# Patient Record
Sex: MALE | Race: WHITE | Employment: OTHER | URBAN - METROPOLITAN AREA
[De-identification: names, ages, dates, MRNs, and addresses within clinical notes are randomized per-mention and may not be internally consistent; named-entity substitution may affect disease eponyms.]

---

## 2017-04-21 ENCOUNTER — HOSPITAL ENCOUNTER (OUTPATIENT)
Dept: CT IMAGING | Age: 57
Discharge: HOME | End: 2017-04-21
Attending: INTERNAL MEDICINE
Payer: MEDICARE

## 2017-04-21 DIAGNOSIS — K74.69 OTHER CIRRHOSIS OF LIVER (HCC): ICD-10-CM

## 2017-04-21 PROCEDURE — 74011000258 HC RX REV CODE- 258: Performed by: INTERNAL MEDICINE

## 2017-04-21 PROCEDURE — 74160 CT ABDOMEN W/CONTRAST: CPT

## 2017-04-21 PROCEDURE — 74011636320 HC RX REV CODE- 636/320: Performed by: INTERNAL MEDICINE

## 2017-04-21 RX ORDER — SODIUM CHLORIDE 0.9 % (FLUSH) 0.9 %
10 SYRINGE (ML) INJECTION
Status: COMPLETED | OUTPATIENT
Start: 2017-04-21 | End: 2017-04-21

## 2017-04-21 RX ADMIN — IOPAMIDOL 100 ML: 755 INJECTION, SOLUTION INTRAVENOUS at 15:47

## 2017-04-21 RX ADMIN — SODIUM CHLORIDE 100 ML: 900 INJECTION, SOLUTION INTRAVENOUS at 15:47

## 2017-04-21 RX ADMIN — DIATRIZOATE MEGLUMINE AND DIATRIZOATE SODIUM 15 ML: 660; 100 LIQUID ORAL; RECTAL at 15:47

## 2017-04-21 RX ADMIN — Medication 10 ML: at 15:47

## 2017-05-08 ENCOUNTER — HOSPITAL ENCOUNTER (OUTPATIENT)
Dept: CT IMAGING | Age: 57
Discharge: HOME | End: 2017-05-08
Attending: INTERNAL MEDICINE
Payer: MEDICARE

## 2017-05-08 DIAGNOSIS — R91.1 SOLITARY PULMONARY NODULE: ICD-10-CM

## 2017-05-08 PROCEDURE — 71250 CT THORAX DX C-: CPT

## 2017-06-02 ENCOUNTER — HOSPITAL ENCOUNTER (OUTPATIENT)
Dept: LAB | Age: 57
Discharge: HOME | End: 2017-06-02
Payer: MEDICARE

## 2017-06-02 LAB
ALBUMIN SERPL BCP-MCNC: 2.6 G/DL (ref 3.5–5)
ALBUMIN/GLOB SERPL: 0.5 {RATIO} (ref 1.2–3.5)
ALP SERPL-CCNC: 152 U/L (ref 50–136)
ALT SERPL-CCNC: 21 U/L (ref 12–65)
ANION GAP BLD CALC-SCNC: 8 MMOL/L (ref 7–16)
AST SERPL W P-5'-P-CCNC: 19 U/L (ref 15–37)
BASOPHILS # BLD AUTO: 0.1 K/UL (ref 0–0.2)
BASOPHILS # BLD: 1 % (ref 0–2)
BILIRUB SERPL-MCNC: 0.4 MG/DL (ref 0.2–1.1)
BUN SERPL-MCNC: 7 MG/DL (ref 6–23)
CALCIUM SERPL-MCNC: 9.5 MG/DL (ref 8.3–10.4)
CHLORIDE SERPL-SCNC: 100 MMOL/L (ref 98–107)
CO2 SERPL-SCNC: 31 MMOL/L (ref 21–32)
CREAT SERPL-MCNC: 1.01 MG/DL (ref 0.8–1.5)
DIFFERENTIAL METHOD BLD: ABNORMAL
EOSINOPHIL # BLD: 0.1 K/UL (ref 0–0.8)
EOSINOPHIL NFR BLD: 1 % (ref 0.5–7.8)
ERYTHROCYTE [DISTWIDTH] IN BLOOD BY AUTOMATED COUNT: 15.4 % (ref 11.9–14.6)
GLOBULIN SER CALC-MCNC: 5 G/DL (ref 2.3–3.5)
GLUCOSE SERPL-MCNC: 83 MG/DL (ref 65–100)
HCT VFR BLD AUTO: 34.1 % (ref 41.1–50.3)
HGB BLD-MCNC: 11 G/DL (ref 13.6–17.2)
IMM GRANULOCYTES # BLD: 0 K/UL (ref 0–0.5)
IMM GRANULOCYTES NFR BLD AUTO: 0.3 % (ref 0–5)
LYMPHOCYTES # BLD AUTO: 26 % (ref 13–44)
LYMPHOCYTES # BLD: 3.1 K/UL (ref 0.5–4.6)
MCH RBC QN AUTO: 26.2 PG (ref 26.1–32.9)
MCHC RBC AUTO-ENTMCNC: 32.3 G/DL (ref 31.4–35)
MCV RBC AUTO: 81.2 FL (ref 79.6–97.8)
MONOCYTES # BLD: 0.8 K/UL (ref 0.1–1.3)
MONOCYTES NFR BLD AUTO: 7 % (ref 4–12)
NEUTS SEG # BLD: 7.7 K/UL (ref 1.7–8.2)
NEUTS SEG NFR BLD AUTO: 65 % (ref 43–78)
PLATELET # BLD AUTO: 482 K/UL (ref 150–450)
PMV BLD AUTO: 8.5 FL (ref 10.8–14.1)
POTASSIUM SERPL-SCNC: 4.5 MMOL/L (ref 3.5–5.1)
PROT SERPL-MCNC: 7.6 G/DL (ref 6.3–8.2)
RBC # BLD AUTO: 4.2 M/UL (ref 4.23–5.67)
SODIUM SERPL-SCNC: 139 MMOL/L (ref 136–145)
WBC # BLD AUTO: 11.7 K/UL (ref 4.3–11.1)

## 2017-06-02 PROCEDURE — 85025 COMPLETE CBC W/AUTO DIFF WBC: CPT | Performed by: NURSE PRACTITIONER

## 2017-06-02 PROCEDURE — 36415 COLL VENOUS BLD VENIPUNCTURE: CPT | Performed by: NURSE PRACTITIONER

## 2017-06-02 PROCEDURE — 80053 COMPREHEN METABOLIC PANEL: CPT | Performed by: NURSE PRACTITIONER

## 2017-06-09 ENCOUNTER — HOSPITAL ENCOUNTER (OUTPATIENT)
Dept: LAB | Age: 57
Discharge: HOME | End: 2017-06-09
Payer: MEDICARE

## 2017-06-09 DIAGNOSIS — D72.829 LEUKOCYTOSIS, UNSPECIFIED TYPE: ICD-10-CM

## 2017-06-09 DIAGNOSIS — K74.60 HEPATIC CIRRHOSIS, UNSPECIFIED HEPATIC CIRRHOSIS TYPE (HCC): ICD-10-CM

## 2017-06-09 LAB
ALBUMIN SERPL BCP-MCNC: 2.3 G/DL (ref 3.5–5)
ALBUMIN/GLOB SERPL: 0.5 {RATIO} (ref 1.2–3.5)
ALP SERPL-CCNC: 246 U/L (ref 50–136)
ALT SERPL-CCNC: 29 U/L (ref 12–65)
AST SERPL W P-5'-P-CCNC: 32 U/L (ref 15–37)
BILIRUB DIRECT SERPL-MCNC: 0.2 MG/DL
BILIRUB SERPL-MCNC: 0.5 MG/DL (ref 0.2–1.1)
GLOBULIN SER CALC-MCNC: 5 G/DL (ref 2.3–3.5)
PROT SERPL-MCNC: 7.3 G/DL (ref 6.3–8.2)

## 2017-06-09 PROCEDURE — 80076 HEPATIC FUNCTION PANEL: CPT | Performed by: INTERNAL MEDICINE

## 2017-06-09 PROCEDURE — 36415 COLL VENOUS BLD VENIPUNCTURE: CPT | Performed by: INTERNAL MEDICINE

## 2017-06-09 PROCEDURE — 87040 BLOOD CULTURE FOR BACTERIA: CPT | Performed by: INTERNAL MEDICINE

## 2017-06-14 LAB
BACTERIA SPEC CULT: NORMAL
BACTERIA SPEC CULT: NORMAL
SERVICE CMNT-IMP: NORMAL
SERVICE CMNT-IMP: NORMAL

## 2017-06-15 ENCOUNTER — HOSPITAL ENCOUNTER (INPATIENT)
Age: 57
LOS: 14 days | Discharge: HOME HEALTH | DRG: 987 | End: 2017-06-29
Attending: INTERNAL MEDICINE | Admitting: INTERNAL MEDICINE
Payer: MEDICARE

## 2017-06-15 ENCOUNTER — APPOINTMENT (OUTPATIENT)
Dept: CT IMAGING | Age: 57
DRG: 987 | End: 2017-06-15
Attending: PHYSICIAN ASSISTANT
Payer: MEDICARE

## 2017-06-15 ENCOUNTER — APPOINTMENT (OUTPATIENT)
Dept: ULTRASOUND IMAGING | Age: 57
DRG: 987 | End: 2017-06-15
Attending: PHYSICIAN ASSISTANT
Payer: MEDICARE

## 2017-06-15 DIAGNOSIS — R09.02 HYPOXIA: ICD-10-CM

## 2017-06-15 DIAGNOSIS — K22.2 SCHATZKI'S RING: ICD-10-CM

## 2017-06-15 DIAGNOSIS — Z86.19 HISTORY OF HEPATITIS C: Chronic | ICD-10-CM

## 2017-06-15 DIAGNOSIS — F41.9 ANXIETY AND DEPRESSION: Chronic | ICD-10-CM

## 2017-06-15 DIAGNOSIS — F32.A ANXIETY AND DEPRESSION: Chronic | ICD-10-CM

## 2017-06-15 DIAGNOSIS — J98.4 PULMONARY CAVITARY LESION: ICD-10-CM

## 2017-06-15 DIAGNOSIS — M31.0 GOODPASTURE SYNDROME (HCC): ICD-10-CM

## 2017-06-15 DIAGNOSIS — R79.82 ELEVATED C-REACTIVE PROTEIN (CRP): ICD-10-CM

## 2017-06-15 DIAGNOSIS — R93.89 ABNORMAL CXR: ICD-10-CM

## 2017-06-15 DIAGNOSIS — E43 SEVERE PROTEIN-CALORIE MALNUTRITION (HCC): Chronic | ICD-10-CM

## 2017-06-15 DIAGNOSIS — R00.1 BRADYCARDIA: ICD-10-CM

## 2017-06-15 LAB
ALBUMIN SERPL BCP-MCNC: 2.3 G/DL (ref 3.5–5)
ALBUMIN/GLOB SERPL: 0.5 {RATIO} (ref 1.2–3.5)
ALP SERPL-CCNC: 172 U/L (ref 50–136)
ALT SERPL-CCNC: 22 U/L (ref 12–65)
ANION GAP BLD CALC-SCNC: 9 MMOL/L (ref 7–16)
APPEARANCE UR: NORMAL
AST SERPL W P-5'-P-CCNC: 22 U/L (ref 15–37)
ATRIAL RATE: 89 BPM
BASOPHILS # BLD AUTO: 0.1 K/UL (ref 0–0.2)
BASOPHILS # BLD: 1 % (ref 0–2)
BILIRUB SERPL-MCNC: 0.2 MG/DL (ref 0.2–1.1)
BILIRUB UR QL: NEGATIVE
BUN SERPL-MCNC: 6 MG/DL (ref 6–23)
CALCIUM SERPL-MCNC: 9.3 MG/DL (ref 8.3–10.4)
CALCULATED P AXIS, ECG09: 55 DEGREES
CALCULATED R AXIS, ECG10: 42 DEGREES
CALCULATED T AXIS, ECG11: 61 DEGREES
CHLORIDE SERPL-SCNC: 104 MMOL/L (ref 98–107)
CO2 SERPL-SCNC: 30 MMOL/L (ref 21–32)
COLOR UR: YELLOW
CREAT SERPL-MCNC: 1 MG/DL (ref 0.8–1.5)
DIAGNOSIS, 93000: NORMAL
DIFFERENTIAL METHOD BLD: ABNORMAL
EOSINOPHIL # BLD: 0.2 K/UL (ref 0–0.8)
EOSINOPHIL NFR BLD: 3 % (ref 0.5–7.8)
ERYTHROCYTE [DISTWIDTH] IN BLOOD BY AUTOMATED COUNT: 16.4 % (ref 11.9–14.6)
GLOBULIN SER CALC-MCNC: 4.6 G/DL (ref 2.3–3.5)
GLUCOSE SERPL-MCNC: 93 MG/DL (ref 65–100)
GLUCOSE UR STRIP.AUTO-MCNC: NEGATIVE MG/DL
HCT VFR BLD AUTO: 33.8 % (ref 41.1–50.3)
HGB BLD-MCNC: 10.9 G/DL (ref 13.6–17.2)
HGB UR QL STRIP: NEGATIVE
IMM GRANULOCYTES # BLD: 0.1 K/UL (ref 0–0.5)
IMM GRANULOCYTES NFR BLD AUTO: 0.8 % (ref 0–5)
KETONES UR QL STRIP.AUTO: NEGATIVE MG/DL
LEUKOCYTE ESTERASE UR QL STRIP.AUTO: NEGATIVE
LYMPHOCYTES # BLD AUTO: 28 % (ref 13–44)
LYMPHOCYTES # BLD: 2.2 K/UL (ref 0.5–4.6)
MCH RBC QN AUTO: 25.8 PG (ref 26.1–32.9)
MCHC RBC AUTO-ENTMCNC: 32.2 G/DL (ref 31.4–35)
MCV RBC AUTO: 79.9 FL (ref 79.6–97.8)
MONOCYTES # BLD: 0.3 K/UL (ref 0.1–1.3)
MONOCYTES NFR BLD AUTO: 4 % (ref 4–12)
NEUTS SEG # BLD: 5.1 K/UL (ref 1.7–8.2)
NEUTS SEG NFR BLD AUTO: 63 % (ref 43–78)
NITRITE UR QL STRIP.AUTO: NEGATIVE
P-R INTERVAL, ECG05: 140 MS
PH UR STRIP: 7.5 [PH] (ref 5–9)
PLATELET # BLD AUTO: 463 K/UL (ref 150–450)
PMV BLD AUTO: 8.6 FL (ref 10.8–14.1)
POTASSIUM SERPL-SCNC: 3.9 MMOL/L (ref 3.5–5.1)
PROCALCITONIN SERPL-MCNC: <0.1 NG/ML
PROT SERPL-MCNC: 6.9 G/DL (ref 6.3–8.2)
PROT UR STRIP-MCNC: NEGATIVE MG/DL
Q-T INTERVAL, ECG07: 364 MS
QRS DURATION, ECG06: 98 MS
QTC CALCULATION (BEZET), ECG08: 442 MS
RBC # BLD AUTO: 4.23 M/UL (ref 4.23–5.67)
SODIUM SERPL-SCNC: 143 MMOL/L (ref 136–145)
SP GR UR REFRACTOMETRY: 1.01 (ref 1–1.02)
UROBILINOGEN UR QL STRIP.AUTO: 1 EU/DL (ref 0.2–1)
VENTRICULAR RATE, ECG03: 89 BPM
WBC # BLD AUTO: 8 K/UL (ref 4.3–11.1)

## 2017-06-15 PROCEDURE — 85025 COMPLETE CBC W/AUTO DIFF WBC: CPT | Performed by: PHYSICIAN ASSISTANT

## 2017-06-15 PROCEDURE — 74011250637 HC RX REV CODE- 250/637: Performed by: PHYSICIAN ASSISTANT

## 2017-06-15 PROCEDURE — 83516 IMMUNOASSAY NONANTIBODY: CPT | Performed by: INTERNAL MEDICINE

## 2017-06-15 PROCEDURE — 99223 1ST HOSP IP/OBS HIGH 75: CPT | Performed by: INTERNAL MEDICINE

## 2017-06-15 PROCEDURE — 36415 COLL VENOUS BLD VENIPUNCTURE: CPT | Performed by: PHYSICIAN ASSISTANT

## 2017-06-15 PROCEDURE — 84145 PROCALCITONIN (PCT): CPT | Performed by: PHYSICIAN ASSISTANT

## 2017-06-15 PROCEDURE — 81003 URINALYSIS AUTO W/O SCOPE: CPT | Performed by: PHYSICIAN ASSISTANT

## 2017-06-15 PROCEDURE — 74011000258 HC RX REV CODE- 258: Performed by: PHYSICIAN ASSISTANT

## 2017-06-15 PROCEDURE — 74011250636 HC RX REV CODE- 250/636: Performed by: INTERNAL MEDICINE

## 2017-06-15 PROCEDURE — 93005 ELECTROCARDIOGRAM TRACING: CPT | Performed by: PHYSICIAN ASSISTANT

## 2017-06-15 PROCEDURE — 71260 CT THORAX DX C+: CPT

## 2017-06-15 PROCEDURE — 74011636320 HC RX REV CODE- 636/320: Performed by: INTERNAL MEDICINE

## 2017-06-15 PROCEDURE — 93971 EXTREMITY STUDY: CPT

## 2017-06-15 PROCEDURE — 87040 BLOOD CULTURE FOR BACTERIA: CPT | Performed by: INTERNAL MEDICINE

## 2017-06-15 PROCEDURE — 74011250636 HC RX REV CODE- 250/636: Performed by: PHYSICIAN ASSISTANT

## 2017-06-15 PROCEDURE — 80053 COMPREHEN METABOLIC PANEL: CPT | Performed by: PHYSICIAN ASSISTANT

## 2017-06-15 PROCEDURE — 65270000029 HC RM PRIVATE

## 2017-06-15 PROCEDURE — 83520 IMMUNOASSAY QUANT NOS NONAB: CPT | Performed by: PHYSICIAN ASSISTANT

## 2017-06-15 PROCEDURE — 74011000258 HC RX REV CODE- 258: Performed by: INTERNAL MEDICINE

## 2017-06-15 RX ORDER — SODIUM CHLORIDE 0.9 % (FLUSH) 0.9 %
5-10 SYRINGE (ML) INJECTION EVERY 8 HOURS
Status: DISCONTINUED | OUTPATIENT
Start: 2017-06-15 | End: 2017-06-29 | Stop reason: HOSPADM

## 2017-06-15 RX ORDER — MIRTAZAPINE 15 MG/1
30 TABLET, FILM COATED ORAL
Status: DISCONTINUED | OUTPATIENT
Start: 2017-06-15 | End: 2017-06-29 | Stop reason: HOSPADM

## 2017-06-15 RX ORDER — ALBUTEROL SULFATE 0.83 MG/ML
2.5 SOLUTION RESPIRATORY (INHALATION)
Status: DISCONTINUED | OUTPATIENT
Start: 2017-06-15 | End: 2017-06-29 | Stop reason: HOSPADM

## 2017-06-15 RX ORDER — DESVENLAFAXINE 100 MG/1
100 TABLET, EXTENDED RELEASE ORAL 2 TIMES DAILY
Status: DISCONTINUED | OUTPATIENT
Start: 2017-06-15 | End: 2017-06-29 | Stop reason: HOSPADM

## 2017-06-15 RX ORDER — PANTOPRAZOLE SODIUM 40 MG/1
40 TABLET, DELAYED RELEASE ORAL
Status: DISCONTINUED | OUTPATIENT
Start: 2017-06-16 | End: 2017-06-29 | Stop reason: HOSPADM

## 2017-06-15 RX ORDER — GABAPENTIN 400 MG/1
1200 CAPSULE ORAL 3 TIMES DAILY
Status: DISCONTINUED | OUTPATIENT
Start: 2017-06-15 | End: 2017-06-29 | Stop reason: HOSPADM

## 2017-06-15 RX ORDER — AMOXICILLIN 250 MG
1 CAPSULE ORAL
Status: DISCONTINUED | OUTPATIENT
Start: 2017-06-15 | End: 2017-06-29 | Stop reason: HOSPADM

## 2017-06-15 RX ORDER — MORPHINE SULFATE 2 MG/ML
1 INJECTION, SOLUTION INTRAMUSCULAR; INTRAVENOUS
Status: DISCONTINUED | OUTPATIENT
Start: 2017-06-15 | End: 2017-06-19

## 2017-06-15 RX ORDER — LAMOTRIGINE 100 MG/1
100 TABLET ORAL DAILY
Status: DISCONTINUED | OUTPATIENT
Start: 2017-06-16 | End: 2017-06-29 | Stop reason: HOSPADM

## 2017-06-15 RX ORDER — SODIUM CHLORIDE 0.9 % (FLUSH) 0.9 %
10 SYRINGE (ML) INJECTION
Status: COMPLETED | OUTPATIENT
Start: 2017-06-15 | End: 2017-06-15

## 2017-06-15 RX ORDER — NALOXONE HYDROCHLORIDE 0.4 MG/ML
0.4 INJECTION, SOLUTION INTRAMUSCULAR; INTRAVENOUS; SUBCUTANEOUS AS NEEDED
Status: DISCONTINUED | OUTPATIENT
Start: 2017-06-15 | End: 2017-06-29 | Stop reason: HOSPADM

## 2017-06-15 RX ORDER — DIAZEPAM 5 MG/1
10 TABLET ORAL
Status: DISCONTINUED | OUTPATIENT
Start: 2017-06-15 | End: 2017-06-29 | Stop reason: HOSPADM

## 2017-06-15 RX ORDER — SODIUM CHLORIDE 9 MG/ML
100 INJECTION, SOLUTION INTRAVENOUS CONTINUOUS
Status: DISCONTINUED | OUTPATIENT
Start: 2017-06-15 | End: 2017-06-22

## 2017-06-15 RX ORDER — DOXAZOSIN 4 MG/1
4 TABLET ORAL DAILY
Status: DISCONTINUED | OUTPATIENT
Start: 2017-06-16 | End: 2017-06-29 | Stop reason: HOSPADM

## 2017-06-15 RX ORDER — SODIUM CHLORIDE 0.9 % (FLUSH) 0.9 %
5-10 SYRINGE (ML) INJECTION AS NEEDED
Status: DISCONTINUED | OUTPATIENT
Start: 2017-06-15 | End: 2017-06-29 | Stop reason: HOSPADM

## 2017-06-15 RX ORDER — ACETAMINOPHEN 325 MG/1
650 TABLET ORAL
Status: DISCONTINUED | OUTPATIENT
Start: 2017-06-15 | End: 2017-06-29 | Stop reason: HOSPADM

## 2017-06-15 RX ORDER — BACLOFEN 10 MG/1
20 TABLET ORAL 3 TIMES DAILY
Status: DISCONTINUED | OUTPATIENT
Start: 2017-06-15 | End: 2017-06-29 | Stop reason: HOSPADM

## 2017-06-15 RX ORDER — ONDANSETRON 2 MG/ML
4 INJECTION INTRAMUSCULAR; INTRAVENOUS
Status: DISCONTINUED | OUTPATIENT
Start: 2017-06-15 | End: 2017-06-29 | Stop reason: HOSPADM

## 2017-06-15 RX ORDER — OXYCODONE HYDROCHLORIDE 15 MG/1
30 TABLET ORAL
Status: DISCONTINUED | OUTPATIENT
Start: 2017-06-15 | End: 2017-06-29 | Stop reason: HOSPADM

## 2017-06-15 RX ORDER — LORAZEPAM 2 MG/ML
1 INJECTION INTRAMUSCULAR
Status: DISCONTINUED | OUTPATIENT
Start: 2017-06-15 | End: 2017-06-19

## 2017-06-15 RX ORDER — DEXTROAMPHETAMINE SACCHARATE, AMPHETAMINE ASPARTATE, DEXTROAMPHETAMINE SULFATE AND AMPHETAMINE SULFATE 3.75; 3.75; 3.75; 3.75 MG/1; MG/1; MG/1; MG/1
30 TABLET ORAL 3 TIMES DAILY
Status: DISCONTINUED | OUTPATIENT
Start: 2017-06-15 | End: 2017-06-29 | Stop reason: HOSPADM

## 2017-06-15 RX ORDER — ENOXAPARIN SODIUM 100 MG/ML
40 INJECTION SUBCUTANEOUS EVERY 24 HOURS
Status: DISCONTINUED | OUTPATIENT
Start: 2017-06-15 | End: 2017-06-19

## 2017-06-15 RX ADMIN — DIAZEPAM 10 MG: 5 TABLET ORAL at 15:03

## 2017-06-15 RX ADMIN — BACLOFEN 20 MG: 10 TABLET ORAL at 17:08

## 2017-06-15 RX ADMIN — Medication 5 ML: at 17:10

## 2017-06-15 RX ADMIN — CEFTRIAXONE 1 G: 1 INJECTION, POWDER, FOR SOLUTION INTRAMUSCULAR; INTRAVENOUS at 17:07

## 2017-06-15 RX ADMIN — DEXTROAMPHETAMINE SACCHARATE, AMPHETAMINE ASPARTATE, DEXTROAMPHETAMINE SULFATE AND AMPHETAMINE SULFATE 30 MG: 3.75; 3.75; 3.75; 3.75 TABLET ORAL at 21:08

## 2017-06-15 RX ADMIN — Medication 10 ML: at 16:22

## 2017-06-15 RX ADMIN — MIRTAZAPINE 30 MG: 15 TABLET, FILM COATED ORAL at 21:08

## 2017-06-15 RX ADMIN — OXYCODONE HYDROCHLORIDE 30 MG: 15 TABLET ORAL at 21:08

## 2017-06-15 RX ADMIN — IOPAMIDOL 100 ML: 755 INJECTION, SOLUTION INTRAVENOUS at 16:22

## 2017-06-15 RX ADMIN — Medication 10 ML: at 21:13

## 2017-06-15 RX ADMIN — DESVENLAFAXINE 100 MG: 100 TABLET, EXTENDED RELEASE ORAL at 21:10

## 2017-06-15 RX ADMIN — OXYCODONE HYDROCHLORIDE 30 MG: 15 TABLET ORAL at 14:59

## 2017-06-15 RX ADMIN — SODIUM CHLORIDE 100 ML/HR: 900 INJECTION, SOLUTION INTRAVENOUS at 17:06

## 2017-06-15 RX ADMIN — BACLOFEN 20 MG: 10 TABLET ORAL at 21:08

## 2017-06-15 RX ADMIN — ENOXAPARIN SODIUM 40 MG: 40 INJECTION SUBCUTANEOUS at 17:09

## 2017-06-15 RX ADMIN — SODIUM CHLORIDE 1000 ML: 900 INJECTION, SOLUTION INTRAVENOUS at 19:30

## 2017-06-15 RX ADMIN — DEXTROAMPHETAMINE SACCHARATE, AMPHETAMINE ASPARTATE, DEXTROAMPHETAMINE SULFATE AND AMPHETAMINE SULFATE 30 MG: 3.75; 3.75; 3.75; 3.75 TABLET ORAL at 17:08

## 2017-06-15 RX ADMIN — SODIUM CHLORIDE 100 ML: 900 INJECTION, SOLUTION INTRAVENOUS at 16:22

## 2017-06-15 RX ADMIN — AZITHROMYCIN MONOHYDRATE 500 MG: 500 INJECTION, POWDER, LYOPHILIZED, FOR SOLUTION INTRAVENOUS at 17:06

## 2017-06-15 RX ADMIN — GABAPENTIN 1200 MG: 400 CAPSULE ORAL at 17:09

## 2017-06-15 NOTE — PROGRESS NOTES
Pt blood pressure low called Dr. Hannah Garner order for 1 liter bolus received. will continue to monitor pt.

## 2017-06-15 NOTE — PROGRESS NOTES
Patient arrived to room 803 via w/c via family. Patient is alert and orientated with no distress noted. Respirations even and unlabored with heart rate regular. Patient able to ambulate x1 assist r/t weakness and LLE pain (possible DVT). Duel skin assessment completed with Pat RN. Patient has several scars across whole body. Bilateral feet with thick flaky skin peeling. Left pinky toe red with the next toe with healing scab on top. Patient states peeling skin are blisters that have popped. Sister at bedside for support. Patient orientated to room and surroundings. Bed in low locked position with call light within reach. Patient instructed to call when assistance is needed. Will continue to monitor.   Waiting on orders from receiving MD.

## 2017-06-15 NOTE — PROGRESS NOTES
Patient stable with no complaints at this time. Patient relaxing in bed watching TV. Patient with sister at bedside. Sister to bring home med back this PM.  Call light within reach.   Report to be given to oncoming RN 7p-7a

## 2017-06-15 NOTE — IP AVS SNAPSHOT
[address] [facility] 
[phone] Patient: Marylou Cormier MRN: [id number] :1960 You are allergic to the following No active allergies Recent Documentation Height Weight BMI Smoking Status 1.702 m 71.8 kg 24.78 kg/m2 Former Smoker Unresulted Labs Order Current Status AFB CULTURE + SMEAR W/RFLX ID FROM CULTURE Preliminary result FUNGUS IDENTIFICATION REFLEX Preliminary result FUNGUS IDENTIFICATION REFLEX Preliminary result Emergency Contacts Name Discharge Info Relation Home Work Mobile Bety Mar DISCHARGE CAREGIVER [3] Sister [23] [phone] Mello Keen DISCHARGE CAREGIVER [3] Son [22] [phone] About your hospitalization You were admitted on:  Radha 15, 2017 You last received care in the:  [facility] You were discharged on:  2017 Unit phone number:  [phone] Why you were hospitalized Your primary diagnosis was:  Pulmonary Cavitary Lesion Your diagnoses also included:  Anxiety And Depression, Hld (Hyperlipidemia), History Of Hepatitis C, Chronic Shoulder Pain, Bph (Benign Prostatic Hyperplasia), Abnormal Cxr, Elevated C-Reactive Protein (Crp), Goodpasture Syndrome (Hcc), Bradycardia, Severe Protein-Calorie Malnutrition (Hcc), Schatzki's Ring, Edema, Hypoxia Providers Seen During Your Hospitalizations Provider Role Specialty Primary office phone Bere Taylor MD Attending Provider Pulmonary Disease [phone] Boni Scott MD Attending Provider Oncology [phone] Your Primary Care Physician (PCP) Primary Care Physician Office Phone Office Fax Afia Garcia [phone] Follow-up Information Follow up With Details Comments Contact Info  [facility] Cardiology  arrange 14 day tele monitor and follow up after monitor [address]  
 [address] [address] [facility] 
[phone] Ke Hameed MD   [address] [place] [address] 
[phone] Josselin Dowd MD On 6/30/2017 at 12:30 [address] [place] [address] 
[phone] Blake Galarza NP On 7/14/2017 9:15 a.m. [facility] office per [facility]. .. [address] [place] [address] 
[phone] Kacy Dunaway MD On 7/18/2017 pt needs to be at office at 3:10 p.m. per [facility] [address] [place] [facility] [place] [address] 
[phone] Your Appointments Friday June 30, 2017 12:30 PM EDT  
LAB with [address]  
18[address] [facility] OUTREACH INSURANCE [address]  
[phone] Friday June 30, 2017  1:00 PM EDT New Patient with Josselin Dowd MD  
[facility]) [address] [place] [address]  
[phone] Friday June 30, 2017  2:00 PM EDT Apheresis with FLR5 INF2 [facility] ([address] [place] 5th Floor Infusion [address] Mitchell Skiff [phone]  [place] [address]  
[phone] For [facility] SURGICAL [facility] Floor [phone] ext. 3201 [facility] Sunday July 02, 2017  2:00 PM EDT Apheresis with FLR5 INF3 [facility] ([address] [facility] [place] 5th Floor Infusion [address] Mitchell Skiff [phone]  [place] [address]  
[phone] For [facility] Floor [phone] ext. 3201 [facility] Tuesday July 18, 2017  3:40 PM EDT  
(Arrive by 3:10 PM) HOSPITAL with Kacy Dunaway MD  
[facility] SPECIALTY [facility]monary and Critical Care ([facility]) [address] [facility] [address] [facility]  
[phone] Current Discharge Medication List  
  
START taking these medications Dose & Instructions Dispensing Information Comments Morning Noon Evening Bedtime cyclophosphamide 50 mg capsule Commonly known as:  CYTOXAN Your last dose was:  Yesterday 06/28/17 at 21  Your next dose is: Take tonight at 10 pm 
  
   
 Dose:  150 mg Take 3 Caps by mouth every twenty-four (24) hours for 14 days. Quantity:  42 Cap Refills:  0  
     
   
   
   
  
  
 polyethylene glycol 17 gram packet Commonly known as:  Margarita Guevara Your next dose is:  Tomorrow Morning Dose:  17 g Take 1 Packet by mouth daily. Quantity:  30 Packet Refills:  1  
     
  
   
   
   
  
 senna-docusate 8.6-50 mg per tablet Commonly known as:  Lysle Gaudy Your next dose is: Take on as needed schedule Dose:  1 Tab Take 1 Tab by mouth two (2) times daily as needed for Constipation. Quantity:  60 Tab Refills:  1 CONTINUE these medications which have CHANGED Dose & Instructions Dispensing Information Comments Morning Noon Evening Bedtime  
 oxyCODONE IR 30 mg immediate release tablet Commonly known as:  OXY-IR Start taking on:  7/8/2017 What changed:  Another medication with the same name was removed. Continue taking this medication, and follow the directions you see here. Dose:  30 mg Take 1 Tab by mouth every four (4) hours as needed for Pain. Quantity:  180 Tab Refills:  0 CONTINUE these medications which have NOT CHANGED Dose & Instructions Dispensing Information Comments Morning Noon Evening Bedtime ADDERALL 30 mg tablet Generic drug:  dextroamphetamine-amphetamine Your next dose is:  Resume home schedule Dose:  30 mg Take 30 mg by mouth three (3) times daily. Refills:  0  
     
   
   
   
  
 baclofen 20 mg tablet Commonly known as:  LIORESAL Your next dose is:  Resume home schedule Dose:  20 mg Take 1 Tab by mouth three (3) times daily. Quantity:  270 Tab Refills:  3  
     
   
   
   
  
 doxazosin 4 mg tablet Commonly known as:  CARDURA Your next dose is:  Resume home schedule Dose:  4 mg Take 1 Tab by mouth daily. Indications: SYMPTOMATIC BENIGN PROSTATIC HYPERPLASIA Quantity:  30 Tab Refills:  6  
     
   
   
   
  
 gabapentin 600 mg tablet Commonly known as:  NEURONTIN Your next dose is:  Resume home schedule Dose:  1200 mg Take 2 Tabs by mouth three (3) times daily. Quantity:  180 Tab Refills:  11 To replace previous order for 400 mg capsules  
    
   
   
   
  
 lamoTRIgine 100 mg tablet Commonly known as: LaMICtal  
Your next dose is:  Resume home schedule TK 1 T PO Q DAY Refills:  2  
     
   
   
   
  
 multivitamin, stress formula tablet Commonly known as:  STRESS TAB Your next dose is:  Tomorrow Morning Dose:  1 Tab Take 1 Tab by mouth daily. Refills:  0  
     
   
   
   
  
 omeprazole 40 mg capsule Commonly known as:  PRILOSEC Your next dose is:  Tomorrow Morning Dose:  40 mg Take 1 Cap by mouth daily. Quantity:  30 Cap Refills:  11 PRISTIQ PO Your next dose is:  Tomorrow Morning Dose:  100 mg Take 100 mg by mouth daily. Refills:  0 REMERON 30 mg tablet Generic drug:  mirtazapine Your next dose is:  Resume home schedule 2 tabs po qhs Refills:  0 VALIUM 10 mg tablet Generic drug:  diazePAM  
Your next dose is: Take on as needed schedule Dose:  10 mg Take 10 mg by mouth every four (4) hours as needed. Refills:  0 STOP taking these medications   
 levoFLOXacin 750 mg tablet Commonly known as:  Gerardo Newman Where to Get Your Medications These medications were sent to [address] [address] Phone:  [phone] cyclophosphamide 50 mg capsule  
 polyethylene glycol 17 gram packet  
 senna-docusate 8.6-50 mg per tablet Discharge Instructions Chronic Pain: Care Instructions Your Care Instructions Chronic pain is pain that lasts a long time (months or even years) and may or may not have a clear cause. It is different from acute pain, which usually does have a clear causelike an injury or illnessand gets better over time. Chronic pain: 
· Lasts over time but may vary from day to day. · Does not go away despite efforts to end it. · May disrupt your sleep and lead to fatigue. · May cause depression or anxiety. · May make your muscles tense, causing more pain. · Can disrupt your work, hobbies, home life, and relationships with friends and family. Chronic pain is a very real condition. It is not just in your head. Treatment can help and usually includes several methods used together, such as medicines, physical therapy, exercise, and other treatments. Learning how to relax and changing negative thought patterns can also help you cope. Chronic pain is complex. Taking an active role in your treatment will help you better manage your pain. Tell your doctor if you have trouble dealing with your pain. You may have to try several things before you find what works best for you. Follow-up care is a key part of your treatment and safety. Be sure to make and go to all appointments, and call your doctor if you are having problems. Its also a good idea to know your test results and keep a list of the medicines you take. How can you care for yourself at home? · Pace yourself. Break up large jobs into smaller tasks. Save harder tasks for days when you have less pain, or go back and forth between hard tasks and easier ones. Take rest breaks. · Relax, and reduce stress. Relaxation techniques such as deep breathing or meditation can help. · Keep moving. Gentle, daily exercise can help reduce pain over the long run. Try low- or no-impact exercises such as walking, swimming, and stationary biking. Do stretches to stay flexible. · Try heat, cold packs, and massage. · Get enough sleep. Chronic pain can make you tired and drain your energy. Talk with your doctor if you have trouble sleeping because of pain. · Think positive. Your thoughts can affect your pain level. Do things that you enjoy to distract yourself when you have pain instead of focusing on the pain. See a movie, read a book, listen to music, or spend time with a friend. · If you think you are depressed, talk to your doctor about treatment. · Keep a daily pain diary. Record how your moods, thoughts, sleep patterns, activities, and medicine affect your pain. You may find that your pain is worse during or after certain activities or when you are feeling a certain emotion. Having a record of your pain can help you and your doctor find the best ways to treat your pain. · Take pain medicines exactly as directed. ¨ If the doctor gave you a prescription medicine for pain, take it as prescribed. ¨ If you are not taking a prescription pain medicine, ask your doctor if you can take an over-the-counter medicine. Reducing constipation caused by pain medicine · Include fruits, vegetables, beans, and whole grains in your diet each day. These foods are high in fiber. · Drink plenty of fluids, enough so that your urine is light yellow or clear like water. If you have kidney, heart, or liver disease and have to limit fluids, talk with your doctor before you increase the amount of fluids you drink. · If your doctor recommends it, get more exercise. Walking is a good choice. Bit by bit, increase the amount you walk every day. Try for at least 30 minutes on most days of the week. · Schedule time each day for a bowel movement. A daily routine may help. Take your time and do not strain when having a bowel movement. When should you call for help? Call your doctor now or seek immediate medical care if: 
· Your pain gets worse or is out of control. · You feel down or blue, or you do not enjoy things like you once did. You may be depressed, which is common in people with chronic pain. Depression can be treated. · You have vomiting or cramps for more than 2 hours. Watch closely for changes in your health, and be sure to contact your doctor if: 
· You cannot sleep because of pain. · You are very worried or anxious about your pain. · You have trouble taking your pain medicine. · You have any concerns about your pain medicine. · You have trouble with bowel movements, such as: 
¨ No bowel movement in 3 days. ¨ Blood in the anal area, in your stool, or on the toilet paper. ¨ Diarrhea for more than 24 hours. Where can you learn more? Go to http://dionne-keyana.info/. Enter N004 in the search box to learn more about \"Chronic Pain: Care Instructions. \" Current as of: October 14, 2016 Content Version: 11.3 © 1770-2161 [employer]. Care instructions adapted under license by [employer] (which disclaims liability or warranty for this information). If you have questions about a medical condition or this instruction, always ask your healthcare professional. [address] any warranty or liability for your use of this information. Leg and Ankle Edema: Care Instructions Your Care Instructions Swelling in the legs, ankles, and feet is called edema. It is common after you sit or stand for a while. Long plane flights or car rides often cause swelling in the legs and feet. You may also have swelling if you have to stand for long periods of time at your job. Problems with the veins in the legs (varicose veins) and changes in hormones can also cause swelling. Sometimes the swelling in the ankles and feet is caused by a more serious problem, such as heart failure, infection, blood clots, or liver or kidney disease. Follow-up care is a key part of your treatment and safety. Be sure to make and go to all appointments, and call your doctor if you are having problems. Its also a good idea to know your test results and keep a list of the medicines you take. How can you care for yourself at home? · If your doctor gave you medicine, take it as prescribed. Call your doctor if you think you are having a problem with your medicine. · Whenever you are resting, raise your legs up. Try to keep the swollen area higher than the level of your heart. · Take breaks from standing or sitting in one position. ¨ Walk around to increase the blood flow in your lower legs. ¨ Move your feet and ankles often while you stand, or tighten and relax your leg muscles. · Wear support stockings. Put them on in the morning, before swelling gets worse. · Eat a balanced diet. Lose weight if you need to. · Limit the amount of salt (sodium) in your diet. Salt holds fluid in the body and may increase swelling. When should you call for help? Call 911 anytime you think you may need emergency care. For example, call if: 
· You have symptoms of a blood clot in your lung (called a pulmonary embolism). These may include: 
¨ Sudden chest pain. ¨ Trouble breathing. ¨ Coughing up blood. Call your doctor now or seek immediate medical care if: 
· You have signs of a blood clot, such as: 
¨ Pain in your calf, back of the knee, thigh, or groin. ¨ Redness and swelling in your leg or groin. · You have symptoms of infection, such as: 
¨ Increased pain, swelling, warmth, or redness. ¨ Red streaks or pus. ¨ A fever. Watch closely for changes in your health, and be sure to contact your doctor if: 
· Your swelling is getting worse. · You have new or worsening pain in your legs. · You do not get better as expected. Where can you learn more? Go to http://dionne-keyana.info/. Enter F566 in the search box to learn more about \"Leg and Ankle Edema: Care Instructions. \" Current as of: March 20, 2017 Content Version: 11.3 © 4595-1043 [employer]. Care instructions adapted under license by [employer] (which disclaims liability or warranty for this information). If you have questions about a medical condition or this instruction, always ask your healthcare professional. [address] any warranty or liability for your use of this information. DISCHARGE SUMMARY from Nurse The following personal items are in your possession at time of discharge: 
 
Dental Appliances: Uppers, Lowers Visual Aid: Glasses Home Medications: None Jewelry: None Clothing: Pants, Shirt, Footwear Other Valuables: None PATIENT INSTRUCTIONS: 
 
After general anesthesia or intravenous sedation, for 24 hours or while taking prescription Narcotics: · Limit your activities · Do not drive and operate hazardous machinery · Do not make important personal or business decisions · Do  not drink alcoholic beverages · If you have not urinated within 8 hours after discharge, please contact your surgeon on call. Report the following to your surgeon: 
· Excessive pain, swelling, redness or odor of or around the surgical area · Temperature over 100.5 · Nausea and vomiting lasting longer than 4 hours or if unable to take medications · Any signs of decreased circulation or nerve impairment to extremity: change in color, persistent  numbness, tingling, coldness or increase pain · Any questions What to do at Home: 
Recommended activity: Activity as tolerated, *  Please give a list of your current medications to your Primary Care Provider.  
 
*  Please update this list whenever your medications are discontinued, doses are 
 changed, or new medications (including over-the-counter products) are added. *  Please carry medication information at all times in case of emergency situations. These are general instructions for a healthy lifestyle: No smoking/ No tobacco products/ Avoid exposure to second hand smoke Surgeon General's Warning:  Quitting smoking now greatly reduces serious risk to your health. Obesity, smoking, and sedentary lifestyle greatly increases your risk for illness A healthy diet, regular physical exercise & weight monitoring are important for maintaining a healthy lifestyle You may be retaining fluid if you have a history of heart failure or if you experience any of the following symptoms:  Weight gain of 3 pounds or more overnight or 5 pounds in a week, increased swelling in our hands or feet or shortness of breath while lying flat in bed. Please call your doctor as soon as you notice any of these symptoms; do not wait until your next office visit. Recognize signs and symptoms of STROKE: 
 
F-face looks uneven A-arms unable to move or move unevenly S-speech slurred or non-existent T-time-call 911 as soon as signs and symptoms begin-DO NOT go Back to bed or wait to see if you get better-TIME IS BRAIN. Warning Signs of HEART ATTACK Call 911 if you have these symptoms: 
? Chest discomfort. Most heart attacks involve discomfort in the center of the chest that lasts more than a few minutes, or that goes away and comes back. It can feel like uncomfortable pressure, squeezing, fullness, or pain. ? Discomfort in other areas of the upper body. Symptoms can include pain or discomfort in one or both arms, the back, neck, jaw, or stomach. ? Shortness of breath with or without chest discomfort. ? Other signs may include breaking out in a cold sweat, nausea, or lightheadedness. Don't wait more than five minutes to call [address]!  Fast action can save your life. Calling 911 is almost always the fastest way to get lifesaving treatment. Emergency Medical Services staff can begin treatment when they arrive  up to an hour sooner than if someone gets to the hospital by car. The discharge information has been reviewed with the patient. The patient verbalized understanding. Discharge medications reviewed with the patient and appropriate educational materials and side effects teaching were provided. Discharge Orders None [facility] Announcement We are excited to announce that we are making your provider's discharge notes available to you in OpenHomes. You will see these notes when they are completed and signed by the physician that discharged you from your recent hospital stay. If you have any questions or concerns about any information you see in OpenHomes, please call the Health Information Department where you were seen or reach out to your Primary Care Provider for more information about your plan of care. Introducing [facility]! Wendi Low introduces OpenHomes patient portal. Now you can access parts of your medical record, email your doctor's office, and request medication refills online. 1. In your internet browser, go to [email or web address] 2. Click on the First Time User? Click Here link in the Sign In box. You will see the New Member Sign Up page. 3. Enter your OpenHomes Access Code exactly as it appears below. You will not need to use this code after youve completed the sign-up process. If you do not sign up before the expiration date, you must request a new code. · OpenHomes Access Code: [email or web address] Expires: 8/31/2017 10:10 AM 
 
4. Enter the last four digits of your Social Security Number (xxxx) and Date of Birth (mm/dd/yyyy) as indicated and click Submit. You will be taken to the next sign-up page. 5. Create a REALTIME.CO ID. This will be your REALTIME.CO login ID and cannot be changed, so think of one that is secure and easy to remember. 6. Create a REALTIME.CO password. You can change your password at any time. 7. Enter your Password Reset Question and Answer. This can be used at a later time if you forget your password. 8. Enter your e-mail address. You will receive e-mail notification when new information is available in [address] 9. Click Sign Up. You can now view and download portions of your medical record. 10. Click the Download Summary menu link to download a portable copy of your medical information. If you have questions, please visit the Frequently Asked Questions section of the [email or web address].CO website. Remember, REALTIME.CO is NOT to be used for urgent needs. For medical emergencies, dial 911. Now available from your iPhone and Android! General Information Please provide this summary of care documentation to your next provider. Patient Signature:  ____________________________________________________________ Date:  ____________________________________________________________  
  
Arna Star Provider Signature:  ____________________________________________________________ Date:  ____________________________________________________________

## 2017-06-15 NOTE — PROGRESS NOTES
Bedside report received from [place], [facility].

## 2017-06-15 NOTE — H&P
HISTORY AND PHYSICAL      Soraida Kyle Adcox    6/15/2017    Date of Admission:  6/15/2017    The patient's chart is reviewed and the patient is discussed with the staff. Subjective:     Patient is a 61 y.o.  male presents with cavitary pulmonary lesion. Pt has a history of depression, anxiety, ADD,Hepatitis C, BPH, and chronic pain. Pt has been feeling poorly for months and has lost 40lbs over the last 3 months secondary to anorexia. He has also had chills, shortness of breath, and edema. He had abd CT 4/21/17 which revealed RLL 4.4 cm x 3.1cm density. He had chest CT 5/8/17 which revealed multiple large infiltrative nodules in R lung concerning for cavitation. He was seen 6/8 by Dr. Zaire Mtz and labs collected were reviewed. Pt was started on LVQ on 6/9. He was seen by Dr. Zaire Mtz this morning and was not any better. He was orthostatic and we have admitted pt for work up of cavitary lesion. Pt reports that he has felt poorly for months. He has not had coughing. He admits to shortness of breath over the last few days with standing or exertion. He admits to lower ext pain/swelling. He has had swelling in his feet with blistering on his toes. He has a history of smoking <1 ppd x 10 years off and on. He quit smoking 2014. He is not on O2 or inhalers at home. His O2 sats did drop to 86% on RA in Dr. Zaire Mtz' office. He denies rashes. Home DME company none.     Review of Systems  Constitutional: positive for chills and fatigue  Eyes: negative  Ears, nose, mouth, throat, and face: negative  Respiratory: positive for dyspnea on exertion  Cardiovascular: positive for fatigue, lower extremity edema, dyspnea on exertion  Gastrointestinal: negative  Genitourinary:positive for decreased stream  Hematologic/lymphatic: negative  Musculoskeletal:positive for arthralgias, stiff joints and muscle weakness  Neurological: negative  Behavioral/Psych: negative  Endocrine: negative  Allergic/Immunologic: negative    Patient Active Problem List   Diagnosis Code    Anxiety and depression F41.9, F32.9    History of hepatitis C Z86.19    Chronic shoulder pain M25.519, G89.29    HLD (hyperlipidemia) E78.5    BPH (benign prostatic hyperplasia) N40.0    Pulmonary cavitary lesion J98.4    Abnormal CXR R93.8    Elevated C-reactive protein (CRP) R79.82       Prior to Admission Medications   Prescriptions Last Dose Informant Patient Reported? Taking? DESVENLAFAXINE SUCCINATE (PRISTIQ PO) 6/15/2017 at Unknown time  Yes Yes   Sig: Take 100 mg by mouth daily. VALIUM 10 mg Tab   Yes Yes   Sig: Take 10 mg by mouth every four (4) hours as needed. amphetamine-dextroamphetamine (ADDERALL) 30 mg tablet 6/15/2017 at Unknown time  Yes Yes   Sig: Take 30 mg by mouth three (3) times daily. baclofen (LIORESAL) 20 mg tablet 6/15/2017 at Unknown time  No Yes   Sig: Take 1 Tab by mouth three (3) times daily. doxazosin (CARDURA) 4 mg tablet   No No   Sig: Take 1 Tab by mouth daily. Indications: SYMPTOMATIC BENIGN PROSTATIC HYPERPLASIA   gabapentin (NEURONTIN) 600 mg tablet 6/15/2017 at Unknown time  No Yes   Sig: Take 2 Tabs by mouth three (3) times daily. lamoTRIgine (LAMICTAL) 100 mg tablet 6/15/2017 at Unknown time  Yes Yes   Sig: TK 1 T PO Q DAY   levoFLOXacin (LEVAQUIN) 750 mg tablet   No No   Sig: Take 1 Tab by mouth daily. mirtazapine (REMERON) 30 mg tablet   Yes No   Si tabs po qhs   multivitamin, stress formula (STRESS TAB) tablet   Yes No   Sig: Take 1 Tab by mouth daily. omeprazole (PRILOSEC) 40 mg capsule   No No   Sig: Take 1 Cap by mouth daily. oxyCODONE IR (OXY-IR) 30 mg immediate release tablet   No Yes   Sig: Take 1 Tab by mouth every four (4) hours as needed for Pain.       Facility-Administered Medications: None       Past Medical History:   Diagnosis Date    Anxiety and depression 2012    BPH (benign prostatic hyperplasia) 2012    Chronic shoulder pain 2012    Colon polyps     Hepatitis C     diagnosed 10/08    History of hepatitis C 8/7/2012    History of hepatitis C 8/7/2012    HLD (hyperlipidemia) 8/7/2012    Other ill-defined conditions     had abscess on lung; liver problems    Psychiatric disorder     depression; anxiety     Past Surgical History:   Procedure Laterality Date    HX ORTHOPAEDIC      shoulders x4    HX OTHER SURGICAL      abscess removed from bilateral lungs     Social History     Social History    Marital status:      Spouse name: N/A    Number of children: N/A    Years of education: N/A     Occupational History    Not on file.      Social History Main Topics    Smoking status: Former Smoker     Packs/day: 0.50     Years: 6.00     Quit date: 12/1/2014    Smokeless tobacco: Never Used      Comment: Quit 09/09 > 10pk Hx    Alcohol use No    Drug use: No    Sexual activity: Not Currently     Other Topics Concern    Not on file     Social History Narrative     Family History   Problem Relation Age of Onset    Lung Disease Father     Cancer Father      lung     No Known Allergies    Current Facility-Administered Medications   Medication Dose Route Frequency    dextroamphetamine-amphetamine (ADDERALL) tablet 30 mg  30 mg Oral TID    baclofen (LIORESAL) tablet 20 mg  20 mg Oral TID    DESVENLAFAXINE SUCCINATE (PRISTIQ PO) - patient supplied  100 mg Oral BID    [START ON 6/16/2017] doxazosin (CARDURA) tablet 4 mg  4 mg Oral DAILY    gabapentin (NEURONTIN) capsule 1,200 mg  1,200 mg Oral TID    [START ON 6/16/2017] lamoTRIgine (LaMICtal) tablet 100 mg  100 mg Oral DAILY    mirtazapine (REMERON) tablet 30 mg  30 mg Oral QHS    [START ON 6/16/2017] multivitamin, stress formula (STRESS TAB) tablet 1 Tab  1 Tab Oral DAILY    [START ON 6/16/2017] pantoprazole (PROTONIX) tablet 40 mg  40 mg Oral ACB    oxyCODONE IR (OXY-IR) immediate release tablet 30 mg  30 mg Oral Q4H PRN    diazePAM (VALIUM) tablet 10 mg  10 mg Oral Q4H PRN    sodium chloride (NS) flush 5-10 mL  5-10 mL IntraVENous Q8H    sodium chloride (NS) flush 5-10 mL  5-10 mL IntraVENous PRN    albuterol (PROVENTIL VENTOLIN) nebulizer solution 2.5 mg  2.5 mg Nebulization Q6H PRN    cefTRIAXone (ROCEPHIN) 1 g in 0.9% sodium chloride (MBP/ADV) 50 mL  1 g IntraVENous Q24H    azithromycin (ZITHROMAX) 500 mg in 0.9% sodium chloride (MBP/ADV) 250 mL  500 mg IntraVENous Q24H    acetaminophen (TYLENOL) tablet 650 mg  650 mg Oral Q4H PRN    morphine injection 1 mg  1 mg IntraVENous Q4H PRN    naloxone (NARCAN) injection 0.4 mg  0.4 mg IntraVENous PRN    ondansetron (ZOFRAN) injection 4 mg  4 mg IntraVENous Q4H PRN    senna-docusate (PERICOLACE) 8.6-50 mg per tablet 1 Tab  1 Tab Oral BID PRN    LORazepam (ATIVAN) injection 1 mg  1 mg IntraVENous Q6H PRN    enoxaparin (LOVENOX) injection 40 mg  40 mg SubCUTAneous Q24H    saline peripheral flush soln 10 mL  10 mL InterCATHeter RAD ONCE    sodium chloride 0.9 % bolus infusion 100 mL  100 mL IntraVENous RAD ONCE    iopamidol (ISOVUE-370) 76 % injection 80 mL  80 mL IntraVENous RAD ONCE           Objective:     Vitals:    06/15/17 1212 06/15/17 1255   BP: 108/72    Pulse: 77    Resp: 17    Temp: 98.1 °F (36.7 °C)    SpO2: 97%    Weight:  154 lb (69.9 kg)   Height:  5' 7\" (1.702 m)       PHYSICAL EXAM     Constitutional:  the patient is thin and in no acute distress, on RA  EENMT:  Sclera clear, pupils equal, oral mucosa moist  Respiratory: crackles  Cardiovascular:  RRR without M,G,R  Gastrointestinal: soft and non-tender; with positive bowel sounds. Musculoskeletal: warm without cyanosis. There is no lower leg edema, + holmans sign  Skin:  no jaundice or rashes, no wounds   Neurologic: no gross neuro deficits     Psychiatric:  alert and oriented x 3    Chest CT:             No results for input(s): WBC, HGB, HCT, PLT, INR, HGBEXT, HCTEXT, PLTEXT, HGBEXT, HCTEXT, PLTEXT in the last 72 hours.     No lab exists for component: INREXT, INREXT  No results for input(s): NA, K, CL, GLU, CO2, BUN, CREA, MG, PHOS, CA, TROIQ, ALB, TBIL, TBILI, GPT, ALT, SGOT, BNPP in the last 72 hours. No lab exists for component: TROIP  No results for input(s): PH, PCO2, PO2, HCO3 in the last 72 hours. No results for input(s): LCAD, LAC in the last 72 hours. Assessment:  (Medical Decision Making)     Hospital Problems  Date Reviewed: 6/15/2017          Codes Class Noted POA    * (Principal)Pulmonary cavitary lesion-pt to be started on abx, plan for EBUS/navigational bronchoscopy ICD-10-CM: J98.4  ICD-9-CM: 518.89  6/15/2017 Unknown        Abnormal CXR-check chest CT ICD-10-CM: R93.8  ICD-9-CM: 793.2  6/15/2017 Unknown        Elevated C-reactive protein (CRP)-hold off on steroids until pt has bronch ICD-10-CM: R79.82  ICD-9-CM: 790.95  6/15/2017 Unknown        Anxiety and depression (Chronic)-continue home medications ICD-10-CM: F41.9, F32.9  ICD-9-CM: 300.00, 311  8/7/2012 Yes        History of hepatitis C ICD-10-CM: Z86.19  ICD-9-CM: V12.09  8/7/2012 Yes    Overview Signed 8/7/2012 10:55 AM by Rochelle Mondragon III     S/p treatment             Chronic shoulder pain (Chronic)-continue home medications ICD-10-CM: M25.519, G89.29  ICD-9-CM: 719.41, 338.29  8/7/2012 Yes        HLD (hyperlipidemia) (Chronic)-chronic  ICD-10-CM: E78.5  ICD-9-CM: 272.4  8/7/2012 Yes        BPH (benign prostatic hyperplasia)-chronic  ICD-10-CM: N40.0  ICD-9-CM: 600.00  8/7/2012 Yes              Plan:  (Medical Decision Making)     --Will admit for further medical management  --Supplemental O2   --Respiratory nebulizer treatments  --antibiotic therapy    More than 50% of the time documented was spent in face-to-face contact with the patient and in the care of the patient on the floor/unit where the patient is located. Andrews Benito, PA       Lungs:  CTA B, no w/r/r  Heart:  RRR with no Murmur/Rubs/Gallops    Additional Comments:    Patient with several months of symptoms.  CT chest with multiple right sided cavitary lesions. These do not have the appearance of a primary lung cancer with mets, rather would suspect they are infectious or inflammatory in nature. Will cover with abx, check pct and blood cultures. Check UA, ANCA for possible wegener's. Check anti-GBM ab. Will need bronch, preferably neena with EBUS. Will check on available scheduling for this. Alternatively, if ANCA is positive renal biopsy could be considered. I have spoken with and examined the patient. I agree with the above assessment and plan as documented.     Ginger Meyer MD

## 2017-06-16 PROBLEM — N40.0 BPH (BENIGN PROSTATIC HYPERPLASIA): Chronic | Status: ACTIVE | Noted: 2017-06-16

## 2017-06-16 LAB
C-ANCA TITR SER IF: NORMAL TITER
GBM IGG SER-ACNC: 75 UNITS (ref 0–20)
MYELOPEROXIDASE AB SER IA-ACNC: <9 U/ML (ref 0–9)
P-ANCA ATYPICAL TITR SER IF: NORMAL TITER
P-ANCA TITR SER IF: NORMAL TITER
PROTEINASE3 AB SER IA-ACNC: <3.5 U/ML (ref 0–3.5)

## 2017-06-16 PROCEDURE — 74011250636 HC RX REV CODE- 250/636: Performed by: INTERNAL MEDICINE

## 2017-06-16 PROCEDURE — 74011250637 HC RX REV CODE- 250/637: Performed by: PHYSICIAN ASSISTANT

## 2017-06-16 PROCEDURE — 99232 SBSQ HOSP IP/OBS MODERATE 35: CPT | Performed by: INTERNAL MEDICINE

## 2017-06-16 PROCEDURE — 74011000258 HC RX REV CODE- 258: Performed by: PHYSICIAN ASSISTANT

## 2017-06-16 PROCEDURE — 65270000029 HC RM PRIVATE

## 2017-06-16 PROCEDURE — 74011250636 HC RX REV CODE- 250/636: Performed by: PHYSICIAN ASSISTANT

## 2017-06-16 RX ADMIN — SODIUM CHLORIDE 100 ML/HR: 900 INJECTION, SOLUTION INTRAVENOUS at 05:32

## 2017-06-16 RX ADMIN — DEXTROAMPHETAMINE SACCHARATE, AMPHETAMINE ASPARTATE, DEXTROAMPHETAMINE SULFATE AND AMPHETAMINE SULFATE 30 MG: 3.75; 3.75; 3.75; 3.75 TABLET ORAL at 17:07

## 2017-06-16 RX ADMIN — BACLOFEN 20 MG: 10 TABLET ORAL at 21:28

## 2017-06-16 RX ADMIN — GABAPENTIN 1200 MG: 400 CAPSULE ORAL at 17:08

## 2017-06-16 RX ADMIN — CEFTRIAXONE 1 G: 1 INJECTION, POWDER, FOR SOLUTION INTRAMUSCULAR; INTRAVENOUS at 17:08

## 2017-06-16 RX ADMIN — DEXTROAMPHETAMINE SACCHARATE, AMPHETAMINE ASPARTATE, DEXTROAMPHETAMINE SULFATE AND AMPHETAMINE SULFATE 30 MG: 3.75; 3.75; 3.75; 3.75 TABLET ORAL at 08:59

## 2017-06-16 RX ADMIN — B-COMPLEX W/ C & FOLIC ACID TAB 1 TABLET: TAB at 08:59

## 2017-06-16 RX ADMIN — BACLOFEN 20 MG: 10 TABLET ORAL at 08:59

## 2017-06-16 RX ADMIN — AZITHROMYCIN MONOHYDRATE 500 MG: 500 INJECTION, POWDER, LYOPHILIZED, FOR SOLUTION INTRAVENOUS at 23:54

## 2017-06-16 RX ADMIN — GABAPENTIN 1200 MG: 400 CAPSULE ORAL at 08:59

## 2017-06-16 RX ADMIN — MIRTAZAPINE 30 MG: 15 TABLET, FILM COATED ORAL at 21:28

## 2017-06-16 RX ADMIN — OXYCODONE HYDROCHLORIDE 30 MG: 15 TABLET ORAL at 21:28

## 2017-06-16 RX ADMIN — SODIUM CHLORIDE 100 ML/HR: 900 INJECTION, SOLUTION INTRAVENOUS at 17:06

## 2017-06-16 RX ADMIN — DEXTROAMPHETAMINE SACCHARATE, AMPHETAMINE ASPARTATE, DEXTROAMPHETAMINE SULFATE AND AMPHETAMINE SULFATE 30 MG: 3.75; 3.75; 3.75; 3.75 TABLET ORAL at 21:28

## 2017-06-16 RX ADMIN — OXYCODONE HYDROCHLORIDE 30 MG: 15 TABLET ORAL at 13:03

## 2017-06-16 RX ADMIN — Medication 10 ML: at 17:10

## 2017-06-16 RX ADMIN — DIAZEPAM 10 MG: 5 TABLET ORAL at 12:15

## 2017-06-16 RX ADMIN — LAMOTRIGINE 100 MG: 100 TABLET ORAL at 08:59

## 2017-06-16 RX ADMIN — Medication 10 ML: at 05:31

## 2017-06-16 RX ADMIN — GABAPENTIN 1200 MG: 400 CAPSULE ORAL at 21:28

## 2017-06-16 RX ADMIN — ENOXAPARIN SODIUM 40 MG: 40 INJECTION SUBCUTANEOUS at 17:07

## 2017-06-16 RX ADMIN — OXYCODONE HYDROCHLORIDE 30 MG: 15 TABLET ORAL at 17:07

## 2017-06-16 RX ADMIN — DOXAZOSIN 4 MG: 4 TABLET ORAL at 08:59

## 2017-06-16 RX ADMIN — Medication 10 ML: at 21:32

## 2017-06-16 RX ADMIN — PANTOPRAZOLE SODIUM 40 MG: 40 TABLET, DELAYED RELEASE ORAL at 05:30

## 2017-06-16 RX ADMIN — BACLOFEN 20 MG: 10 TABLET ORAL at 17:07

## 2017-06-16 RX ADMIN — OXYCODONE HYDROCHLORIDE 30 MG: 15 TABLET ORAL at 08:59

## 2017-06-16 NOTE — PROGRESS NOTES
VSs reported by Agatha Mcqueen CNA, HR mechanical uosfmar=251, nursing to bedside radial =72, pt calm and coherent, also report bp=85/52, pt asymptomatic, pt informed  That nursing will not be able to frequent dose with narcotics until BP comes up higher, nursing will re ck kae in 1 hour

## 2017-06-16 NOTE — ROUTINE PROCESS
CT pushed to Super D protocol for pending navigational bronchoscopy. Ok to to move navigational bronchoscopy to 6/20/17 per Dr Shannon Luna.

## 2017-06-16 NOTE — PROGRESS NOTES
Problem: Nutrition Deficit  Goal: *Optimize nutritional status  Nutrition  Reason for assessment: Referral received from nursing admission Malnutrition Screening Tool for recently lost >33# without trying and eating poorly due to decreased appetite. Assessment:   Diet order(s): Regular  Food/Nutrition Patient History:  Patient with a h/o hep C and BPH. Patient currently admitted with a plumonary cavitary lesion and reports a recent weight loss of ~38 lb weight loss over the past few months. States that he has not changed anything with his appetite and that he eats everything on his plate. Patient does report some chewing difficulties and states that he is unable to chew the pork chop today at lunch. Patient specifically states that he mainly has issues with tougher meats, declines gi soft diet. Weights per EMR cannot be verified as accurate due to unknown weight source (pt stated vs estimated vs measured). Weight Loss Metrics 6/15/2017 6/15/2017 6/8/2017 3/1/2017   Today's Wt 154 lb 154 lb 152 lb 192 lb 3.2 oz   BMI 24.12 kg/m2 24.12 kg/m2 23.81 kg/m2 30.1 kg/m2       Weight Loss Metrics 12/1/2016 9/1/2016 6/1/2016   Today's Wt 187 lb 192 lb 6.4 oz 189 lb 9.6 oz   BMI 29.29 kg/m2 30.13 kg/m2 29.69 kg/m2   Per EMR patient has lost ~38 lbs over the past 3 months. This is a clinically significant 19.2% weight loss over the past 3 months. Patient with evident muscle wasting via protruding clavicles and sunken temporal lobes. Anthropometrics:Height: 5' 7\" (170.2 cm),  Weight: 69.9 kg (154 lb),  Body mass index is 24.12 kg/(m^2). BMI class of normal weight. Macronutrient needs:  EER:  9715-4077 kcal /day (25-30 kcal/kg actual BW)  EPR:  67-87 grams protein/day (1-1.3 grams/kg IBW)  Intake/Comparative Standards: Average intake for past 2 recorded meal(s): 73%.  This potentially meets ~96% of kcal and ~100% of protein needs     Nutrition Diagnosis: Malnutrition related to weight loss and muscle wasting as evidenced by patient with clinically significant 38lb/19.2% weight loss and sunken temporal lobes with protruding clavicles. Meets Criteria for Malnutrition in the context of Acute Illness/Injury   [X] Severe Malnutrition, as evidenced by:              [X] Moderate to severe loss of muscle mass              [ ] Nutritional intake of <50% of energy intake compared to estimated energy needs for > 5 days              [X] Weight loss of >2% in 1 week, >5% in 1 month, >7.5% in 3 months              [ ] Moderate to severe edema              [ ] Moderate to severe loss of subcutaneous fat                           Intervention:  Meals and snacks: Continue current diet. Nutrition Supplement Therapy: Add magic cup to lunch and dinner daily      Compa [address], [address],  [phone]

## 2017-06-16 NOTE — PROGRESS NOTES
Gera Euceda Adcox  Admission Date: 6/15/2017             Daily Progress Note: 6/16/2017    The patient's chart is reviewed and the patient is discussed with the staff. 60 yo male with a history of depression, anxiety, ADD,Hepatitis C, BPH, and chronic pain. He has been feeling poorly for months and has lost 40lbs over the last 3 months secondary to anorexia. He also had chills, shortness of breath, and edema. CT chest 4/21/17 revealed RLL 4.4 cm x 3.1cm density. He had chest CT 5/8/17 which revealed multiple large infiltrative nodules in R lung concerning for cavitation. He was seen 6/8 by Dr. Alea Hodge, labs collected were reviewed and pt was started on LVQ on 6/9. He was seen by Dr. Alea Hodge the morning of admission, was not any better, was hypoxic on RA, and was referred to the hospital for admission / ongoing workup of cavitary lesion. He has a history of smoking <1 ppd x 10 years off and on. He quit smoking 2014. Subjective:     Received bolus for hypotension overnight. AF, currently on O2 at 2 lpm via NC with O2 sat 94%. Denies cough or mucus production. Does have some chest discomfort. Intermittent palpitations. Denies n/v.  Ongoing c/o LLE pain.       Current Facility-Administered Medications   Medication Dose Route Frequency    dextroamphetamine-amphetamine (ADDERALL) tablet 30 mg  30 mg Oral TID    baclofen (LIORESAL) tablet 20 mg  20 mg Oral TID    DESVENLAFAXINE SUCCINATE (PRISTIQ PO) - patient supplied  100 mg Oral BID    doxazosin (CARDURA) tablet 4 mg  4 mg Oral DAILY    gabapentin (NEURONTIN) capsule 1,200 mg  1,200 mg Oral TID    lamoTRIgine (LaMICtal) tablet 100 mg  100 mg Oral DAILY    mirtazapine (REMERON) tablet 30 mg  30 mg Oral QHS    multivitamin, stress formula (STRESS TAB) tablet 1 Tab  1 Tab Oral DAILY    pantoprazole (PROTONIX) tablet 40 mg  40 mg Oral ACB    oxyCODONE IR (OXY-IR) immediate release tablet 30 mg  30 mg Oral Q4H PRN    diazePAM (VALIUM) tablet 10 mg  10 mg Oral Q4H PRN    sodium chloride (NS) flush 5-10 mL  5-10 mL IntraVENous Q8H    sodium chloride (NS) flush 5-10 mL  5-10 mL IntraVENous PRN    albuterol (PROVENTIL VENTOLIN) nebulizer solution 2.5 mg  2.5 mg Nebulization Q6H PRN    cefTRIAXone (ROCEPHIN) 1 g in 0.9% sodium chloride (MBP/ADV) 50 mL  1 g IntraVENous Q24H    azithromycin (ZITHROMAX) 500 mg in 0.9% sodium chloride (MBP/ADV) 250 mL  500 mg IntraVENous Q24H    acetaminophen (TYLENOL) tablet 650 mg  650 mg Oral Q4H PRN    morphine injection 1 mg  1 mg IntraVENous Q4H PRN    naloxone (NARCAN) injection 0.4 mg  0.4 mg IntraVENous PRN    ondansetron (ZOFRAN) injection 4 mg  4 mg IntraVENous Q4H PRN    senna-docusate (PERICOLACE) 8.6-50 mg per tablet 1 Tab  1 Tab Oral BID PRN    LORazepam (ATIVAN) injection 1 mg  1 mg IntraVENous Q6H PRN    enoxaparin (LOVENOX) injection 40 mg  40 mg SubCUTAneous Q24H    0.9% sodium chloride infusion  100 mL/hr IntraVENous CONTINUOUS       Review of Systems  Constitutional: negative for fever, chills, sweats  Cardiovascular: negative for chest pain, palpitations, syncope, edema  Gastrointestinal:  negative for dysphagia, reflux, vomiting, diarrhea, abdominal pain, or melena  Neurologic:  negative for focal weakness, numbness, headache    Objective:     Vitals:    06/15/17 2048 06/15/17 2319 06/16/17 0332 06/16/17 0740   BP: 114/67 102/71 96/64 100/67   Pulse:  77 72 67   Resp:  18 18 18   Temp:  97.7 °F (36.5 °C) 97.9 °F (36.6 °C) 96.9 °F (36.1 °C)   SpO2:  91% 95% 94%   Weight:       Height:         Intake and Output:   06/14 1901 - 06/16 0700  In: -   Out: 750 [Urine:750]       Physical Exam:   Constitution:  the patient is well developed and in no acute distress  EENMT:  Sclera clear, pupils equal, oral mucosa moist  Respiratory: diminished, clear to auscultation bilaterally, O2 at 2 lpm  Cardiovascular:  RRR without M,G,R  Gastrointestinal: soft and non-tender; with positive bowel sounds. Musculoskeletal: warm without cyanosis. There is no lower leg edema. Skin:  no jaundice or rashes, no open wounds   Neurologic: no gross neuro deficits     Psychiatric:  alert and oriented x 3    CHEST XRAY:   6/15 LE doppler  No evidence of left lower extremity deep venous thrombosis. 6/15                LAB   Recent Labs      06/15/17   1458   WBC  8.0   HGB  10.9*   HCT  33.8*   PLT  463*     Recent Labs      06/15/17   1458   NA  143   K  3.9   CL  104   CO2  30   GLU  93   BUN  6   CREA  1.00   CA  9.3   ALB  2.3*   TBILI  0.2   ALT  22   SGOT  22     No results for input(s): PH, PCO2, PO2, HCO3 in the last 72 hours. No results for input(s): LCAD, LAC in the last 72 hours.       Assessment:  (Medical Decision Making)     Patient Active Problem List   Diagnosis Code    Anxiety and depression F41.9, F32.9    History of hepatitis C Z86.19    Chronic shoulder pain M25.519, G89.29    HLD (hyperlipidemia) E78.5    BPH (benign prostatic hyperplasia) N40.0    Pulmonary cavitary lesion J98.4    Abnormal CXR R93.8    Elevated C-reactive protein (CRP) R79.82         Plan:  (Medical Decision Making)     Hospital Problems  Date Reviewed: 6/16/2017          Codes Class Noted POA    BPH (benign prostatic hyperplasia) (Chronic) ICD-10-CM: N40.0  ICD-9-CM: 600.00  6/16/2017 Yes        * (Principal)Pulmonary cavitary lesion ICD-10-CM: J98.4  ICD-9-CM: 518.89  6/15/2017 Yes    Steroids on hold with plans for navigational bronch / EBUS  abx  WBC / PCT WNL      Abnormal CXR ICD-10-CM: R93.8  ICD-9-CM: 793.2  6/15/2017 Yes    With cavitary lesions      Elevated C-reactive protein (CRP) ICD-10-CM: R79.82  ICD-9-CM: 790.95  6/15/2017 Yes        Anxiety and depression (Chronic) ICD-10-CM: F41.9, F32.9  ICD-9-CM: 300.00, 311  8/7/2012 Yes    Chronic      History of hepatitis C ICD-10-CM: Z86.19  ICD-9-CM: V12.09  8/7/2012 Yes     S/p treatment         Chronic shoulder pain (Chronic) ICD-10-CM: M25.519, G89.29  ICD-9-CM: 719.41, 338.29  8/7/2012 Yes        HLD (hyperlipidemia) (Chronic) ICD-10-CM: E78.5  ICD-9-CM: 272.4  8/7/2012 Yes         --Zithromax / Rocephin   PCT <0.1   WBC 8.0   6/15 BC: NGTD x 2 / 6/9 BC: negative x 2  --IVF at 100 ml/hr  --steroids on hold until after bronch  --planning bronch (+ navigational EBUS) - currently scheduled for 6/20/17  --ANCA, anti-GBM ab pending    More than 50% of the time documented was spent in face-to-face contact with the patient and in the care of the patient on the floor/unit where the patient is located. Dnucan Bolanos NP    The patient has been seen and examined by me personally, the chart,labs, and radiographic studies have been reviewed. Chest:Coarse bilaterally  Extremities: 1+ edema    I agree with the above assessment and plan.     Jeff Ball MD.

## 2017-06-16 NOTE — PROGRESS NOTES
Pt alert, denies needs, no visible signs of distress, does state that he needs his pain meds at this time , nursing will follow

## 2017-06-16 NOTE — PROGRESS NOTES
Pt has been educated to NPO status that will begin 6/19 after midnight for bronch, verbalizes understanding

## 2017-06-17 PROBLEM — M31.0 GOODPASTURE SYNDROME (HCC): Status: ACTIVE | Noted: 2017-06-17

## 2017-06-17 PROCEDURE — 74011250636 HC RX REV CODE- 250/636: Performed by: INTERNAL MEDICINE

## 2017-06-17 PROCEDURE — 74011000258 HC RX REV CODE- 258: Performed by: PHYSICIAN ASSISTANT

## 2017-06-17 PROCEDURE — 65270000029 HC RM PRIVATE

## 2017-06-17 PROCEDURE — 99233 SBSQ HOSP IP/OBS HIGH 50: CPT | Performed by: INTERNAL MEDICINE

## 2017-06-17 PROCEDURE — 74011250636 HC RX REV CODE- 250/636: Performed by: PHYSICIAN ASSISTANT

## 2017-06-17 PROCEDURE — 74011000258 HC RX REV CODE- 258: Performed by: INTERNAL MEDICINE

## 2017-06-17 PROCEDURE — 94760 N-INVAS EAR/PLS OXIMETRY 1: CPT

## 2017-06-17 PROCEDURE — 74011250637 HC RX REV CODE- 250/637: Performed by: PHYSICIAN ASSISTANT

## 2017-06-17 RX ORDER — CYCLOPHOSPHAMIDE 50 MG/1
150 CAPSULE ORAL DAILY
Status: DISCONTINUED | OUTPATIENT
Start: 2017-06-17 | End: 2017-06-17 | Stop reason: SDUPTHER

## 2017-06-17 RX ORDER — CYCLOPHOSPHAMIDE 50 MG/1
150 CAPSULE ORAL EVERY 24 HOURS
Status: DISCONTINUED | OUTPATIENT
Start: 2017-06-17 | End: 2017-06-17

## 2017-06-17 RX ORDER — CYCLOPHOSPHAMIDE 50 MG/1
150 CAPSULE ORAL EVERY 24 HOURS
Status: DISCONTINUED | OUTPATIENT
Start: 2017-06-17 | End: 2017-06-29 | Stop reason: HOSPADM

## 2017-06-17 RX ADMIN — GABAPENTIN 1200 MG: 400 CAPSULE ORAL at 22:24

## 2017-06-17 RX ADMIN — GABAPENTIN 1200 MG: 400 CAPSULE ORAL at 15:52

## 2017-06-17 RX ADMIN — LAMOTRIGINE 100 MG: 100 TABLET ORAL at 08:46

## 2017-06-17 RX ADMIN — DOXAZOSIN 4 MG: 4 TABLET ORAL at 08:45

## 2017-06-17 RX ADMIN — ENOXAPARIN SODIUM 40 MG: 40 INJECTION SUBCUTANEOUS at 15:41

## 2017-06-17 RX ADMIN — SODIUM CHLORIDE 100 ML/HR: 900 INJECTION, SOLUTION INTRAVENOUS at 06:07

## 2017-06-17 RX ADMIN — BACLOFEN 20 MG: 10 TABLET ORAL at 22:24

## 2017-06-17 RX ADMIN — DESVENLAFAXINE 100 MG: 100 TABLET, EXTENDED RELEASE ORAL at 09:00

## 2017-06-17 RX ADMIN — DEXTROAMPHETAMINE SACCHARATE, AMPHETAMINE ASPARTATE, DEXTROAMPHETAMINE SULFATE AND AMPHETAMINE SULFATE 30 MG: 3.75; 3.75; 3.75; 3.75 TABLET ORAL at 22:24

## 2017-06-17 RX ADMIN — BACLOFEN 20 MG: 10 TABLET ORAL at 08:45

## 2017-06-17 RX ADMIN — Medication 10 ML: at 06:06

## 2017-06-17 RX ADMIN — CEFTRIAXONE 1 G: 1 INJECTION, POWDER, FOR SOLUTION INTRAMUSCULAR; INTRAVENOUS at 09:00

## 2017-06-17 RX ADMIN — MIRTAZAPINE 30 MG: 15 TABLET, FILM COATED ORAL at 22:24

## 2017-06-17 RX ADMIN — PANTOPRAZOLE SODIUM 40 MG: 40 TABLET, DELAYED RELEASE ORAL at 06:06

## 2017-06-17 RX ADMIN — DEXTROAMPHETAMINE SACCHARATE, AMPHETAMINE ASPARTATE, DEXTROAMPHETAMINE SULFATE AND AMPHETAMINE SULFATE 30 MG: 3.75; 3.75; 3.75; 3.75 TABLET ORAL at 15:52

## 2017-06-17 RX ADMIN — OXYCODONE HYDROCHLORIDE 30 MG: 15 TABLET ORAL at 08:45

## 2017-06-17 RX ADMIN — B-COMPLEX W/ C & FOLIC ACID TAB 1 TABLET: TAB at 08:45

## 2017-06-17 RX ADMIN — CYCLOPHOSPHAMIDE 150 MG: 50 CAPSULE ORAL at 20:01

## 2017-06-17 RX ADMIN — DEXTROAMPHETAMINE SACCHARATE, AMPHETAMINE ASPARTATE, DEXTROAMPHETAMINE SULFATE AND AMPHETAMINE SULFATE 30 MG: 3.75; 3.75; 3.75; 3.75 TABLET ORAL at 08:45

## 2017-06-17 RX ADMIN — OXYCODONE HYDROCHLORIDE 30 MG: 15 TABLET ORAL at 20:07

## 2017-06-17 RX ADMIN — OXYCODONE HYDROCHLORIDE 30 MG: 15 TABLET ORAL at 14:07

## 2017-06-17 RX ADMIN — GABAPENTIN 1200 MG: 400 CAPSULE ORAL at 08:45

## 2017-06-17 RX ADMIN — SODIUM CHLORIDE 100 ML/HR: 900 INJECTION, SOLUTION INTRAVENOUS at 15:41

## 2017-06-17 RX ADMIN — BACLOFEN 20 MG: 10 TABLET ORAL at 15:52

## 2017-06-17 RX ADMIN — AZITHROMYCIN MONOHYDRATE 500 MG: 500 INJECTION, POWDER, LYOPHILIZED, FOR SOLUTION INTRAVENOUS at 15:52

## 2017-06-17 RX ADMIN — OXYCODONE HYDROCHLORIDE 30 MG: 15 TABLET ORAL at 02:55

## 2017-06-17 RX ADMIN — SODIUM CHLORIDE 1000 MG: 900 INJECTION, SOLUTION INTRAVENOUS at 14:08

## 2017-06-17 RX ADMIN — Medication 5 ML: at 14:16

## 2017-06-17 NOTE — CONSULTS
Subjective:     Mr Basilio Herring is a 61year old   gentleman referred for possible renal biopsy. Mr Madie Matias has known dyslipidemia, depression, chronic hepatitis C, BPH. He has had malaise and weight loss for months and was found to have a R lower lobe cavitary pulmonary lesion. He has not had any cough nor hemoptysis. He also reports some  Dyspnea. There was also transient bipedal edema a few weeks ago that resolved spontaneously. Urinalysis showed low-grade microhematuria last week along with mild proteinuria; the repeat this week is negative for protein and blood. Serum creatinine has varied from 0.89 to 1.01 thus far. PEDRO, ANCA, anti-MPO and anti-Pr 3 are negative. 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 He is getting started on methylprednisolone and cyclophosphamide. Patient Active Problem List    Diagnosis Date Noted    Goodpasture syndrome ([facility] [address] 06/17/2017    BPH (benign prostatic hyperplasia) 06/16/2017    Pulmonary cavitary lesion 06/15/2017    Abnormal CXR 06/15/2017    Elevated C-reactive protein (CRP) 06/15/2017    Anxiety and depression 08/07/2012    History of hepatitis C 08/07/2012    Chronic shoulder pain 08/07/2012    HLD (hyperlipidemia) 08/07/2012     Past Medical History:   Diagnosis Date    Anxiety and depression 8/7/2012    BPH (benign prostatic hyperplasia) 8/7/2012    Chronic shoulder pain 8/7/2012    Colon polyps     Hepatitis C     diagnosed 10/08    History of hepatitis C 8/7/2012    History of hepatitis C 8/7/2012    HLD (hyperlipidemia) 8/7/2012    Other ill-defined conditions     had abscess on lung; liver problems    Psychiatric disorder     depression; anxiety      Past Surgical History:   Procedure Laterality Date    HX ORTHOPAEDIC      shoulders x4    HX OTHER SURGICAL      abscess removed from bilateral lungs      Prior to Admission medications    Medication Sig Start Date End Date Taking?  Authorizing Provider   oxyCODONE IR (OXY-IR) 30 mg immediate release tablet Take 1 Tab by mouth every four (4) hours as needed for Pain. 7/8/17  Yes Dee Dee Kim MD   lamoTRIgine (LAMICTAL) 100 mg tablet TK 1 T PO Q DAY 11/13/16  Yes Historical Provider   baclofen (LIORESAL) 20 mg tablet Take 1 Tab by mouth three (3) times daily. 12/1/16  Yes April Audrie Brittle, MD   gabapentin (NEURONTIN) 600 mg tablet Take 2 Tabs by mouth three (3) times daily. 12/1/16  Yes April Audrie Brittle, MD   amphetamine-dextroamphetamine (ADDERALL) 30 mg tablet Take 30 mg by mouth three (3) times daily. Yes Historical Provider   DESVENLAFAXINE SUCCINATE (PRISTIQ PO) Take 100 mg by mouth daily. Yes Destini Altamirano MD   VALIUM 10 mg Tab Take 10 mg by mouth every four (4) hours as needed. Yes Historical Provider   levoFLOXacin (LEVAQUIN) 750 mg tablet Take 1 Tab by mouth daily. 6/9/17   April Audrie Brittle, MD   doxazosin (CARDURA) 4 mg tablet Take 1 Tab by mouth daily. Indications: SYMPTOMATIC BENIGN PROSTATIC HYPERPLASIA 6/8/17   Dee Dee Kim MD   omeprazole (PRILOSEC) 40 mg capsule Take 1 Cap by mouth daily. 3/1/17   April Audrie Brittle, MD   mirtazapine (REMERON) 30 mg tablet 2 tabs po qhs    Historical Provider   multivitamin, stress formula (STRESS TAB) tablet Take 1 Tab by mouth daily.     Historical Provider      No Known Allergies   Social History   Substance Use Topics    Smoking status: Former Smoker     Packs/day: 0.50     Years: 6.00     Quit date: 12/1/2014    Smokeless tobacco: Never Used      Comment: Quit 09/09 > 10pk Hx    Alcohol use No    used to work for [employer] History   Problem Relation Age of Onset    Lung Disease Father     Cancer Father      lung        Review of Systems  +anorexia, weight loss as noted above  No chest pain, no dyspnea currewntly, no palpitations  No productive cough  No headaches, no loss of  Consciousness, seizures  No emesis, no diarrhea  No lower urinary tract symptoms  No bleeding anywhere        Objective:     Current Facility-Administered Medications   Medication Dose Route Frequency    cyclophosphamide (CYTOXAN) chemo capsule 150 mg  150 mg Oral DAILY    methylPREDNISolone (PF) (SOLU-MEDROL) 1,000 mg in 0.9% sodium chloride 100 mL IVPB  1,000 mg IntraVENous DAILY    dextroamphetamine-amphetamine (ADDERALL) tablet 30 mg  30 mg Oral TID    baclofen (LIORESAL) tablet 20 mg  20 mg Oral TID    DESVENLAFAXINE SUCCINATE (PRISTIQ PO) - patient supplied  100 mg Oral BID    doxazosin (CARDURA) tablet 4 mg  4 mg Oral DAILY    gabapentin (NEURONTIN) capsule 1,200 mg  1,200 mg Oral TID    lamoTRIgine (LaMICtal) tablet 100 mg  100 mg Oral DAILY    mirtazapine (REMERON) tablet 30 mg  30 mg Oral QHS    multivitamin, stress formula (STRESS TAB) tablet 1 Tab  1 Tab Oral DAILY    pantoprazole (PROTONIX) tablet 40 mg  40 mg Oral ACB    oxyCODONE IR (OXY-IR) immediate release tablet 30 mg  30 mg Oral Q4H PRN    diazePAM (VALIUM) tablet 10 mg  10 mg Oral Q4H PRN    sodium chloride (NS) flush 5-10 mL  5-10 mL IntraVENous Q8H    sodium chloride (NS) flush 5-10 mL  5-10 mL IntraVENous PRN    albuterol (PROVENTIL VENTOLIN) nebulizer solution 2.5 mg  2.5 mg Nebulization Q6H PRN    cefTRIAXone (ROCEPHIN) 1 g in 0.9% sodium chloride (MBP/ADV) 50 mL  1 g IntraVENous Q24H    azithromycin (ZITHROMAX) 500 mg in 0.9% sodium chloride (MBP/ADV) 250 mL  500 mg IntraVENous Q24H    acetaminophen (TYLENOL) tablet 650 mg  650 mg Oral Q4H PRN    morphine injection 1 mg  1 mg IntraVENous Q4H PRN    naloxone (NARCAN) injection 0.4 mg  0.4 mg IntraVENous PRN    ondansetron (ZOFRAN) injection 4 mg  4 mg IntraVENous Q4H PRN    senna-docusate (PERICOLACE) 8.6-50 mg per tablet 1 Tab  1 Tab Oral BID PRN    LORazepam (ATIVAN) injection 1 mg  1 mg IntraVENous Q6H PRN    enoxaparin (LOVENOX) injection 40 mg  40 mg SubCUTAneous Q24H    0.9% sodium chloride infusion  100 mL/hr IntraVENous CONTINUOUS       Patient Vitals for the past 8 hrs:   BP Temp Pulse Resp SpO2   06/17/17 1115 92/58 97.6 °F (36.4 °C) 64 18 97 %   17 0704 111/71 97.7 °F (36.5 °C) 63 18 99 %     Temp (24hrs), Av.7 °F (36.5 °C), Min:97.6 °F (36.4 °C), Max:98 °F (36.7 °C)    This shift thus far: In: 0.7L; Out: 0.7L urine  Alert, communicative, no distress  Pink conjunctivae  No neck masses  Min basilar crackles  S1, s2, no rubs  Abdomen soft, nontender  No leg edema     Assessment:   New cavitary lesion with positive serology for GBM antibody  -being started on steroids and cyclophosphamide  -GFR preserved; most recent UA w/o blood or protein.  Will not plan on renal biopsy for now but will continue following.  -will ask heme to see for consideration for plasmapheresis

## 2017-06-17 NOTE — PROGRESS NOTES
Carolynn Funk Adcox  Admission Date: 6/15/2017             Daily Progress Note: 6/17/2017    The patient's chart is reviewed and the patient is discussed with the staff. 62 yo male with a history of depression, anxiety, ADD,Hepatitis C, BPH, and chronic pain. He has been feeling poorly for months and has lost 40lbs over the last 3 months secondary to anorexia. He also had chills, shortness of breath, and edema. CT chest 4/21/17 revealed RLL 4.4 cm x 3.1cm density. He had chest CT 5/8/17 which revealed multiple large infiltrative nodules in R lung concerning for cavitation. He was seen 6/8 by Dr. Wolf Curtis, labs collected were reviewed and pt was started on LVQ on 6/9. He was seen by Dr. Wolf Curtis the morning of admission, was not any better, was hypoxic on RA, and was referred to the hospital for admission / ongoing workup of cavitary lesion. He has a history of smoking <1 ppd x 10 years off and on. He quit smoking 2014. Anti GBM Ab +- initiated on Cyclophosphamide and pulse dose steroid 06/17/17. Subjective:     No events overnight.   No hemoptysis    Current Facility-Administered Medications   Medication Dose Route Frequency    dextroamphetamine-amphetamine (ADDERALL) tablet 30 mg  30 mg Oral TID    baclofen (LIORESAL) tablet 20 mg  20 mg Oral TID    DESVENLAFAXINE SUCCINATE (PRISTIQ PO) - patient supplied  100 mg Oral BID    doxazosin (CARDURA) tablet 4 mg  4 mg Oral DAILY    gabapentin (NEURONTIN) capsule 1,200 mg  1,200 mg Oral TID    lamoTRIgine (LaMICtal) tablet 100 mg  100 mg Oral DAILY    mirtazapine (REMERON) tablet 30 mg  30 mg Oral QHS    multivitamin, stress formula (STRESS TAB) tablet 1 Tab  1 Tab Oral DAILY    pantoprazole (PROTONIX) tablet 40 mg  40 mg Oral ACB    oxyCODONE IR (OXY-IR) immediate release tablet 30 mg  30 mg Oral Q4H PRN    diazePAM (VALIUM) tablet 10 mg  10 mg Oral Q4H PRN    sodium chloride (NS) flush 5-10 mL  5-10 mL IntraVENous Q8H    sodium chloride (NS) flush 5-10 mL  5-10 mL IntraVENous PRN    albuterol (PROVENTIL VENTOLIN) nebulizer solution 2.5 mg  2.5 mg Nebulization Q6H PRN    cefTRIAXone (ROCEPHIN) 1 g in 0.9% sodium chloride (MBP/ADV) 50 mL  1 g IntraVENous Q24H    azithromycin (ZITHROMAX) 500 mg in 0.9% sodium chloride (MBP/ADV) 250 mL  500 mg IntraVENous Q24H    acetaminophen (TYLENOL) tablet 650 mg  650 mg Oral Q4H PRN    morphine injection 1 mg  1 mg IntraVENous Q4H PRN    naloxone (NARCAN) injection 0.4 mg  0.4 mg IntraVENous PRN    ondansetron (ZOFRAN) injection 4 mg  4 mg IntraVENous Q4H PRN    senna-docusate (PERICOLACE) 8.6-50 mg per tablet 1 Tab  1 Tab Oral BID PRN    LORazepam (ATIVAN) injection 1 mg  1 mg IntraVENous Q6H PRN    enoxaparin (LOVENOX) injection 40 mg  40 mg SubCUTAneous Q24H    0.9% sodium chloride infusion  100 mL/hr IntraVENous CONTINUOUS       Review of Systems  Constitutional: negative for fever, chills, sweats  Cardiovascular: negative for chest pain, palpitations, syncope, edema  Gastrointestinal:  negative for dysphagia, reflux, vomiting, diarrhea, abdominal pain, or melena  Neurologic:  negative for focal weakness, numbness, headache    Objective:     Vitals:    06/16/17 2310 06/17/17 0249 06/17/17 0446 06/17/17 0704   BP: 97/58 113/80  111/71   Pulse: 62 66  63   Resp: 20 18  18   Temp: 98 °F (36.7 °C) 97.7 °F (36.5 °C)  97.7 °F (36.5 °C)   SpO2: 95% 98% 95% 99%   Weight:       Height:         Intake and Output:   06/15 1901 - 06/17 0700  In: 840 [P.O.:840]  Out: 750 [Urine:750]       Physical Exam:   Constitution:  the patient is well developed and in no acute distress  EENMT:  Sclera clear, pupils equal, oral mucosa moist  Respiratory: diminished, clear to auscultation bilaterally, O2 at 2 lpm  Cardiovascular:  RRR without M,G,R  Gastrointestinal: soft and non-tender; with positive bowel sounds. Musculoskeletal: warm without cyanosis. There is no lower leg edema.   Skin:  no jaundice or rashes, no open wounds   Neurologic: no gross neuro deficits     Psychiatric:  alert and oriented x 3    CHEST XRAY:   6/15 LE doppler  No evidence of left lower extremity deep venous thrombosis. 6/15                LAB   Recent Labs      06/15/17   1458   WBC  8.0   HGB  10.9*   HCT  33.8*   PLT  463*     Recent Labs      06/15/17   1458   NA  143   K  3.9   CL  104   CO2  30   GLU  93   BUN  6   CREA  1.00   CA  9.3   ALB  2.3*   TBILI  0.2   ALT  22   SGOT  22     No results for input(s): PH, PCO2, PO2, HCO3 in the last 72 hours. No results for input(s): LCAD, LAC in the last 72 hours.       Assessment:  (Medical Decision Making)     Patient Active Problem List   Diagnosis Code    Anxiety and depression F41.9, F32.9    History of hepatitis C Z86.19    Chronic shoulder pain M25.519, G89.29    HLD (hyperlipidemia) E78.5    BPH (benign prostatic hyperplasia) N40.0    Pulmonary cavitary lesion J98.4    Abnormal CXR R93.8    Elevated C-reactive protein (CRP) R79.82    Goodpasture syndrome (HCC) M31.0         Plan:  (Medical Decision Making)     Hospital Problems  Date Reviewed: 6/16/2017          Codes Class Noted POA    BPH (benign prostatic hyperplasia) (Chronic) ICD-10-CM: N40.0  ICD-9-CM: 600.00  6/16/2017 Yes        * (Principal)Pulmonary cavitary lesion ICD-10-CM: J98.4  ICD-9-CM: 518.89  6/15/2017 Yes    Steroids on hold with plans for navigational bronch / EBUS  abx  WBC / PCT WNL      Abnormal CXR ICD-10-CM: R93.8  ICD-9-CM: 793.2  6/15/2017 Yes    With cavitary lesions      Elevated C-reactive protein (CRP) ICD-10-CM: R79.82  ICD-9-CM: 790.95  6/15/2017 Yes        Anxiety and depression (Chronic) ICD-10-CM: F41.9, F32.9  ICD-9-CM: 300.00, 311  8/7/2012 Yes    Chronic      History of hepatitis C ICD-10-CM: Z86.19  ICD-9-CM: V12.09  8/7/2012 Yes     S/p treatment         Chronic shoulder pain (Chronic) ICD-10-CM: M25.519, G89.29  ICD-9-CM: 719.41, 338.29  8/7/2012 Yes        HLD (hyperlipidemia) (Chronic) ICD-10-CM: E78.5  ICD-9-CM: 272.4  2012 Yes         --Zithromax / Rocephin  -6-/15 BC: NGTD x 2  BC: negative x 2  --IVF at 100 ml/hr  --steroids on hold until after bronch  --planning bronch (+ navigational EBUS) - currently scheduled for 17  --Results for Briana Freeman (MRN [id number]) as of 2017 09:10   Ref. Range 2017 10:36   C-Reactive Protein, Qt Latest Ref Range: 0.0 - 4.9 mg/L 283.0 (H)     --ANCA,  --anti-GBM ab:  Glomerular Basement Membrane Ab 75 (H) 0 - 20 units Final      Comment:     (NOTE)                     Negative                   0 - 20                     Weak Positive             21 - 30                     Moderate to Strong Positive   >30   Performed At: [address] [id number]   Chela Hein MD       This is an unusual presentation for Goodpasteur's syndrome but that level of positivity cannot be ignored especially with such highly elevated CRP- will start pulse dose steroids and cyclophosphamide(2mh/kg/day) and engage nephrology he will likely need plasmapheresis as well. Discussed with both patient and Dr Martín Mancera who has been consulted, ANCA is pending(may have concomitant vasculitis ~40% incidence). Will obtain UA with microscopic to look for protein and RBC. More than 50% of the time documented was spent in face-to-face contact with the patient and in the care of the patient on the floor/unit where the patient is located.     Ish Arciniega MD

## 2017-06-17 NOTE — PROGRESS NOTES
Jerod Strange ,pharmacist arrives to floor to bring cytoxan po for this pt, nursing advises him that I spoke with Meme/pharmacist and she informed nursing that dose would be brought to floor at later time, nursing tries to change dosing time to 2000 and cannot complete task, Jerod Strange pharmacist aware, and removes med to return to pharmacy stating that nursing cannot change time, and that pharmacy will return med and dispense new dose, nursing will follow up with on coming shift  Since  unable to complete dosing at this time

## 2017-06-17 NOTE — PROGRESS NOTES
Cytoxan not available from pharmacy at this time, message sent and will dose when available for pharmacy

## 2017-06-17 NOTE — H&P
[facility]        Inpatient Hematology / Oncology Consult Note    Reason for Consult:  Lung mass [R91.8]  Referring Physician:  Lalo Alonso MD    History of Present Illness:  Mr. Jessica Pablo is a 61 y.o.  male admitted for work up of cavitary pulmonary lesion. Pt has a history of <1ppd x 10 years of smoking cessation 2014, depression, anxiety, ADD,Hepatitis C, BPH, and chronic pain. Pt has been feeling poorly for months and has lost 40lbs over the last 3 months secondary to anorexia. He has also had chills, shortness of breath, and edema. He had abd CT 4/21/17 which revealed RLL 4.4 cm x 3.1cm density. He had chest CT 5/8/17 which revealed multiple large infiltrative nodules in R lung concerning for cavitation. He was seen 6/8 by Dr. Alea Hodge and labs collected were reviewed. Pt was started on LVQ on 6/9. He was seen by Dr. Alea Hodge 6/15 and was not any better therefore he was admitted for work up of cavitary lesion. We have been consulted for possible need for pheresis due to new diagnosis Goodpasture syndrome. Review of Systems:  Constitutional Denies fever, chills, weight loss, appetite changes, fatigue, night sweats. HEENT Denies trauma, blurry vision, hearing loss, ear pain, nosebleeds, sore throat, neck pain     Skin Denies lesions or rashes. Lungs Denies dyspnea, cough, sputum production or hemoptysis. Cardiovascular Denies chest pain, palpitations, or lower extremity edema. Neuro Denies headaches, visual changes or ataxia. Denies dizziness, tingling, tremors, sensory change, speech change, focal weakness or headaches. Hematology Denies easy bruising or bleeding, denies gingival bleeding or epistaxis. MSK Denies back pain, arthralgias, myalgias or frequent falls. Psychiatric/Behavioral  The patient is not nervous/anxious.          No Known Allergies  Past Medical History:   Diagnosis Date    Anxiety and depression 8/7/2012    BPH (benign prostatic hyperplasia) 8/7/2012    Chronic shoulder pain 8/7/2012    Colon polyps     Hepatitis C     diagnosed 10/08    History of hepatitis C 8/7/2012    History of hepatitis C 8/7/2012    HLD (hyperlipidemia) 8/7/2012    Other ill-defined conditions     had abscess on lung; liver problems    Psychiatric disorder     depression; anxiety     Past Surgical History:   Procedure Laterality Date    HX ORTHOPAEDIC      shoulders x4    HX OTHER SURGICAL      abscess removed from bilateral lungs     Family History   Problem Relation Age of Onset    Lung Disease Father     Cancer Father      lung     Social History     Social History    Marital status:      Spouse name: N/A    Number of children: N/A    Years of education: N/A     Occupational History    Not on file.      Social History Main Topics    Smoking status: Former Smoker     Packs/day: 0.50     Years: 6.00     Quit date: 12/1/2014    Smokeless tobacco: Never Used      Comment: Quit 09/09 > 10pk Hx    Alcohol use No    Drug use: No    Sexual activity: Not Currently     Other Topics Concern    Not on file     Social History Narrative     Current Facility-Administered Medications   Medication Dose Route Frequency Provider Last Rate Last Dose    cyclophosphamide (CYTOXAN) chemo capsule 150 mg  150 mg Oral DAILY Ellis Miller MD   Stopped at 06/17/17 1100    methylPREDNISolone (PF) (SOLU-MEDROL) 1,000 mg in 0.9% sodium chloride 100 mL IVPB  1,000 mg IntraVENous DAILY Ellis Miller  mL/hr at 06/17/17 1408 1,000 mg at 06/17/17 1408    dextroamphetamine-amphetamine (ADDERALL) tablet 30 mg  30 mg Oral TID USHA Mendosa   30 mg at 06/17/17 0845    baclofen (LIORESAL) tablet 20 mg  20 mg Oral TID USHA Mendosa   20 mg at 06/17/17 0845    DESVENLAFAXINE SUCCINATE (PRISTIQ PO) - patient supplied  100 mg Oral BID USHA Mendosa   100 mg at 06/17/17 0900    doxazosin (CARDURA) tablet 4 mg  4 mg Oral DAILY Tashi Collins USHA Cui   4 mg at 06/17/17 0845    gabapentin (NEURONTIN) capsule 1,200 mg  1,200 mg Oral TID [place]ington Roughen, PA   1,200 mg at 06/17/17 0845    lamoTRIgine (LaMICtal) tablet 100 mg  100 mg Oral DAILY Lexington Roughen, PA   100 mg at 06/17/17 0846    mirtazapine (REMERON) tablet 30 mg  30 mg Oral QHS [place]ington Roughen, PA   30 mg at 06/16/17 2128    multivitamin, stress formula (STRESS TAB) tablet 1 Tab  1 Tab Oral DAILY Lexington Roughen, PA   1 Tab at 06/17/17 0845    pantoprazole (PROTONIX) tablet 40 mg  40 mg Oral ACB Mayra Roughen, PA   40 mg at 06/17/17 0606    oxyCODONE IR (OXY-IR) immediate release tablet 30 mg  30 mg Oral Q4H PRN Lexington Roughen, PA   30 mg at 06/17/17 1407    diazePAM (VALIUM) tablet 10 mg  10 mg Oral Q4H PRN Lexington Roughen, PA   10 mg at 06/16/17 1215    sodium chloride (NS) flush 5-10 mL  5-10 mL IntraVENous Q8H Mayra Roughen, PA   5 mL at 06/17/17 1416    sodium chloride (NS) flush 5-10 mL  5-10 mL IntraVENous PRN Lexington Roughen, PA        albuterol (PROVENTIL VENTOLIN) nebulizer solution 2.5 mg  2.5 mg Nebulization Q6H PRN Lexington Roughen, PA        cefTRIAXone (ROCEPHIN) 1 g in 0.9% sodium chloride (MBP/ADV) 50 mL  1 g IntraVENous Q24H Mayra Roughen,  mL/hr at 06/17/17 0900 1 g at 06/17/17 0900    azithromycin (ZITHROMAX) 500 mg in 0.9% sodium chloride (MBP/ADV) 250 mL  500 mg IntraVENous Q24H Mayra Roughen,  mL/hr at 06/16/17 2354 500 mg at 06/16/17 2354    acetaminophen (TYLENOL) tablet 650 mg  650 mg Oral Q4H PRN Mayra Roughen, PA        morphine injection 1 mg  1 mg IntraVENous Q4H PRN Mayra Roughen, PA        naloxone [facility]) injection 0.4 mg  0.4 mg IntraVENous PRN USHA Ricks        ondansetron [facility]) injection 4 mg  4 mg IntraVENous Q4H PRN USHA Ricks        senna-docusate (PERICOLACE) 8.6-50 mg per tablet 1 Tab  1 Tab Oral BID PRN USHA Ricks       Jewell Amen LORazepam (ATIVAN) injection 1 mg  1 mg IntraVENous Q6H PRN USHA Stone        enoxaparin (LOVENOX) injection 40 mg  40 mg SubCUTAneous Q24H USHA Stone   40 mg at 17 1707    0.9% sodium chloride infusion  100 mL/hr IntraVENous CONTINUOUS Niko Dunaway  mL/hr at 17 0607 100 mL/hr at 17 0607       OBJECTIVE:  Patient Vitals for the past 8 hrs:   BP Temp Pulse Resp SpO2   17 1510 104/75 97.6 °F (36.4 °C) 73 18 94 %   17 1115 92/58 97.6 °F (36.4 °C) 64 18 97 %     Temp (24hrs), Av.7 °F (36.5 °C), Min:97.6 °F (36.4 °C), Max:98 °F (36.7 °C)     0701 -  1900  In: 600 [P.O.:600]  Out: 700 [Urine:700]    Physical Exam:  Constitutional: Well developed, well nourished male in no acute distress, sitting comfortably in the hospital bed. HEENT: Normocephalic and atraumatic. Oropharynx is clear, mucous membranes are moist. Extraocular muscles are intact. Sclerae anicteric. Skin Warm and dry. No bruising and no rash noted. No erythema. No pallor. Respiratory Lungs are clear to auscultation bilaterally without wheezes, rales or rhonchi, normal air exchange without accessory muscle use. CVS Normal rate, regular rhythm and normal S1 and S2. No murmurs, gallops, or rubs. Abdomen Soft, nontender and nondistended, normoactive bowel sounds. Neuro Grossly nonfocal with no obvious sensory or motor deficits. MSK Normal range of motion in general.  No edema and no tenderness. Psych Appropriate mood and affect. Labs:    No results found for this or any previous visit (from the past 24 hour(s)). Imaging:   Final result (Exam End: 6/15/2017  4:26 PM) Open        Study Result      HISTORY: Lower extremities pain and swelling. Pulmonary infiltrates.     EXAM: Contiguous axial CT images were obtained from the thoracic inlet to the  upper abdomen following the uneventful IV administration of 100 mL Isovue-370.   Coronal reformations were performed.     Radiation dose reduction techniques were used for this study. All CT scans  performed at this facility use one or all of the following: Automated exposure  control, adjustment of the mA and/or kVp according to patient's size, and  iterative reconstruction.     PRIOR STUDY: 05/08/2017     FINDINGS: There is no evidence of pulmonary embolus through the subsegmental  level bilaterally. The main pulmonary artery is normal in size. There are  multiple mildly prominent mediastinal lymph nodes with a representative right  paratracheal lymph node measuring 18 x 10 mm. There are nonenlarged hilar lymph  nodes bilaterally. There is no pericardial effusion.     There has been progressive consolidation and volume loss in the right upper lobe  with areas of air bronchograms as well as multiple cavitary foci. The  consolidative and cavitary process in the right lower lobe has also mildly  enlarged, now measuring 5.6 x 2.5 cm compared with 4.2 x 2.5 cm previously. There is a new 6 mm irregular, nodular density (image 72) located just anterior  and inferior to this larger process. There are minor groundglass opacities in  the left lower lobe, new from the prior exam. There is a background of  centrilobular emphysema. There is no pleural effusion. Several tiny nodular  densities in the right lung base (images 93-96) have developed since the prior  exam.     There are subcentimeter hypodensities in the left hepatic lobe. Several  calcified gallstones are present in the gallbladder. The remaining included  upper abdominal organs are unremarkable. There are no acute or concerning bony  lesions.     IMPRESSION  IMPRESSION:      1. Progressive consolidation and volume loss in the right upper lobe with new  areas of cavitation. The consolidative cavitary process in the right lower lobe  has also mildly enlarged, and several new irregular and groundglass nodules have  developed bilaterally.  Findings are suggestive of an infectious process,  possibly fungal.     2. No evidence of pulmonary embolus.     3. Mildly prominent mediastinal lymph nodes which are likely reactive.     4. Cholelithiasis. ASSESSMENT:  Problem List  Date Reviewed: 6/16/2017          Codes Class Noted    Goodpasture syndrome [facility]) ICD-10-CM: M31.0  ICD-9-CM: 446.21  6/17/2017        BPH (benign prostatic hyperplasia) (Chronic) ICD-10-CM: N40.0  ICD-9-CM: 600.00  6/16/2017        * (Principal)Pulmonary cavitary lesion ICD-10-CM: J98.4  ICD-9-CM: 518.89  6/15/2017        Abnormal CXR ICD-10-CM: R93.8  ICD-9-CM: 793.2  6/15/2017        Elevated C-reactive protein (CRP) ICD-10-CM: R79.82  ICD-9-CM: 790.95  6/15/2017        Anxiety and depression (Chronic) ICD-10-CM: F41.9, F32.9  ICD-9-CM: 300.00, 311  8/7/2012        History of hepatitis C ICD-10-CM: Z86.19  ICD-9-CM: V12.09  8/7/2012    Overview Signed 8/7/2012 10:55 AM by Chris Tyson III     S/p treatment             Chronic shoulder pain (Chronic) ICD-10-CM: M25.519, G89.29  ICD-9-CM: 719.41, 338.29  8/7/2012        HLD (hyperlipidemia) (Chronic) ICD-10-CM: E78.5  ICD-9-CM: 272.4  8/7/2012                RECOMMENDATIONS:  Goodpasture syndrome  New cavitary lesion with positive serology for GBM antibody started on steroids and cyclophosphamide. Nephrology also following and per Dr. Martín Mancera GFR is preserved and not planning on renal biopsy. Nephrology arranging for temp cath placement. Planning to pheresis tomorrow. Lab studies and imaging studies were personally reviewed. Pertinent old records were reviewed. Thank you for allowing us to participate in the care of Mr. Keen.          Essie Rosado NP   [facility]  [id number] [place] [address]  Office : [phone]  Fax : [phone]

## 2017-06-17 NOTE — PROGRESS NOTES
Oxycodone 30mgs po at this time for co generalized pain 7/10, pt has been very drowsy most of the day, will monitor

## 2017-06-18 LAB
ANION GAP BLD CALC-SCNC: 11 MMOL/L (ref 7–16)
BASOPHILS # BLD AUTO: 0 K/UL (ref 0–0.2)
BASOPHILS # BLD: 0 % (ref 0–2)
BUN SERPL-MCNC: 9 MG/DL (ref 6–23)
CALCIUM SERPL-MCNC: 9.1 MG/DL (ref 8.3–10.4)
CHLORIDE SERPL-SCNC: 110 MMOL/L (ref 98–107)
CK SERPL-CCNC: 39 U/L (ref 21–215)
CO2 SERPL-SCNC: 25 MMOL/L (ref 21–32)
CREAT SERPL-MCNC: 0.82 MG/DL (ref 0.8–1.5)
DIFFERENTIAL METHOD BLD: ABNORMAL
EOSINOPHIL # BLD: 0 K/UL (ref 0–0.8)
EOSINOPHIL NFR BLD: 0 % (ref 0.5–7.8)
ERYTHROCYTE [DISTWIDTH] IN BLOOD BY AUTOMATED COUNT: 16.8 % (ref 11.9–14.6)
GLUCOSE SERPL-MCNC: 152 MG/DL (ref 65–100)
HCT VFR BLD AUTO: 34.1 % (ref 41.1–50.3)
HGB BLD-MCNC: 10.6 G/DL (ref 13.6–17.2)
IMM GRANULOCYTES # BLD: 0 K/UL (ref 0–0.5)
IMM GRANULOCYTES NFR BLD AUTO: 0.5 % (ref 0–5)
LYMPHOCYTES # BLD AUTO: 17 % (ref 13–44)
LYMPHOCYTES # BLD: 1.1 K/UL (ref 0.5–4.6)
MCH RBC QN AUTO: 25.2 PG (ref 26.1–32.9)
MCHC RBC AUTO-ENTMCNC: 31.1 G/DL (ref 31.4–35)
MCV RBC AUTO: 81.2 FL (ref 79.6–97.8)
MONOCYTES # BLD: 0.1 K/UL (ref 0.1–1.3)
MONOCYTES NFR BLD AUTO: 1 % (ref 4–12)
NEUTS SEG # BLD: 5.3 K/UL (ref 1.7–8.2)
NEUTS SEG NFR BLD AUTO: 82 % (ref 43–78)
PLATELET # BLD AUTO: 450 K/UL (ref 150–450)
PMV BLD AUTO: 8.6 FL (ref 10.8–14.1)
POTASSIUM SERPL-SCNC: 3.7 MMOL/L (ref 3.5–5.1)
RBC # BLD AUTO: 4.2 M/UL (ref 4.23–5.67)
SODIUM SERPL-SCNC: 146 MMOL/L (ref 136–145)
WBC # BLD AUTO: 6.4 K/UL (ref 4.3–11.1)

## 2017-06-18 PROCEDURE — 74011250636 HC RX REV CODE- 250/636: Performed by: INTERNAL MEDICINE

## 2017-06-18 PROCEDURE — 74011250636 HC RX REV CODE- 250/636: Performed by: PHYSICIAN ASSISTANT

## 2017-06-18 PROCEDURE — 80048 BASIC METABOLIC PNL TOTAL CA: CPT | Performed by: INTERNAL MEDICINE

## 2017-06-18 PROCEDURE — 74011250637 HC RX REV CODE- 250/637: Performed by: PHYSICIAN ASSISTANT

## 2017-06-18 PROCEDURE — 85025 COMPLETE CBC W/AUTO DIFF WBC: CPT | Performed by: INTERNAL MEDICINE

## 2017-06-18 PROCEDURE — 36415 COLL VENOUS BLD VENIPUNCTURE: CPT | Performed by: INTERNAL MEDICINE

## 2017-06-18 PROCEDURE — 74011000258 HC RX REV CODE- 258: Performed by: PHYSICIAN ASSISTANT

## 2017-06-18 PROCEDURE — 82550 ASSAY OF CK (CPK): CPT | Performed by: INTERNAL MEDICINE

## 2017-06-18 PROCEDURE — 99232 SBSQ HOSP IP/OBS MODERATE 35: CPT | Performed by: INTERNAL MEDICINE

## 2017-06-18 PROCEDURE — 65270000029 HC RM PRIVATE

## 2017-06-18 PROCEDURE — 74011000258 HC RX REV CODE- 258: Performed by: INTERNAL MEDICINE

## 2017-06-18 RX ADMIN — DIAZEPAM 10 MG: 5 TABLET ORAL at 13:24

## 2017-06-18 RX ADMIN — SODIUM CHLORIDE 100 ML/HR: 900 INJECTION, SOLUTION INTRAVENOUS at 03:51

## 2017-06-18 RX ADMIN — Medication 5 ML: at 05:53

## 2017-06-18 RX ADMIN — SODIUM CHLORIDE 100 ML/HR: 900 INJECTION, SOLUTION INTRAVENOUS at 15:29

## 2017-06-18 RX ADMIN — GABAPENTIN 1200 MG: 400 CAPSULE ORAL at 09:24

## 2017-06-18 RX ADMIN — CEFTRIAXONE 1 G: 1 INJECTION, POWDER, FOR SOLUTION INTRAMUSCULAR; INTRAVENOUS at 15:20

## 2017-06-18 RX ADMIN — Medication 5 ML: at 14:00

## 2017-06-18 RX ADMIN — DEXTROAMPHETAMINE SACCHARATE, AMPHETAMINE ASPARTATE, DEXTROAMPHETAMINE SULFATE AND AMPHETAMINE SULFATE 30 MG: 3.75; 3.75; 3.75; 3.75 TABLET ORAL at 21:37

## 2017-06-18 RX ADMIN — OXYCODONE HYDROCHLORIDE 30 MG: 15 TABLET ORAL at 13:18

## 2017-06-18 RX ADMIN — DEXTROAMPHETAMINE SACCHARATE, AMPHETAMINE ASPARTATE, DEXTROAMPHETAMINE SULFATE AND AMPHETAMINE SULFATE 30 MG: 3.75; 3.75; 3.75; 3.75 TABLET ORAL at 15:20

## 2017-06-18 RX ADMIN — DESVENLAFAXINE 100 MG: 100 TABLET, EXTENDED RELEASE ORAL at 09:00

## 2017-06-18 RX ADMIN — BACLOFEN 20 MG: 10 TABLET ORAL at 21:37

## 2017-06-18 RX ADMIN — OXYCODONE HYDROCHLORIDE 30 MG: 15 TABLET ORAL at 09:24

## 2017-06-18 RX ADMIN — OXYCODONE HYDROCHLORIDE 30 MG: 15 TABLET ORAL at 03:44

## 2017-06-18 RX ADMIN — SODIUM CHLORIDE 1000 MG: 900 INJECTION, SOLUTION INTRAVENOUS at 10:49

## 2017-06-18 RX ADMIN — ENOXAPARIN SODIUM 40 MG: 40 INJECTION SUBCUTANEOUS at 15:20

## 2017-06-18 RX ADMIN — GABAPENTIN 1200 MG: 400 CAPSULE ORAL at 21:37

## 2017-06-18 RX ADMIN — CYCLOPHOSPHAMIDE 150 MG: 50 CAPSULE ORAL at 21:38

## 2017-06-18 RX ADMIN — OXYCODONE HYDROCHLORIDE 30 MG: 15 TABLET ORAL at 18:07

## 2017-06-18 RX ADMIN — DESVENLAFAXINE 100 MG: 100 TABLET, EXTENDED RELEASE ORAL at 18:00

## 2017-06-18 RX ADMIN — LAMOTRIGINE 100 MG: 100 TABLET ORAL at 09:25

## 2017-06-18 RX ADMIN — BACLOFEN 20 MG: 10 TABLET ORAL at 15:20

## 2017-06-18 RX ADMIN — B-COMPLEX W/ C & FOLIC ACID TAB 1 TABLET: TAB at 09:25

## 2017-06-18 RX ADMIN — MIRTAZAPINE 30 MG: 15 TABLET, FILM COATED ORAL at 21:38

## 2017-06-18 RX ADMIN — DEXTROAMPHETAMINE SACCHARATE, AMPHETAMINE ASPARTATE, DEXTROAMPHETAMINE SULFATE AND AMPHETAMINE SULFATE 30 MG: 3.75; 3.75; 3.75; 3.75 TABLET ORAL at 09:24

## 2017-06-18 RX ADMIN — GABAPENTIN 1200 MG: 400 CAPSULE ORAL at 15:20

## 2017-06-18 RX ADMIN — DOXAZOSIN 4 MG: 4 TABLET ORAL at 09:25

## 2017-06-18 RX ADMIN — AZITHROMYCIN MONOHYDRATE 500 MG: 500 INJECTION, POWDER, LYOPHILIZED, FOR SOLUTION INTRAVENOUS at 15:19

## 2017-06-18 RX ADMIN — Medication 5 ML: at 21:41

## 2017-06-18 RX ADMIN — PANTOPRAZOLE SODIUM 40 MG: 40 TABLET, DELAYED RELEASE ORAL at 05:53

## 2017-06-18 RX ADMIN — BACLOFEN 20 MG: 10 TABLET ORAL at 09:25

## 2017-06-18 NOTE — PROGRESS NOTES
Admit Date: 6/15/2017    Subjective:     Appetite much better (on steroids)    Medications:  Current Facility-Administered Medications   Medication Dose Route Frequency    methylPREDNISolone (PF) (SOLU-MEDROL) 1,000 mg in 0.9% sodium chloride 100 mL IVPB  1,000 mg IntraVENous DAILY    cyclophosphamide (CYTOXAN) chemo capsule 150 mg  150 mg Oral Q24H    dextroamphetamine-amphetamine (ADDERALL) tablet 30 mg  30 mg Oral TID    baclofen (LIORESAL) tablet 20 mg  20 mg Oral TID    DESVENLAFAXINE SUCCINATE (PRISTIQ PO) - patient supplied  100 mg Oral BID    doxazosin (CARDURA) tablet 4 mg  4 mg Oral DAILY    gabapentin (NEURONTIN) capsule 1,200 mg  1,200 mg Oral TID    lamoTRIgine (LaMICtal) tablet 100 mg  100 mg Oral DAILY    mirtazapine (REMERON) tablet 30 mg  30 mg Oral QHS    multivitamin, stress formula (STRESS TAB) tablet 1 Tab  1 Tab Oral DAILY    pantoprazole (PROTONIX) tablet 40 mg  40 mg Oral ACB    oxyCODONE IR (OXY-IR) immediate release tablet 30 mg  30 mg Oral Q4H PRN    diazePAM (VALIUM) tablet 10 mg  10 mg Oral Q4H PRN    sodium chloride (NS) flush 5-10 mL  5-10 mL IntraVENous Q8H    sodium chloride (NS) flush 5-10 mL  5-10 mL IntraVENous PRN    albuterol (PROVENTIL VENTOLIN) nebulizer solution 2.5 mg  2.5 mg Nebulization Q6H PRN    cefTRIAXone (ROCEPHIN) 1 g in 0.9% sodium chloride (MBP/ADV) 50 mL  1 g IntraVENous Q24H    azithromycin (ZITHROMAX) 500 mg in 0.9% sodium chloride (MBP/ADV) 250 mL  500 mg IntraVENous Q24H    acetaminophen (TYLENOL) tablet 650 mg  650 mg Oral Q4H PRN    morphine injection 1 mg  1 mg IntraVENous Q4H PRN    naloxone (NARCAN) injection 0.4 mg  0.4 mg IntraVENous PRN    ondansetron (ZOFRAN) injection 4 mg  4 mg IntraVENous Q4H PRN    senna-docusate (PERICOLACE) 8.6-50 mg per tablet 1 Tab  1 Tab Oral BID PRN    LORazepam (ATIVAN) injection 1 mg  1 mg IntraVENous Q6H PRN    enoxaparin (LOVENOX) injection 40 mg  40 mg SubCUTAneous Q24H    0.9% sodium chloride infusion  100 mL/hr IntraVENous CONTINUOUS       Review of Systems  No chest pain, no dyspnea, no palpitations  No productive cough  No headaches  No emesis, no diarrhea      Objective:     Vitals:  Vitals:    17 2034 17 2300 17 0200 17 0700   BP:  117/87 128/68 93/58   Pulse:  92 60 (!) 57   Resp:  18 18 20   Temp:  97.8 °F (36.6 °C) 97.6 °F (36.4 °C) 97.7 °F (36.5 °C)   SpO2: 97% 93% 93% 97%   Weight:       Height:         Temp (24hrs), Av.7 °F (36.5 °C), Min:97.6 °F (36.4 °C), Max:97.8 °F (36.6 °C)      Weight: Weight: 69.9 kg (154 lb)    Intake / Output:   Intake/Output Summary (Last 24 hours) at 17 0946  Last data filed at 17 2312   Gross per 24 hour   Intake              960 ml   Output              700 ml   Net              260 ml   all 0.7L output recorded is urine (however 2nd shift not recorded)  Exam:  Alert, communicative, no distress  Min basilar crackles  S1, s2, no rubs  Abdomen soft, nontender  Trace L leg edema; mild L calf tenderness     Lab Data:  Recent Labs      06/15/17   1458   WBC  8.0   HGB  10.9*   HCT  33.8*   PLT  463*     No lab exists for component: FEPP, TIBCPP  Recent Labs      17   0505  06/15/17   1458   NA  146*  143   K  3.7  3.9   CL  110*  104   CO2  25  30   GLU  152*  93   BUN  9  6   CREA  0.82  1.00   CA  9.1  9.3     No results for input(s): PHOS, MG in the last 72 hours. No results found for: O2FLOW, FIO2, PH, PCO2, PO2, HCO3, BD, O2ST    Assessment:     New cavitary lesion with positive serology for GBM antibody  - started on steroids and cyclophosphamide 17  -GFR preserved; most recent UA w/o blood or protein. Will not plan on renal biopsy for now but will continue following.  -heme plans plasmapheresis     Borderline hypernatremia    L calf tenderness  -mild but definitely different from the R side. Venous duplex negative.  Will add-on a CPK

## 2017-06-18 NOTE — PROGRESS NOTES
Samm Mitchell Adcox  Admission Date: 6/15/2017             Daily Progress Note: 6/18/2017    The patient's chart is reviewed and the patient is discussed with the staff. 60 yo male with a history of depression, anxiety, ADD,Hepatitis C, BPH, and chronic pain. He has been feeling poorly for months and has lost 40lbs over the last 3 months secondary to anorexia. He also had chills, shortness of breath, and edema. CT chest 4/21/17 revealed RLL 4.4 cm x 3.1cm density. He had chest CT 5/8/17 which revealed multiple large infiltrative nodules in R lung concerning for cavitation. He was seen 6/8 by Dr. Genesis Mcmahan, labs collected were reviewed and pt was started on LVQ on 6/9. He was seen by Dr. Genesis Mcmahan the morning of admission, was not any better, was hypoxic on RA, and was referred to the hospital for admission / ongoing workup of cavitary lesion. He has a history of smoking <1 ppd x 10 years off and on. He quit smoking 2014. CRP elevated at 283. Anti GBM Ab +- initiated on Cyclophosphamide and pulse dose steroid 06/17/17. Nephrology consulted with no plans for renal bx at this time (normal renal function / no blood or microscopic protein in urine). Also seen by heme/Onc with plans for plasma pheresis. Subjective:      Currently on RA with o2 sat 97%. Denies cough, hemoptysis or mucus production.   Heaviness in chest.  Denies n/v.      Current Facility-Administered Medications   Medication Dose Route Frequency    methylPREDNISolone (PF) (SOLU-MEDROL) 1,000 mg in 0.9% sodium chloride 100 mL IVPB  1,000 mg IntraVENous DAILY    cyclophosphamide (CYTOXAN) chemo capsule 150 mg  150 mg Oral Q24H    dextroamphetamine-amphetamine (ADDERALL) tablet 30 mg  30 mg Oral TID    baclofen (LIORESAL) tablet 20 mg  20 mg Oral TID    DESVENLAFAXINE SUCCINATE (PRISTIQ PO) - patient supplied  100 mg Oral BID    doxazosin (CARDURA) tablet 4 mg  4 mg Oral DAILY    gabapentin (NEURONTIN) capsule 1,200 mg  1,200 mg Oral TID    lamoTRIgine (LaMICtal) tablet 100 mg  100 mg Oral DAILY    mirtazapine (REMERON) tablet 30 mg  30 mg Oral QHS    multivitamin, stress formula (STRESS TAB) tablet 1 Tab  1 Tab Oral DAILY    pantoprazole (PROTONIX) tablet 40 mg  40 mg Oral ACB    oxyCODONE IR (OXY-IR) immediate release tablet 30 mg  30 mg Oral Q4H PRN    diazePAM (VALIUM) tablet 10 mg  10 mg Oral Q4H PRN    sodium chloride (NS) flush 5-10 mL  5-10 mL IntraVENous Q8H    sodium chloride (NS) flush 5-10 mL  5-10 mL IntraVENous PRN    albuterol (PROVENTIL VENTOLIN) nebulizer solution 2.5 mg  2.5 mg Nebulization Q6H PRN    cefTRIAXone (ROCEPHIN) 1 g in 0.9% sodium chloride (MBP/ADV) 50 mL  1 g IntraVENous Q24H    azithromycin (ZITHROMAX) 500 mg in 0.9% sodium chloride (MBP/ADV) 250 mL  500 mg IntraVENous Q24H    acetaminophen (TYLENOL) tablet 650 mg  650 mg Oral Q4H PRN    morphine injection 1 mg  1 mg IntraVENous Q4H PRN    naloxone (NARCAN) injection 0.4 mg  0.4 mg IntraVENous PRN    ondansetron (ZOFRAN) injection 4 mg  4 mg IntraVENous Q4H PRN    senna-docusate (PERICOLACE) 8.6-50 mg per tablet 1 Tab  1 Tab Oral BID PRN    LORazepam (ATIVAN) injection 1 mg  1 mg IntraVENous Q6H PRN    enoxaparin (LOVENOX) injection 40 mg  40 mg SubCUTAneous Q24H    0.9% sodium chloride infusion  100 mL/hr IntraVENous CONTINUOUS       Review of Systems  Constitutional: negative for fever, chills, sweats  Cardiovascular: negative for chest pain, palpitations, syncope, edema  Gastrointestinal:  negative for dysphagia, reflux, vomiting, diarrhea, abdominal pain, or melena  Neurologic:  negative for focal weakness, numbness, headache    Objective:     Vitals:    06/17/17 2300 06/18/17 0200 06/18/17 0700 06/18/17 1143   BP: 117/87 128/68 93/58 96/71   Pulse: 92 60 (!) 57 64   Resp: 18 18 20 18   Temp: 97.8 °F (36.6 °C) 97.6 °F (36.4 °C) 97.7 °F (36.5 °C) 97.7 °F (36.5 °C)   SpO2: 93% 93% 97% 97%   Weight:       Height:         Intake and Output: 06/16 1901 - 06/18 0700  In: 1320 [P.O.:1320]  Out: 700 [Urine:700]       Physical Exam:   Constitution:  the patient is well developed and in no acute distress  EENMT:  Sclera clear, pupils equal, oral mucosa moist  Respiratory: diminished, clear to auscultation bilaterally, RA  Cardiovascular:  RRR without M,G,R  Gastrointestinal: soft and non-tender; with positive bowel sounds. Musculoskeletal: warm without cyanosis. There is no lower leg edema. Skin:  no jaundice or rashes, no open wounds   Neurologic: no gross neuro deficits     Psychiatric:  alert and oriented x 3    CHEST XRAY:   6/15 LE doppler  No evidence of left lower extremity deep venous thrombosis. 6/15                LAB   Recent Labs      06/18/17   0505  06/15/17   1458   WBC  6.4  8.0   HGB  10.6*  10.9*   HCT  34.1*  33.8*   PLT  450  463*     Recent Labs      06/18/17   0505  06/15/17   1458   NA  146*  143   K  3.7  3.9   CL  110*  104   CO2  25  30   GLU  152*  93   BUN  9  6   CREA  0.82  1.00   CA  9.1  9.3   ALB   --   2.3*   TBILI   --   0.2   ALT   --   22   SGOT   --   22     No results for input(s): PH, PCO2, PO2, HCO3 in the last 72 hours. No results for input(s): LCAD, LAC in the last 72 hours.       Assessment:  (Medical Decision Making)     Patient Active Problem List   Diagnosis Code    Anxiety and depression F41.9, F32.9    History of hepatitis C Z86.19    Chronic shoulder pain M25.519, G89.29    HLD (hyperlipidemia) E78.5    BPH (benign prostatic hyperplasia) N40.0    Pulmonary cavitary lesion J98.4    Abnormal CXR R93.8    Elevated C-reactive protein (CRP) R79.82    Goodpasture syndrome (HCC) M31.0         Plan:  (Medical Decision Making)     Hospital Problems  Date Reviewed: 6/16/2017          Codes Class Noted POA    BPH (benign prostatic hyperplasia) (Chronic) ICD-10-CM: N40.0  ICD-9-CM: 600.00  6/16/2017 Yes        * (Principal)Pulmonary cavitary lesion ICD-10-CM: J98.4  ICD-9-CM: 518.89  6/15/2017 Yes Liz Clas / EBUS scheduled for 6/20  abx - WBC / PCT WNL  CRP and anti-GBM ab elevated      Abnormal CXR ICD-10-CM: R93.8  ICD-9-CM: 793.2  6/15/2017 Yes    With cavitary lesions      Elevated C-reactive protein (CRP) ICD-10-CM: R79.82  ICD-9-CM: 790.95  6/15/2017 Yes        Anxiety and depression (Chronic) ICD-10-CM: F41.9, F32.9  ICD-9-CM: 300.00, 311  8/7/2012 Yes    Chronic      History of hepatitis C ICD-10-CM: Z86.19  ICD-9-CM: V12.09  8/7/2012 Yes     S/p treatment         Chronic shoulder pain (Chronic) ICD-10-CM: M25.519, G89.29  ICD-9-CM: 719.41, 338.29  8/7/2012 Yes        HLD (hyperlipidemia) (Chronic) ICD-10-CM: G51.5  ICD-9-CM: 272.4  8/7/2012 Yes         --Results for Gabby Franklin (MRN [id number]) as of 6/17/2017 09:10   Ref. Range 6/8/2017 10:36   C-Reactive Protein, Qt Latest Ref Range: 0.0 - 4.9 mg/L 283.0 (H)     --ANCA,  --anti-GBM ab:  Glomerular Basement Membrane Ab 75 (H) 0 - 20 units Final      Comment:     (NOTE)                     Negative                   0 - 20                     Weak Positive             21 - 30                     Moderate to Strong Positive   >30   Performed At: [address] [id number]   Keri Felipe MD [id number]        --Zithromax / Rocephin - plan to continue abx therapy while on pulse dose steroids   6/15 BC: NGTD x 2 / 6/9 BC: negative x 2   PCT <0.1   WBC 6.4  --IVF at 100 ml/hr  --planning bronch (+ navigational EBUS) - currently scheduled for 6/20/17  --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 UA without microscopic protein or RBC.    Heme/Onc consulted for possible plasmapheresis - plan to initiate tomorrow      More than 50% of the time documented was spent in face-to-face contact with the patient and in the care of the patient on the floor/unit where the patient is located. Alvin Olivier, YUDELKA     Lungs:  Few trace rhonchi  Heart:  RRR with no Murmur/Rubs/Gallops    Additional Comments:  Pt vaping in room using brother's device. Encouraged to stop. Feeling better and now on RA. Brandan bronch for 6/20. Heme/Onc planning plasmapheresis for Anti-GBM Ab. I have spoken with and examined the patient. I agree with the above assessment and plan as documented.     Maribel Andre., MD

## 2017-06-18 NOTE — PROGRESS NOTES
BSSR received from Christopher Roldan. Ptis AAOx4, no acute distress, c/o mild pain on his left lower leg. Started on Cytoxan as ordered. NS at 100 ml/hr infusing via Left arm heplock intact and patent. Call light within reach.

## 2017-06-19 PROBLEM — M25.519 CHRONIC SHOULDER PAIN: Chronic | Status: ACTIVE | Noted: 2017-06-15

## 2017-06-19 PROBLEM — Z86.19 HISTORY OF HEPATITIS C: Chronic | Status: ACTIVE | Noted: 2017-06-15

## 2017-06-19 PROBLEM — G89.29 CHRONIC SHOULDER PAIN: Chronic | Status: ACTIVE | Noted: 2017-06-15

## 2017-06-19 PROBLEM — F41.9 ANXIETY AND DEPRESSION: Chronic | Status: ACTIVE | Noted: 2017-06-15

## 2017-06-19 PROBLEM — F32.A ANXIETY AND DEPRESSION: Chronic | Status: ACTIVE | Noted: 2017-06-15

## 2017-06-19 PROBLEM — E78.5 HLD (HYPERLIPIDEMIA): Chronic | Status: ACTIVE | Noted: 2017-06-15

## 2017-06-19 LAB
ANION GAP BLD CALC-SCNC: 8 MMOL/L (ref 7–16)
APTT PPP: 28.2 SEC (ref 23.5–31.7)
BASOPHILS # BLD AUTO: 0 K/UL (ref 0–0.2)
BASOPHILS # BLD: 0 % (ref 0–2)
BUN SERPL-MCNC: 15 MG/DL (ref 6–23)
CALCIUM SERPL-MCNC: 9.1 MG/DL (ref 8.3–10.4)
CHLORIDE SERPL-SCNC: 111 MMOL/L (ref 98–107)
CO2 SERPL-SCNC: 27 MMOL/L (ref 21–32)
CREAT SERPL-MCNC: 0.79 MG/DL (ref 0.8–1.5)
DIFFERENTIAL METHOD BLD: ABNORMAL
EOSINOPHIL # BLD: 0 K/UL (ref 0–0.8)
EOSINOPHIL NFR BLD: 0 % (ref 0.5–7.8)
ERYTHROCYTE [DISTWIDTH] IN BLOOD BY AUTOMATED COUNT: 17.5 % (ref 11.9–14.6)
FIBRINOGEN PPP-MCNC: 376 MG/DL (ref 172–437)
GLUCOSE SERPL-MCNC: 80 MG/DL (ref 65–100)
HCT VFR BLD AUTO: 34.1 % (ref 41.1–50.3)
HGB BLD-MCNC: 10.8 G/DL (ref 13.6–17.2)
IMM GRANULOCYTES # BLD: 0 K/UL (ref 0–0.5)
IMM GRANULOCYTES NFR BLD AUTO: 0 % (ref 0–5)
INR PPP: 1.2 (ref 0.9–1.2)
LYMPHOCYTES # BLD AUTO: 18 % (ref 13–44)
LYMPHOCYTES # BLD: 2 K/UL (ref 0.5–4.6)
MAGNESIUM SERPL-MCNC: 2.2 MG/DL (ref 1.8–2.4)
MCH RBC QN AUTO: 25.8 PG (ref 26.1–32.9)
MCHC RBC AUTO-ENTMCNC: 31.7 G/DL (ref 31.4–35)
MCV RBC AUTO: 81.6 FL (ref 79.6–97.8)
MONOCYTES # BLD: 0.1 K/UL (ref 0.1–1.3)
MONOCYTES NFR BLD AUTO: 1 % (ref 4–12)
NEUTS SEG # BLD: 8.8 K/UL (ref 1.7–8.2)
NEUTS SEG NFR BLD AUTO: 81 % (ref 43–78)
PLATELET # BLD AUTO: 392 K/UL (ref 150–450)
PLATELET COMMENTS,PCOM: ADEQUATE
PMV BLD AUTO: 8.7 FL (ref 10.8–14.1)
POTASSIUM SERPL-SCNC: 4.1 MMOL/L (ref 3.5–5.1)
PROTHROMBIN TIME: 13.3 SEC (ref 9.6–12)
RBC # BLD AUTO: 4.18 M/UL (ref 4.23–5.67)
RBC MORPH BLD: ABNORMAL
RBC MORPH BLD: ABNORMAL
SODIUM SERPL-SCNC: 146 MMOL/L (ref 136–145)
WBC # BLD AUTO: 10.9 K/UL (ref 4.3–11.1)
WBC MORPH BLD: ABNORMAL

## 2017-06-19 PROCEDURE — 74011250636 HC RX REV CODE- 250/636: Performed by: INTERNAL MEDICINE

## 2017-06-19 PROCEDURE — 99232 SBSQ HOSP IP/OBS MODERATE 35: CPT | Performed by: INTERNAL MEDICINE

## 2017-06-19 PROCEDURE — 80048 BASIC METABOLIC PNL TOTAL CA: CPT | Performed by: INTERNAL MEDICINE

## 2017-06-19 PROCEDURE — 74011250636 HC RX REV CODE- 250/636: Performed by: PHYSICIAN ASSISTANT

## 2017-06-19 PROCEDURE — 74011000258 HC RX REV CODE- 258: Performed by: INTERNAL MEDICINE

## 2017-06-19 PROCEDURE — 65270000029 HC RM PRIVATE

## 2017-06-19 PROCEDURE — 36415 COLL VENOUS BLD VENIPUNCTURE: CPT | Performed by: INTERNAL MEDICINE

## 2017-06-19 PROCEDURE — 83735 ASSAY OF MAGNESIUM: CPT | Performed by: NURSE PRACTITIONER

## 2017-06-19 PROCEDURE — 85730 THROMBOPLASTIN TIME PARTIAL: CPT | Performed by: NURSE PRACTITIONER

## 2017-06-19 PROCEDURE — 74011250637 HC RX REV CODE- 250/637: Performed by: PHYSICIAN ASSISTANT

## 2017-06-19 PROCEDURE — 85384 FIBRINOGEN ACTIVITY: CPT | Performed by: NURSE PRACTITIONER

## 2017-06-19 PROCEDURE — 85025 COMPLETE CBC W/AUTO DIFF WBC: CPT | Performed by: NURSE PRACTITIONER

## 2017-06-19 PROCEDURE — 85610 PROTHROMBIN TIME: CPT | Performed by: NURSE PRACTITIONER

## 2017-06-19 PROCEDURE — 74011000258 HC RX REV CODE- 258: Performed by: PHYSICIAN ASSISTANT

## 2017-06-19 RX ORDER — ALBUMIN HUMAN 50 G/1000ML
4750 SOLUTION INTRAVENOUS ONCE
Status: DISCONTINUED | OUTPATIENT
Start: 2017-06-19 | End: 2017-06-20

## 2017-06-19 RX ADMIN — DIAZEPAM 10 MG: 5 TABLET ORAL at 23:38

## 2017-06-19 RX ADMIN — DESVENLAFAXINE 100 MG: 100 TABLET, EXTENDED RELEASE ORAL at 18:00

## 2017-06-19 RX ADMIN — PANTOPRAZOLE SODIUM 40 MG: 40 TABLET, DELAYED RELEASE ORAL at 05:47

## 2017-06-19 RX ADMIN — DEXTROAMPHETAMINE SACCHARATE, AMPHETAMINE ASPARTATE, DEXTROAMPHETAMINE SULFATE AND AMPHETAMINE SULFATE 30 MG: 3.75; 3.75; 3.75; 3.75 TABLET ORAL at 18:03

## 2017-06-19 RX ADMIN — SODIUM CHLORIDE 1000 MG: 900 INJECTION, SOLUTION INTRAVENOUS at 12:08

## 2017-06-19 RX ADMIN — MIRTAZAPINE 30 MG: 15 TABLET, FILM COATED ORAL at 21:17

## 2017-06-19 RX ADMIN — DIAZEPAM 10 MG: 5 TABLET ORAL at 02:51

## 2017-06-19 RX ADMIN — Medication 5 ML: at 21:20

## 2017-06-19 RX ADMIN — Medication 5 ML: at 05:47

## 2017-06-19 RX ADMIN — OXYCODONE HYDROCHLORIDE 30 MG: 15 TABLET ORAL at 23:38

## 2017-06-19 RX ADMIN — DOXAZOSIN 4 MG: 4 TABLET ORAL at 08:58

## 2017-06-19 RX ADMIN — GABAPENTIN 1200 MG: 400 CAPSULE ORAL at 08:58

## 2017-06-19 RX ADMIN — CEFTRIAXONE 1 G: 1 INJECTION, POWDER, FOR SOLUTION INTRAMUSCULAR; INTRAVENOUS at 14:44

## 2017-06-19 RX ADMIN — DESVENLAFAXINE 100 MG: 100 TABLET, EXTENDED RELEASE ORAL at 09:00

## 2017-06-19 RX ADMIN — AZITHROMYCIN MONOHYDRATE 500 MG: 500 INJECTION, POWDER, LYOPHILIZED, FOR SOLUTION INTRAVENOUS at 14:44

## 2017-06-19 RX ADMIN — BACLOFEN 20 MG: 10 TABLET ORAL at 18:03

## 2017-06-19 RX ADMIN — CYCLOPHOSPHAMIDE 150 MG: 50 CAPSULE ORAL at 21:17

## 2017-06-19 RX ADMIN — OXYCODONE HYDROCHLORIDE 30 MG: 15 TABLET ORAL at 02:51

## 2017-06-19 RX ADMIN — LAMOTRIGINE 100 MG: 100 TABLET ORAL at 08:58

## 2017-06-19 RX ADMIN — OXYCODONE HYDROCHLORIDE 30 MG: 15 TABLET ORAL at 18:03

## 2017-06-19 RX ADMIN — DIAZEPAM 10 MG: 5 TABLET ORAL at 09:04

## 2017-06-19 RX ADMIN — DEXTROAMPHETAMINE SACCHARATE, AMPHETAMINE ASPARTATE, DEXTROAMPHETAMINE SULFATE AND AMPHETAMINE SULFATE 30 MG: 3.75; 3.75; 3.75; 3.75 TABLET ORAL at 21:17

## 2017-06-19 RX ADMIN — OXYCODONE HYDROCHLORIDE 30 MG: 15 TABLET ORAL at 08:58

## 2017-06-19 RX ADMIN — BACLOFEN 20 MG: 10 TABLET ORAL at 21:17

## 2017-06-19 RX ADMIN — B-COMPLEX W/ C & FOLIC ACID TAB 1 TABLET: TAB at 08:59

## 2017-06-19 RX ADMIN — DEXTROAMPHETAMINE SACCHARATE, AMPHETAMINE ASPARTATE, DEXTROAMPHETAMINE SULFATE AND AMPHETAMINE SULFATE 30 MG: 3.75; 3.75; 3.75; 3.75 TABLET ORAL at 08:58

## 2017-06-19 RX ADMIN — GABAPENTIN 1200 MG: 400 CAPSULE ORAL at 21:17

## 2017-06-19 RX ADMIN — BACLOFEN 20 MG: 10 TABLET ORAL at 08:58

## 2017-06-19 RX ADMIN — GABAPENTIN 1200 MG: 400 CAPSULE ORAL at 18:03

## 2017-06-19 RX ADMIN — SODIUM CHLORIDE 100 ML/HR: 900 INJECTION, SOLUTION INTRAVENOUS at 12:08

## 2017-06-19 NOTE — PROGRESS NOTES
Rosalene Merlin Adcox  Admission Date: 6/15/2017             Daily Progress Note: 6/19/2017    The patient's chart is reviewed and the patient is discussed with the staff. 60 yo male presents with chills, shortness of breath, and edema. CT chest 4/21/17 revealed RLL 4.4 cm x 3.1cm density. Had chest CT 5/8/17 with multiple large infiltrative nodules in R lung concerning for cavitation. Was seen 6/8 by Dr. Bhumika Paiz and started on Levaquin on 6/9. Was seen by Dr. Bhumika Paiz the morning of admission, was not any better,hypoxic on RA, and was referred for admission / ongoing workup of cavitary lesion. History of smoking <1 ppd x 10 years off and on, quit in 2014. CRP elevated at 283. Anti GBM Ab +- initiated on Cyclophosphamide and pulse dose steroid 06/17/17. Nephrology consulted with no plans for renal bx at this time (normal renal function / no blood or microscopic protein in urine). Also seen by heme/Onc with plans for plasma pheresis. Chronic depression, anxiety, ADD,Hepatitis C, BPH, and chronic pain. Has been feeling poorly, lost 40lbs over 3 months secondary to anorexia. Weaned to room air.     Subjective:      Sitting on side of bed, NPO, weaned to room air and states is feeling much better than when admitted to hospital.      Current Facility-Administered Medications   Medication Dose Route Frequency    methylPREDNISolone (PF) (SOLU-MEDROL) 1,000 mg in 0.9% sodium chloride 100 mL IVPB  1,000 mg IntraVENous DAILY    cyclophosphamide (CYTOXAN) chemo capsule 150 mg  150 mg Oral Q24H    dextroamphetamine-amphetamine (ADDERALL) tablet 30 mg  30 mg Oral TID    baclofen (LIORESAL) tablet 20 mg  20 mg Oral TID    DESVENLAFAXINE SUCCINATE (PRISTIQ PO) - patient supplied  100 mg Oral BID    doxazosin (CARDURA) tablet 4 mg  4 mg Oral DAILY    gabapentin (NEURONTIN) capsule 1,200 mg  1,200 mg Oral TID    lamoTRIgine (LaMICtal) tablet 100 mg  100 mg Oral DAILY    mirtazapine (REMERON) tablet 30 mg  30 mg Oral QHS    multivitamin, stress formula (STRESS TAB) tablet 1 Tab  1 Tab Oral DAILY    pantoprazole (PROTONIX) tablet 40 mg  40 mg Oral ACB    oxyCODONE IR (OXY-IR) immediate release tablet 30 mg  30 mg Oral Q4H PRN    diazePAM (VALIUM) tablet 10 mg  10 mg Oral Q4H PRN    sodium chloride (NS) flush 5-10 mL  5-10 mL IntraVENous Q8H    sodium chloride (NS) flush 5-10 mL  5-10 mL IntraVENous PRN    albuterol (PROVENTIL VENTOLIN) nebulizer solution 2.5 mg  2.5 mg Nebulization Q6H PRN    cefTRIAXone (ROCEPHIN) 1 g in 0.9% sodium chloride (MBP/ADV) 50 mL  1 g IntraVENous Q24H    azithromycin (ZITHROMAX) 500 mg in 0.9% sodium chloride (MBP/ADV) 250 mL  500 mg IntraVENous Q24H    acetaminophen (TYLENOL) tablet 650 mg  650 mg Oral Q4H PRN    naloxone (NARCAN) injection 0.4 mg  0.4 mg IntraVENous PRN    ondansetron (ZOFRAN) injection 4 mg  4 mg IntraVENous Q4H PRN    senna-docusate (PERICOLACE) 8.6-50 mg per tablet 1 Tab  1 Tab Oral BID PRN    enoxaparin (LOVENOX) injection 40 mg  40 mg SubCUTAneous Q24H    0.9% sodium chloride infusion  100 mL/hr IntraVENous CONTINUOUS       Review of Systems  Constitutional: negative for fever, chills, sweats  Cardiovascular: negative for chest pain, palpitations, syncope, edema  Gastrointestinal:  negative for dysphagia, reflux, vomiting, diarrhea, abdominal pain, or melena  Neurologic:  negative for focal weakness, numbness, headache    Objective:     Vitals:    06/18/17 1530 06/18/17 1927 06/18/17 2314 06/19/17 0345   BP: 97/61 104/68 121/76 123/74   Pulse: 72 79 (!) 56 (!) 53   Resp: 18 18 18 18   Temp: 98.5 °F (36.9 °C) 98.6 °F (37 °C) 98.6 °F (37 °C) 98.3 °F (36.8 °C)   SpO2: 95% 94% 91% 95%   Weight:       Height:         Intake and Output:   06/17 1901 - 06/19 0700  In: 2400 [P.O.:1200;  I.V.:1200]  Out: -        Physical Exam:   Constitution:  the patient is well developed and in no acute distress, room air  EENMT:  Sclera clear, pupils equal, oral mucosa moist  Respiratory: diminished, clear to auscultation bilaterally, RA, no cough or sputum production  Cardiovascular:  RRR without M,G,R  Gastrointestinal: soft and non-tender; with positive bowel sounds. Musculoskeletal: warm without cyanosis. There is no lower leg edema. Skin:  no jaundice or rashes, no open wounds   Neurologic: no gross neuro deficits     Psychiatric:  alert and oriented x 3    CHEST XRAY: None today    Chest CT 6/15/17:                LAB   Recent Labs      06/18/17   0505   WBC  6.4   HGB  10.6*   HCT  34.1*   PLT  450     Recent Labs      06/18/17   0505   NA  146*   K  3.7   CL  110*   CO2  25   GLU  152*   BUN  9   CREA  0.82   CA  9.1     No results for input(s): PH, PCO2, PO2, HCO3 in the last 72 hours. No results for input(s): LCAD, LAC in the last 72 hours.       Assessment:  (Medical Decision Making)     Patient Active Problem List   Diagnosis Code    Anxiety and depression F41.9, F32.9    History of hepatitis C Z86.19    Chronic shoulder pain M25.519, G89.29    HLD (hyperlipidemia) E78.5    BPH (benign prostatic hyperplasia) N40.0    Pulmonary cavitary lesion J98.4    Abnormal CXR R93.8    Elevated C-reactive protein (CRP) R79.82    Goodpasture syndrome (HCC) M31.0         Plan:  (Medical Decision Making)     Hospital Problems  Date Reviewed: 6/19/2017          Codes Class Noted POA    BPH (benign prostatic hyperplasia) (Chronic) ICD-10-CM: N40.0  ICD-9-CM: 600.00  6/16/2017 Yes    chronic    * (Principal)Pulmonary cavitary lesion ICD-10-CM: J98.4  ICD-9-CM: 518.89  6/15/2017 Yes    Navigational bronch / EBUS scheduled for 6/20  Continue abx   CRP and anti-GBM ab elevated      Abnormal CXR ICD-10-CM: R93.8  ICD-9-CM: 793.2  6/15/2017 Yes    With cavitary lesions    Elevated C-reactive protein (CRP) ICD-10-CM: R79.82  ICD-9-CM: 790.95  6/15/2017 Yes    On burst dose steroids    Anxiety and depression (Chronic) ICD-10-CM: F41.9, F32.9  ICD-9-CM: 300.00, 311  8/7/2012 Yes Chronic      History of hepatitis C ICD-10-CM: Z86.19  ICD-9-CM: V12.09  8/7/2012 Yes     S/p treatment         Chronic shoulder pain (Chronic) ICD-10-CM: M25.519, G89.29  ICD-9-CM: 719.41, 338.29  8/7/2012 Yes    Chronic pain    HLD (hyperlipidemia) (Chronic) ICD-10-CM: E78.5  ICD-9-CM: 272.4  8/7/2012 Yes    chronic       --ANCA negative  --anti-GBM ab:  Glomerular Basement Membrane Ab 75 (H) 0 - 20 Final   --Zithromax / Rocephin day 5- plan to continue abx therapy while on pulse dose steroids-1000mg qd   6/15 BC: NGTD x 4,  6/9 BC: negative x 2  --NS at 100 ml/hr  --Planning bronch (+ navigational EBUS) - currently scheduled for 6/20/17  --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 UA without microscopic protein or RBC. --Heme/Onc  planning plasmapheresis for Anti-GBM Ab. On Cytoxan    More than 50% of the time documented was spent in face-to-face contact with the patient and in the care of the patient on the floor/unit where the patient is located. Abundio Bennett NP     Lungs:  clear  Heart:  RRR with no Murmur/Rubs/Gallops    Additional Comments: For navigation bronch tomorrow    I have spoken with and examined the patient. I agree with the above assessment and plan as documented.     Naveen Hearn MD

## 2017-06-19 NOTE — PROGRESS NOTES
Patient sitting up in bed in room, c/o generalized pain 6/10 and increased anxiety and depression, request oxycodone 30mg and prn Valium 10mg, provided @ this time with small sip of water.

## 2017-06-19 NOTE — PROGRESS NOTES
Patient resting in bed with eyes closed, alert to verbal stimuli admits to pain relief 01/0 with use of prn pain medications

## 2017-06-19 NOTE — PROGRESS NOTES
Spoke with Mac in IR regarding plasma phresis catheter. Radiologist to review order in am of 6-20-17 and will notify nursing in AM of time of catheter placement. Spoke with Jacqueline Kelsey, charge nurse on 5th Floor concerning albumin and calcium gluconate. Jacqueline Kelsey stated that Albumin and Calcium Gluconate will be given on Aphresis Machine after catheter placed. Instructed to cancel off MAR for today.

## 2017-06-19 NOTE — CDMP QUERY
Please clarify if this patient is being treated/managed for:    SEVERE PROTEIN CALORIE MALNUTRITION with ~ 38 lbs unintentional weight loss in 3 months, anorexia, and moderate to severe loss of muscle mass    =>Other Explanation of clinical findings  =>Unable to Determine (no explanation of clinical findings)    The medical record reflects the following:    Risk Factors: Cavitary Mass, Anorexia, Decreased appetite    Clinical Indicators:   Meets Criteria for Malnutrition in the context of Acute Illness/Injury   [X] Severe Malnutrition, as evidenced by:  [X] Moderate to severe loss of muscle mass  [ ] Nutritional intake of <50% of energy intake compared to estimated energy needs for > 5 days  [X] Weight loss of >2% in 1 week, >5% in 1 month, >7.5% in 3 months  [ ] Moderate to severe edema  [ ] Moderate to severe loss of subcutaneous fat                  Treatment: Magic cup added to lunch and dinner    Please clarify and document your clinical opinion in the progress notes and discharge summary including the definitive and/or presumptive diagnosis, (suspected or probable), related to the above clinical findings. Please include clinical findings supporting your diagnosis.     Thanks,  Flynn Rodriguez RN, CDS  [employer] Documentation Management Program  [phone]

## 2017-06-19 NOTE — PROGRESS NOTES
Inpatient Hematology / Oncology Progress Note      Admission Date: 6/15/2017 11:50 AM  Reason for Admission/Hospital Course: Lung mass [R91.8]      24 Hour Events:  Afebrile, VSS  No new complaints      ROS:  Constitutional: Negative for fever, chills, weakness, malaise, fatigue. CV: Positive for BLE edema; negative for chest pain, palpitations. Respiratory: Negative for dyspnea, cough, wheezing. GI: Negative for nausea, abdominal pain, diarrhea. 10 point review of systems is otherwise negative with the exception of the elements mentioned above in the HPI. No Known Allergies    OBJECTIVE:  Patient Vitals for the past 8 hrs:   BP Temp Pulse Resp SpO2   17 1155 106/65 97.9 °F (36.6 °C) 67 18 98 %   17 0740 111/74 98.3 °F (36.8 °C) (!) 51 18 99 %     Temp (24hrs), Av.4 °F (36.9 °C), Min:97.9 °F (36.6 °C), Max:98.6 °F (37 °C)     07 -  1900  In: 240 [P.O.:240]  Out: -     Physical Exam:  Constitutional: Well developed, well nourished male in no acute distress, lying comfortably in the hospital bed. HEENT: Normocephalic and atraumatic. Oropharynx is clear, mucous membranes are moist. Sclerae anicteric. Neck supple without JVD. No thyromegaly present. Skin Warm and dry. No bruising and no rash noted. No erythema. No pallor. Respiratory Lungs are clear to auscultation bilaterally without wheezes, rales or rhonchi, normal air exchange without accessory muscle use. CVS Normal rate, regular rhythm and normal S1 and S2. No murmurs, gallops, or rubs. Abdomen RUQ tenderness with palpation-he states is chronic. Soft and nondistended, normoactive bowel sounds. No palpable mass. Neuro Grossly nonfocal with no obvious sensory or motor deficits. MSK Normal range of motion in general.  No edema and no tenderness. Psych Appropriate mood and affect.         Labs:    Recent Labs      17   0505   WBC  6.4   RBC  4.20*   HGB  10.6*   HCT  34.1*   MCV  81.2   MCH 25.2*   MCHC  31.1*   RDW  16.8*   PLT  450   GRANS  82*   LYMPH  17   MONOS  1*   EOS  0*   BASOS  0   IG  0.5   DF  AUTOMATED   ANEU  5.3   ABL  1.1   ABM  0.1   YEMI  0.0   ABB  0.0   AIG  0.0      Recent Labs      06/19/17   0530  06/18/17   0505   NA  146*  146*   K  4.1  3.7   CL  111*  110*   CO2  27  25   AGAP  8  11   GLU  80  152*   BUN  15  9   CREA  0.79*  0.82   GFRAA  >60  >60   GFRNA  >60  >60   CA  9.1  9.1         Imaging:  CT Chest 6/15/17  HISTORY: Lower extremities pain and swelling. Pulmonary infiltrates.     EXAM: Contiguous axial CT images were obtained from the thoracic inlet to the  upper abdomen following the uneventful IV administration of 100 mL Isovue-370. Coronal reformations were performed.     Radiation dose reduction techniques were used for this study. All CT scans  performed at this facility use one or all of the following: Automated exposure  control, adjustment of the mA and/or kVp according to patient's size, and  iterative reconstruction.     PRIOR STUDY: 05/08/2017     FINDINGS: There is no evidence of pulmonary embolus through the subsegmental  level bilaterally. The main pulmonary artery is normal in size. There are  multiple mildly prominent mediastinal lymph nodes with a representative right  paratracheal lymph node measuring 18 x 10 mm. There are nonenlarged hilar lymph  nodes bilaterally. There is no pericardial effusion.     There has been progressive consolidation and volume loss in the right upper lobe  with areas of air bronchograms as well as multiple cavitary foci. The  consolidative and cavitary process in the right lower lobe has also mildly  enlarged, now measuring 5.6 x 2.5 cm compared with 4.2 x 2.5 cm previously. There is a new 6 mm irregular, nodular density (image 72) located just anterior  and inferior to this larger process.  There are minor groundglass opacities in  the left lower lobe, new from the prior exam. There is a background of  centrilobular emphysema. There is no pleural effusion. Several tiny nodular  densities in the right lung base (images 93-96) have developed since the prior  exam.     There are subcentimeter hypodensities in the left hepatic lobe. Several  calcified gallstones are present in the gallbladder. The remaining included  upper abdominal organs are unremarkable. There are no acute or concerning bony  lesions.     IMPRESSION  IMPRESSION:      1. Progressive consolidation and volume loss in the right upper lobe with new  areas of cavitation. The consolidative cavitary process in the right lower lobe  has also mildly enlarged, and several new irregular and groundglass nodules have  developed bilaterally. Findings are suggestive of an infectious process,  possibly fungal.     2. No evidence of pulmonary embolus.     3. Mildly prominent mediastinal lymph nodes which are likely reactive.     4. Cholelithiasis.         ASSESSMENT:    Problem List  Date Reviewed: 6/19/2017          Codes Class Noted    Goodpasture syndrome [facility]) ICD-10-CM: M31.0  ICD-9-CM: 446.21  6/17/2017        BPH (benign prostatic hyperplasia) (Chronic) ICD-10-CM: N40.0  ICD-9-CM: 600.00  6/16/2017        Anxiety and depression (Chronic) ICD-10-CM: F41.9, F32.9  ICD-9-CM: 300.00, 311  6/15/2017        History of hepatitis C (Chronic) ICD-10-CM: Z86.19  ICD-9-CM: V12.09  6/15/2017    Overview Signed 8/7/2012 10:55 AM by Beatris Romberg III     S/p treatment             Chronic shoulder pain (Chronic) ICD-10-CM: M25.519, G89.29  ICD-9-CM: 719.41, 338.29  6/15/2017        HLD (hyperlipidemia) (Chronic) ICD-10-CM: E78.5  ICD-9-CM: 272.4  6/15/2017        * (Principal)Pulmonary cavitary lesion ICD-10-CM: J98.4  ICD-9-CM: 518.89  6/15/2017        Abnormal CXR ICD-10-CM: R93.8  ICD-9-CM: 793.2  6/15/2017        Elevated C-reactive protein (CRP) ICD-10-CM: R79.82  ICD-9-CM: 790.95  6/15/2017                PLAN:  Goodpasture syndrome   6/19: Mr Keen was informed of the apheresis process and purpose and is agreeable. IR was consulted today for temp cath placement. Once line is placed we will start plasmapheresis with the plan of daily for 5 days and then every other day. GBM antibody was 75 on admission. Per Pulmonary, he is scheduled for Bronch/EBUS tomorrow. He will continue on steroids and cytoxan.          [facility]  [address] [place] [address]  Office : [phone]  Fax : [phone]

## 2017-06-19 NOTE — PROGRESS NOTES
[facility]    Follow-Up on:6/19/17    HPI: doing well    ROS:  Denies CP, SOB.     Current Facility-Administered Medications   Medication Dose Route Frequency    methylPREDNISolone (PF) (SOLU-MEDROL) 1,000 mg in 0.9% sodium chloride 100 mL IVPB  1,000 mg IntraVENous DAILY    cyclophosphamide (CYTOXAN) chemo capsule 150 mg  150 mg Oral Q24H    dextroamphetamine-amphetamine (ADDERALL) tablet 30 mg  30 mg Oral TID    baclofen (LIORESAL) tablet 20 mg  20 mg Oral TID    DESVENLAFAXINE SUCCINATE (PRISTIQ PO) - patient supplied  100 mg Oral BID    doxazosin (CARDURA) tablet 4 mg  4 mg Oral DAILY    gabapentin (NEURONTIN) capsule 1,200 mg  1,200 mg Oral TID    lamoTRIgine (LaMICtal) tablet 100 mg  100 mg Oral DAILY    mirtazapine (REMERON) tablet 30 mg  30 mg Oral QHS    multivitamin, stress formula (STRESS TAB) tablet 1 Tab  1 Tab Oral DAILY    pantoprazole (PROTONIX) tablet 40 mg  40 mg Oral ACB    oxyCODONE IR (OXY-IR) immediate release tablet 30 mg  30 mg Oral Q4H PRN    diazePAM (VALIUM) tablet 10 mg  10 mg Oral Q4H PRN    sodium chloride (NS) flush 5-10 mL  5-10 mL IntraVENous Q8H    sodium chloride (NS) flush 5-10 mL  5-10 mL IntraVENous PRN    albuterol (PROVENTIL VENTOLIN) nebulizer solution 2.5 mg  2.5 mg Nebulization Q6H PRN    cefTRIAXone (ROCEPHIN) 1 g in 0.9% sodium chloride (MBP/ADV) 50 mL  1 g IntraVENous Q24H    azithromycin (ZITHROMAX) 500 mg in 0.9% sodium chloride (MBP/ADV) 250 mL  500 mg IntraVENous Q24H    acetaminophen (TYLENOL) tablet 650 mg  650 mg Oral Q4H PRN    naloxone (NARCAN) injection 0.4 mg  0.4 mg IntraVENous PRN    ondansetron (ZOFRAN) injection 4 mg  4 mg IntraVENous Q4H PRN    senna-docusate (PERICOLACE) 8.6-50 mg per tablet 1 Tab  1 Tab Oral BID PRN    0.9% sodium chloride infusion  100 mL/hr IntraVENous CONTINUOUS       Exam:  Vitals:    06/18/17 2314 06/19/17 0345 06/19/17 0740 06/19/17 1155   BP: 121/76 123/74 111/74 106/65   Pulse: (!) 56 (!) 53 (!) 51 67   Resp: 18 18 18 18   Temp: 98.6 °F (37 °C) 98.3 °F (36.8 °C) 98.3 °F (36.8 °C) 97.9 °F (36.6 °C)   SpO2: 91% 95% 99% 98%   Weight:       Height:             Intake/Output Summary (Last 24 hours) at 06/19/17 1302  Last data filed at 06/19/17 1001   Gross per 24 hour   Intake             2040 ml   Output                0 ml   Net             2040 ml     PE:  GEN - in no distress  CV - regular, no murmur, no rub  Lung - clear bilaterally  Abd - soft, nontender  Ext - no edema    Labs  Recent Labs      06/18/17   0505   WBC  6.4   HGB  10.6*   HCT  34.1*   PLT  450     Recent Labs      06/19/17   0530  06/18/17   0505   NA  146*  146*   K  4.1  3.7   CL  111*  110*   CO2  27  25   BUN  15  9   CREA  0.79*  0.82   GLU  80  152*   CA  9.1  9.1     No results for input(s): PH, PCO2, PO2, PCO2 in the last 72 hours. Problem List:  Patient Active Problem List    Diagnosis Date Noted    Goodpasture syndrome ([address] 06/17/2017    BPH (benign prostatic hyperplasia) 06/16/2017    Anxiety and depression 06/15/2017    History of hepatitis C 06/15/2017    Chronic shoulder pain 06/15/2017    HLD (hyperlipidemia) 06/15/2017    Pulmonary cavitary lesion 06/15/2017    Abnormal CXR 06/15/2017    Elevated C-reactive protein (CRP) 06/15/2017       Issues Addressed By Nephrology:    Plan:  1. Goodpastures  2. Pulmonary cavitary lesions  3.  Normal renal fxn  I will sign off nothing to add now

## 2017-06-19 NOTE — PROGRESS NOTES
Patient sitting up in bed in room, alert and oriented, HOB elevated, on RA denies dyspnea and SOB, patient IV intact, NS running @ 100/hr,  NPO after midnight tonight, will continue to monitor, call light within reach.

## 2017-06-19 NOTE — PROGRESS NOTES
Patient is alert and oriented X3, up and ambulatory in room @ this time, IV intact, patient remains NPO with meals, medications taken with small sips of water, patient denies pain and discomfort, will continue to monitor.

## 2017-06-19 NOTE — PROGRESS NOTES
Report received from KAILA [facility]. AT [place]

## 2017-06-19 NOTE — ADT AUTH CERT NOTES
Utilization Review           CONSULT NOTE 6/17/17 by Adithya Brown RN        Review Status Review Entered       In Primary 6/19/2017       Details         NEPH CONSULT 6/17/17  Mr Brandon Prakash is a 61year old  gentleman referred for possible renal biopsy.     Mr Carey Sparrow has known dyslipidemia, depression, chronic hepatitis C, BPH.     He has had malaise and weight loss for months and was found to have a R lower lobe cavitary pulmonary lesion. He has not had any cough nor hemoptysis.     He also reports some Dyspnea. There was also transient bipedal edema a few weeks ago that resolved spontaneously.     Urinalysis showed low-grade microhematuria last week along with mild proteinuria; the repeat this week is negative for protein and blood. Serum creatinine has varied from 0.89 to 1.01 thus far.     PEDRO, ANCA, anti-MPO and anti-Pr 3 are negative. 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 He is getting started on methylprednisolone and cyclophosphamide. Assessment:   New cavitary lesion with positive serology for GBM antibody  -being started on steroids and cyclophosphamide  -GFR preserved; most recent UA w/o blood or protein.  Will not plan on renal biopsy for now but will continue following.  -will ask heme to see for consideration for plasmapheresis                   Pulmonary Disease [facility] - Care Day 4 (6/18/2017) by Adithya Brown RN        Review Status Review Entered       Completed 6/19/2017       Details              Care Day: 4 Care Date: 6/18/2017 Level of Care: Inpatient Floor       Guideline Day 2        Level Of Care       (X) Floor              Clinical Status       (X) * No ICU or intermediate care needs       (X) * No mechanical ventilation required [H]              Interventions       (X) Inpatient interventions continue       6/19/2017 9:23 AM EDT by Colton Greenwood         NUT SUPP, NPO, AMB W/ ASSIST NS DRIP @ 100, ZITHROMAX 500 MG QD IV, ROCEPHIN 1G QD IV, LIORESAL 20 MG TID PO, CYTOXAN CHEMO 150 MG QD PO, PRISTIQ  BID PO, SOLUMEDROL 1000 MG QD IV, OXY IR 30 MG PRN PO X 2                                          * Milestone              Additional Notes       NEPH NOTE       Appetite much better (on steroids)        Alert, communicative, no distress       Min basilar crackles       S1, s2, no rubs       Abdomen soft, nontender       Trace L leg edema; mild L calf tenderness       New cavitary lesion with positive serology for GBM antibody       - started on steroids and cyclophosphamide 6/17/17       -GFR preserved; most recent UA w/o blood or protein. Will not plan on renal biopsy for now but will continue following.       -heme plans plasmapheresis                Borderline hypernatremia               L calf tenderness       -mild but definitely different from the R side. Venous duplex negative. Will add-on a CPK              PULM NOTE       Currently on RA with o2 sat 97%. Denies cough, hemoptysis or mucus production. Heaviness in chest. Denies n/v.         --ANCA,       --anti-GBM ab:       Glomerular Basement Membrane Ab 75 (H) 0 - 20 units Final           Comment:          (NOTE)                          Negative                   0 - 20                          Weak Positive             21 - 30                          Moderate to Strong Positive   >30        Performed At: [address] [id number]        Liliana Marquez MD IT:[id number]                        --Zithromax / Rocephin - plan to continue abx therapy while on pulse dose steroids       6/15 BC: NGTD x 2 / 6/9 BC: negative x 2       PCT <0.1       WBC 6.4       --IVF at 100 ml/hr       --planning bronch (+ navigational EBUS) - currently scheduled for 6/20/17       --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        UA without microscopic protein or RBC.      Heme/Onc consulted for possible plasmapheresis - plan to initiate tomorrow               Additional Comments: Pt vaping in room using brother's device. Encouraged to stop. Feeling better and now on RA. Brandan bronch for 6/20.  Heme/Onc planning plasmapheresis for Anti-GBM Ab.              BP 97/61, TEMP 98.4, HR 57, RR 20, O2 97 ON RA              RBC 4.20, HGB 10.6, HCT 34.1, , , GLUC 152       NO IMAGING

## 2017-06-20 ENCOUNTER — HOSPITAL ENCOUNTER (OUTPATIENT)
Dept: INFUSION THERAPY | Age: 57
Discharge: HOME | End: 2017-06-20
Payer: MEDICARE

## 2017-06-20 ENCOUNTER — APPOINTMENT (OUTPATIENT)
Dept: GENERAL RADIOLOGY | Age: 57
DRG: 987 | End: 2017-06-20
Attending: INTERNAL MEDICINE
Payer: MEDICARE

## 2017-06-20 ENCOUNTER — APPOINTMENT (OUTPATIENT)
Dept: INTERVENTIONAL RADIOLOGY/VASCULAR | Age: 57
DRG: 987 | End: 2017-06-20
Attending: NURSE PRACTITIONER
Payer: MEDICARE

## 2017-06-20 PROBLEM — E43 SEVERE PROTEIN-CALORIE MALNUTRITION (HCC): Chronic | Status: ACTIVE | Noted: 2017-06-20

## 2017-06-20 PROBLEM — R00.1 BRADYCARDIA: Status: ACTIVE | Noted: 2017-06-20

## 2017-06-20 LAB
ABO + RH BLD: NORMAL
ANION GAP BLD CALC-SCNC: 7 MMOL/L (ref 7–16)
APPEARANCE FLD: CLEAR
APTT PPP: 26.8 SEC (ref 23.5–31.7)
ATRIAL RATE: 41 BPM
BACTERIA SPEC CULT: NORMAL
BACTERIA SPEC CULT: NORMAL
BASOPHILS # BLD AUTO: 0 K/UL (ref 0–0.2)
BASOPHILS # BLD: 0 % (ref 0–2)
BUN SERPL-MCNC: 15 MG/DL (ref 6–23)
CALCIUM SERPL-MCNC: 8.8 MG/DL (ref 8.3–10.4)
CALCULATED P AXIS, ECG09: 38 DEGREES
CALCULATED R AXIS, ECG10: 26 DEGREES
CALCULATED T AXIS, ECG11: 36 DEGREES
CHLORIDE SERPL-SCNC: 109 MMOL/L (ref 98–107)
CO2 SERPL-SCNC: 30 MMOL/L (ref 21–32)
COLOR FLD: COLORLESS
CREAT SERPL-MCNC: 0.78 MG/DL (ref 0.8–1.5)
DIAGNOSIS, 93000: NORMAL
DIFFERENTIAL METHOD BLD: ABNORMAL
EOSINOPHIL # BLD: 0 K/UL (ref 0–0.8)
EOSINOPHIL NFR BLD: 0 % (ref 0.5–7.8)
ERYTHROCYTE [DISTWIDTH] IN BLOOD BY AUTOMATED COUNT: 18 % (ref 11.9–14.6)
FIBRINOGEN PPP-MCNC: 318 MG/DL (ref 172–437)
GLUCOSE SERPL-MCNC: 75 MG/DL (ref 65–100)
HCT VFR BLD AUTO: 33.6 % (ref 41.1–50.3)
HGB BLD-MCNC: 10.5 G/DL (ref 13.6–17.2)
IMM GRANULOCYTES # BLD: 0 K/UL (ref 0–0.5)
IMM GRANULOCYTES NFR BLD AUTO: 0 % (ref 0–5)
INR PPP: 1.2 (ref 0.9–1.2)
LYMPHOCYTES # BLD AUTO: 36 % (ref 13–44)
LYMPHOCYTES # BLD: 3.7 K/UL (ref 0.5–4.6)
MCH RBC QN AUTO: 25.5 PG (ref 26.1–32.9)
MCHC RBC AUTO-ENTMCNC: 31.3 G/DL (ref 31.4–35)
MCV RBC AUTO: 81.8 FL (ref 79.6–97.8)
MONOCYTES # BLD: 0.6 K/UL (ref 0.1–1.3)
MONOCYTES NFR BLD AUTO: 6 % (ref 4–12)
NEUTS SEG # BLD: 6 K/UL (ref 1.7–8.2)
NEUTS SEG NFR BLD AUTO: 58 % (ref 43–78)
NUC CELL # FLD: 8 /CU MM
P-R INTERVAL, ECG05: 112 MS
PLATELET # BLD AUTO: 415 K/UL (ref 150–450)
PLATELET COMMENTS,PCOM: ADEQUATE
PMV BLD AUTO: 8.8 FL (ref 10.8–14.1)
POTASSIUM SERPL-SCNC: 3.5 MMOL/L (ref 3.5–5.1)
PROTHROMBIN TIME: 13 SEC (ref 9.6–12)
Q-T INTERVAL, ECG07: 506 MS
QRS DURATION, ECG06: 98 MS
QTC CALCULATION (BEZET), ECG08: 417 MS
RBC # BLD AUTO: 4.11 M/UL (ref 4.23–5.67)
RBC # FLD: NORMAL /CU MM
RBC MORPH BLD: ABNORMAL
SERVICE CMNT-IMP: NORMAL
SERVICE CMNT-IMP: NORMAL
SODIUM SERPL-SCNC: 146 MMOL/L (ref 136–145)
SPECIMEN SOURCE FLD: NORMAL
VENTRICULAR RATE, ECG03: 41 BPM
WBC # BLD AUTO: 10.3 K/UL (ref 4.3–11.1)
WBC MORPH BLD: ABNORMAL

## 2017-06-20 PROCEDURE — 87106 FUNGI IDENTIFICATION YEAST: CPT | Performed by: INTERNAL MEDICINE

## 2017-06-20 PROCEDURE — 87799 DETECT AGENT NOS DNA QUANT: CPT | Performed by: INTERNAL MEDICINE

## 2017-06-20 PROCEDURE — 0B9C8ZX DRAINAGE OF RIGHT UPPER LUNG LOBE, VIA NATURAL OR ARTIFICIAL OPENING ENDOSCOPIC, DIAGNOSTIC: ICD-10-PCS | Performed by: RADIOLOGY

## 2017-06-20 PROCEDURE — 77030012699 HC VLV SUC CNTRL OCOA -A: Performed by: INTERNAL MEDICINE

## 2017-06-20 PROCEDURE — 85025 COMPLETE CBC W/AUTO DIFF WBC: CPT | Performed by: INTERNAL MEDICINE

## 2017-06-20 PROCEDURE — 85730 THROMBOPLASTIN TIME PARTIAL: CPT | Performed by: INTERNAL MEDICINE

## 2017-06-20 PROCEDURE — 87102 FUNGUS ISOLATION CULTURE: CPT | Performed by: INTERNAL MEDICINE

## 2017-06-20 PROCEDURE — 87254 VIRUS INOCULATION SHELL VIA: CPT | Performed by: INTERNAL MEDICINE

## 2017-06-20 PROCEDURE — 87541 LEGION PNEUMO DNA AMP PROB: CPT | Performed by: INTERNAL MEDICINE

## 2017-06-20 PROCEDURE — 87116 MYCOBACTERIA CULTURE: CPT | Performed by: INTERNAL MEDICINE

## 2017-06-20 PROCEDURE — 87075 CULTR BACTERIA EXCEPT BLOOD: CPT | Performed by: INTERNAL MEDICINE

## 2017-06-20 PROCEDURE — 89050 BODY FLUID CELL COUNT: CPT | Performed by: INTERNAL MEDICINE

## 2017-06-20 PROCEDURE — 74011250636 HC RX REV CODE- 250/636

## 2017-06-20 PROCEDURE — 87486 CHLMYD PNEUM DNA AMP PROBE: CPT | Performed by: INTERNAL MEDICINE

## 2017-06-20 PROCEDURE — 88305 TISSUE EXAM BY PATHOLOGIST: CPT | Performed by: INTERNAL MEDICINE

## 2017-06-20 PROCEDURE — 99232 SBSQ HOSP IP/OBS MODERATE 35: CPT | Performed by: INTERNAL MEDICINE

## 2017-06-20 PROCEDURE — 93005 ELECTROCARDIOGRAM TRACING: CPT | Performed by: INTERNAL MEDICINE

## 2017-06-20 PROCEDURE — 74011000258 HC RX REV CODE- 258: Performed by: PHYSICIAN ASSISTANT

## 2017-06-20 PROCEDURE — 74011250637 HC RX REV CODE- 250/637: Performed by: INTERNAL MEDICINE

## 2017-06-20 PROCEDURE — 74011000250 HC RX REV CODE- 250: Performed by: INTERNAL MEDICINE

## 2017-06-20 PROCEDURE — 36514 APHERESIS PLASMA: CPT

## 2017-06-20 PROCEDURE — 65270000029 HC RM PRIVATE

## 2017-06-20 PROCEDURE — 02H633Z INSERTION OF INFUSION DEVICE INTO RIGHT ATRIUM, PERCUTANEOUS APPROACH: ICD-10-PCS | Performed by: RADIOLOGY

## 2017-06-20 PROCEDURE — 77001 FLUOROGUIDE FOR VEIN DEVICE: CPT

## 2017-06-20 PROCEDURE — C1894 INTRO/SHEATH, NON-LASER: HCPCS

## 2017-06-20 PROCEDURE — 87305 ASPERGILLUS AG IA: CPT | Performed by: INTERNAL MEDICINE

## 2017-06-20 PROCEDURE — 87633 RESP VIRUS 12-25 TARGETS: CPT | Performed by: INTERNAL MEDICINE

## 2017-06-20 PROCEDURE — 87255 GENET VIRUS ISOLATE HSV: CPT | Performed by: INTERNAL MEDICINE

## 2017-06-20 PROCEDURE — 36430 TRANSFUSION BLD/BLD COMPNT: CPT

## 2017-06-20 PROCEDURE — 99152 MOD SED SAME PHYS/QHP 5/>YRS: CPT | Performed by: INTERNAL MEDICINE

## 2017-06-20 PROCEDURE — 85384 FIBRINOGEN ACTIVITY: CPT | Performed by: INTERNAL MEDICINE

## 2017-06-20 PROCEDURE — 74011000250 HC RX REV CODE- 250: Performed by: PHYSICIAN ASSISTANT

## 2017-06-20 PROCEDURE — P9059 PLASMA, FRZ BETWEEN 8-24HOUR: HCPCS | Performed by: INTERNAL MEDICINE

## 2017-06-20 PROCEDURE — 6A551Z3 PHERESIS OF PLASMA, MULTIPLE: ICD-10-PCS | Performed by: INTERNAL MEDICINE

## 2017-06-20 PROCEDURE — 74011250637 HC RX REV CODE- 250/637: Performed by: PHYSICIAN ASSISTANT

## 2017-06-20 PROCEDURE — 74011250636 HC RX REV CODE- 250/636: Performed by: INTERNAL MEDICINE

## 2017-06-20 PROCEDURE — 80048 BASIC METABOLIC PNL TOTAL CA: CPT | Performed by: INTERNAL MEDICINE

## 2017-06-20 PROCEDURE — 86900 BLOOD TYPING SEROLOGIC ABO: CPT | Performed by: INTERNAL MEDICINE

## 2017-06-20 PROCEDURE — 0BBC8ZX EXCISION OF RIGHT UPPER LUNG LOBE, VIA NATURAL OR ARTIFICIAL OPENING ENDOSCOPIC, DIAGNOSTIC: ICD-10-PCS | Performed by: RADIOLOGY

## 2017-06-20 PROCEDURE — 83520 IMMUNOASSAY QUANT NOS NONAB: CPT | Performed by: INTERNAL MEDICINE

## 2017-06-20 PROCEDURE — 88112 CYTOPATH CELL ENHANCE TECH: CPT | Performed by: INTERNAL MEDICINE

## 2017-06-20 PROCEDURE — 85610 PROTHROMBIN TIME: CPT | Performed by: INTERNAL MEDICINE

## 2017-06-20 PROCEDURE — C1752 CATH,HEMODIALYSIS,SHORT-TERM: HCPCS

## 2017-06-20 PROCEDURE — 86356 MONONUCLEAR CELL ANTIGEN: CPT | Performed by: INTERNAL MEDICINE

## 2017-06-20 PROCEDURE — 77030022556 HC FCPS BIOP TIS ENDOSC BSC -B: Performed by: INTERNAL MEDICINE

## 2017-06-20 PROCEDURE — 77030002916 HC SUT ETHLN J&J -A

## 2017-06-20 PROCEDURE — 76040000025: Performed by: INTERNAL MEDICINE

## 2017-06-20 PROCEDURE — 74011250636 HC RX REV CODE- 250/636: Performed by: PHYSICIAN ASSISTANT

## 2017-06-20 PROCEDURE — 99153 MOD SED SAME PHYS/QHP EA: CPT | Performed by: INTERNAL MEDICINE

## 2017-06-20 PROCEDURE — 77030018719 HC DRSG PTCH ANTIMIC J&J -A

## 2017-06-20 PROCEDURE — 87070 CULTURE OTHR SPECIMN AEROBIC: CPT | Performed by: INTERNAL MEDICINE

## 2017-06-20 PROCEDURE — 36415 COLL VENOUS BLD VENIPUNCTURE: CPT | Performed by: INTERNAL MEDICINE

## 2017-06-20 PROCEDURE — 83516 IMMUNOASSAY NONANTIBODY: CPT | Performed by: INTERNAL MEDICINE

## 2017-06-20 PROCEDURE — 31628 BRONCHOSCOPY/LUNG BX EACH: CPT | Performed by: INTERNAL MEDICINE

## 2017-06-20 RX ORDER — LIDOCAINE HYDROCHLORIDE AND EPINEPHRINE 20; 5 MG/ML; UG/ML
2-20 INJECTION, SOLUTION EPIDURAL; INFILTRATION; INTRACAUDAL; PERINEURAL ONCE
Status: ACTIVE | OUTPATIENT
Start: 2017-06-20 | End: 2017-06-20

## 2017-06-20 RX ORDER — SODIUM CHLORIDE 0.9 % (FLUSH) 0.9 %
5-10 SYRINGE (ML) INJECTION AS NEEDED
Status: DISCONTINUED | OUTPATIENT
Start: 2017-06-20 | End: 2017-06-29 | Stop reason: HOSPADM

## 2017-06-20 RX ORDER — DIPHENHYDRAMINE HCL 25 MG
25 CAPSULE ORAL
Status: DISCONTINUED | OUTPATIENT
Start: 2017-06-20 | End: 2017-06-29 | Stop reason: HOSPADM

## 2017-06-20 RX ORDER — LIDOCAINE HYDROCHLORIDE 20 MG/ML
2-20 INJECTION, SOLUTION INFILTRATION; PERINEURAL
Status: COMPLETED | OUTPATIENT
Start: 2017-06-20 | End: 2017-06-20

## 2017-06-20 RX ORDER — LIDOCAINE HYDROCHLORIDE 40 MG/ML
SOLUTION TOPICAL ONCE
Status: COMPLETED | OUTPATIENT
Start: 2017-06-20 | End: 2017-06-20

## 2017-06-20 RX ORDER — FENTANYL CITRATE 50 UG/ML
50 INJECTION, SOLUTION INTRAMUSCULAR; INTRAVENOUS
Status: DISCONTINUED | OUTPATIENT
Start: 2017-06-20 | End: 2017-06-20 | Stop reason: HOSPADM

## 2017-06-20 RX ORDER — LIDOCAINE HYDROCHLORIDE 20 MG/ML
JELLY TOPICAL AS NEEDED
Status: DISCONTINUED | OUTPATIENT
Start: 2017-06-20 | End: 2017-06-29 | Stop reason: HOSPADM

## 2017-06-20 RX ORDER — HEPARIN SODIUM 200 [USP'U]/100ML
1000 INJECTION, SOLUTION INTRAVENOUS
Status: DISCONTINUED | OUTPATIENT
Start: 2017-06-20 | End: 2017-06-29 | Stop reason: HOSPADM

## 2017-06-20 RX ORDER — SODIUM CHLORIDE 0.9 % (FLUSH) 0.9 %
5-10 SYRINGE (ML) INJECTION EVERY 8 HOURS
Status: DISCONTINUED | OUTPATIENT
Start: 2017-06-20 | End: 2017-06-29 | Stop reason: HOSPADM

## 2017-06-20 RX ORDER — HEPARIN SODIUM 1000 [USP'U]/ML
1000-10000 INJECTION, SOLUTION INTRAVENOUS; SUBCUTANEOUS
Status: COMPLETED | OUTPATIENT
Start: 2017-06-20 | End: 2017-06-20

## 2017-06-20 RX ORDER — MIDAZOLAM HYDROCHLORIDE 1 MG/ML
.25-5 INJECTION, SOLUTION INTRAMUSCULAR; INTRAVENOUS
Status: DISCONTINUED | OUTPATIENT
Start: 2017-06-20 | End: 2017-06-20 | Stop reason: HOSPADM

## 2017-06-20 RX ORDER — ACETAMINOPHEN 325 MG/1
650 TABLET ORAL
Status: DISCONTINUED | OUTPATIENT
Start: 2017-06-20 | End: 2017-06-29 | Stop reason: HOSPADM

## 2017-06-20 RX ADMIN — DIAZEPAM 10 MG: 5 TABLET ORAL at 16:31

## 2017-06-20 RX ADMIN — CALCIUM GLUCONATE 4 G: 94 INJECTION, SOLUTION INTRAVENOUS at 18:15

## 2017-06-20 RX ADMIN — Medication 10 ML: at 21:04

## 2017-06-20 RX ADMIN — ACETAMINOPHEN 650 MG: 325 TABLET ORAL at 17:12

## 2017-06-20 RX ADMIN — DIAZEPAM 10 MG: 5 TABLET ORAL at 04:01

## 2017-06-20 RX ADMIN — FENTANYL CITRATE 50 MCG: 50 INJECTION, SOLUTION INTRAMUSCULAR; INTRAVENOUS at 13:55

## 2017-06-20 RX ADMIN — HEPARIN SODIUM 2900 UNITS: 1000 INJECTION, SOLUTION INTRAVENOUS; SUBCUTANEOUS at 08:29

## 2017-06-20 RX ADMIN — MIDAZOLAM HYDROCHLORIDE 2 MG: 1 INJECTION, SOLUTION INTRAMUSCULAR; INTRAVENOUS at 13:38

## 2017-06-20 RX ADMIN — FENTANYL CITRATE 50 MCG: 50 INJECTION, SOLUTION INTRAMUSCULAR; INTRAVENOUS at 13:42

## 2017-06-20 RX ADMIN — LIDOCAINE HYDROCHLORIDE 250 MG: 20 INJECTION, SOLUTION INFILTRATION; PERINEURAL at 08:23

## 2017-06-20 RX ADMIN — DEXTROAMPHETAMINE SACCHARATE, AMPHETAMINE ASPARTATE, DEXTROAMPHETAMINE SULFATE AND AMPHETAMINE SULFATE 30 MG: 3.75; 3.75; 3.75; 3.75 TABLET ORAL at 21:04

## 2017-06-20 RX ADMIN — MIDAZOLAM HYDROCHLORIDE 1 MG: 1 INJECTION, SOLUTION INTRAMUSCULAR; INTRAVENOUS at 13:49

## 2017-06-20 RX ADMIN — Medication 20 ML: at 18:15

## 2017-06-20 RX ADMIN — OXYCODONE HYDROCHLORIDE 30 MG: 15 TABLET ORAL at 21:04

## 2017-06-20 RX ADMIN — Medication 5 ML: at 04:02

## 2017-06-20 RX ADMIN — HEPARIN SODIUM 2000 UNITS: 200 INJECTION, SOLUTION INTRAVENOUS at 08:24

## 2017-06-20 RX ADMIN — FENTANYL CITRATE 50 MCG: 50 INJECTION, SOLUTION INTRAMUSCULAR; INTRAVENOUS at 13:45

## 2017-06-20 RX ADMIN — SODIUM CHLORIDE 100 ML/HR: 900 INJECTION, SOLUTION INTRAVENOUS at 02:43

## 2017-06-20 RX ADMIN — DEXTROAMPHETAMINE SACCHARATE, AMPHETAMINE ASPARTATE, DEXTROAMPHETAMINE SULFATE AND AMPHETAMINE SULFATE 30 MG: 3.75; 3.75; 3.75; 3.75 TABLET ORAL at 16:15

## 2017-06-20 RX ADMIN — LIDOCAINE HYDROCHLORIDE 7 ML: 40 SOLUTION TOPICAL at 13:58

## 2017-06-20 RX ADMIN — BACLOFEN 20 MG: 10 TABLET ORAL at 21:04

## 2017-06-20 RX ADMIN — MIRTAZAPINE 30 MG: 15 TABLET, FILM COATED ORAL at 21:04

## 2017-06-20 RX ADMIN — Medication 20 ML: at 20:18

## 2017-06-20 RX ADMIN — CYCLOPHOSPHAMIDE 150 MG: 50 CAPSULE ORAL at 21:03

## 2017-06-20 RX ADMIN — OXYCODONE HYDROCHLORIDE 30 MG: 15 TABLET ORAL at 16:31

## 2017-06-20 RX ADMIN — FENTANYL CITRATE 50 MCG: 50 INJECTION, SOLUTION INTRAMUSCULAR; INTRAVENOUS at 13:44

## 2017-06-20 RX ADMIN — FENTANYL CITRATE 50 MCG: 50 INJECTION, SOLUTION INTRAMUSCULAR; INTRAVENOUS at 13:39

## 2017-06-20 RX ADMIN — LIDOCAINE HYDROCHLORIDE 1 ML: 20 JELLY TOPICAL at 13:58

## 2017-06-20 RX ADMIN — CEFTRIAXONE 1 G: 1 INJECTION, POWDER, FOR SOLUTION INTRAMUSCULAR; INTRAVENOUS at 21:00

## 2017-06-20 RX ADMIN — AZITHROMYCIN MONOHYDRATE 500 MG: 500 INJECTION, POWDER, LYOPHILIZED, FOR SOLUTION INTRAVENOUS at 16:14

## 2017-06-20 RX ADMIN — GABAPENTIN 1200 MG: 400 CAPSULE ORAL at 21:04

## 2017-06-20 RX ADMIN — Medication 5 ML: at 16:19

## 2017-06-20 RX ADMIN — Medication 10 ML: at 21:14

## 2017-06-20 RX ADMIN — FENTANYL CITRATE 50 MCG: 50 INJECTION, SOLUTION INTRAMUSCULAR; INTRAVENOUS at 13:54

## 2017-06-20 RX ADMIN — BACLOFEN 20 MG: 10 TABLET ORAL at 16:15

## 2017-06-20 RX ADMIN — OXYCODONE HYDROCHLORIDE 30 MG: 15 TABLET ORAL at 04:01

## 2017-06-20 RX ADMIN — OXYCODONE HYDROCHLORIDE 30 MG: 15 TABLET ORAL at 10:36

## 2017-06-20 RX ADMIN — DIPHENHYDRAMINE HYDROCHLORIDE 25 MG: 25 CAPSULE ORAL at 17:12

## 2017-06-20 RX ADMIN — PANTOPRAZOLE SODIUM 40 MG: 40 TABLET, DELAYED RELEASE ORAL at 04:02

## 2017-06-20 RX ADMIN — MIDAZOLAM HYDROCHLORIDE 1 MG: 1 INJECTION, SOLUTION INTRAMUSCULAR; INTRAVENOUS at 13:42

## 2017-06-20 RX ADMIN — GABAPENTIN 1200 MG: 400 CAPSULE ORAL at 16:14

## 2017-06-20 NOTE — PROGRESS NOTES
Patient c/o generalized body pain, pain scale 8/10, prn oxycodone requested and provided @ this time. Patient request prn valium for anxiety and depression, given @ this time, will continue to monitor.

## 2017-06-20 NOTE — PROGRESS NOTES
Patient c/o generalized pain 8/10, prn pain medication provide upon request, prn valium requested for anxiety and depression, given @ this time.

## 2017-06-20 NOTE — ROUTINE PROCESS
TRANSFER - IN REPORT:    Verbal report received from [facility]  [facility] received from [phone] for ordered procedure      Report consisted of patients Situation, Background, Assessment and   Recommendations(SBAR). Information from the following report(s) SBAR and MAR was reviewed with the receiving nurse. Opportunity for questions and clarification was provided. Patient currently in IR and will be brought to bronch lab post procedure for TBBX. Talked to Imelda KAHN RN.

## 2017-06-20 NOTE — PROGRESS NOTES
TRANSFER - OUT REPORT:    Verbal report given to Corinna RN(name) on Marylou Cormier  being transferred to 803(unit) for ordered procedure       Report consisted of patients Situation, Background, Assessment and   Recommendations(SBAR). Information from the following report(s) Procedure Summary, MAR and Cardiac Rhythm Sinus Raynell Sheer as low as 37 was reviewed with the receiving nurse. Lines:   Peripheral IV 06/15/17 Left Arm (Active)   Site Assessment Clean, dry, & intact 6/20/2017  2:11 AM   Phlebitis Assessment 0 6/20/2017  2:11 AM   Infiltration Assessment 0 6/20/2017  2:11 AM   Dressing Status Clean, dry, & intact 6/20/2017  2:11 AM   Dressing Type Transparent 6/20/2017  2:11 AM   Hub Color/Line Status Infusing 6/20/2017  2:11 AM   Action Taken Open ports on tubing capped 6/19/2017  3:02 PM        Opportunity for questions and clarification was provided.       Patient transported with:   [employer]

## 2017-06-20 NOTE — PROGRESS NOTES
Mr. Kelly Brood in bed eating dinner. Sister at bedside. Without complaints or needs. Alert and oriented in all spheres. About to begin with apharesis. Telemetry monitor in place with NSR with rate 79. Continuous IVFs infusing. Without further needs at this time.  Call light in lap and door closed per request.

## 2017-06-20 NOTE — PROGRESS NOTES
Procedure:  Therapeutic plasma exchange  Dx: Good pasture syndrome  Day: 1 of TPE  Labs drawn: CBC, BMP  Calcium used:   4 grams  Replacement product: FFP  Replace volume: 3677   Remove volume: 3932  TBV processed:   100%  [employer] blood tubing lot # (if FFP used): [id number]  Issues: None  Next procedure date: 6/21/17  Labs needed: per protocol

## 2017-06-20 NOTE — ROUTINE PROCESS
30 mg oxycodone given in endo prep per Dr Carmita Briones with small sip water. .  Witnessed by Kathy Stone Rn. Pain 9/10.

## 2017-06-20 NOTE — PROGRESS NOTES
Patient resting in bed with eyes closed, family @ bedside, RR even and unlabored, IV fluids infusing, will continue to monitor.

## 2017-06-20 NOTE — PROCEDURES
Department of Interventional Radiology  [phone]        Interventional Radiology Brief Procedure Note    Patient: Ede Mccabe MRN: [id number]  SSN: [id number]    YOB: 1960  Age: 61 y.o.   Sex: male      Date of Procedure: 6/20/2017    Pre-Procedure Diagnosis: Goodpasture's syndrome    Post-Procedure Diagnosis: SAME    Procedure(s): Temporary Central Venous Catheter    Brief Description of Procedure: US, fluoro guided right IJ temp apheresis catheter placed    Performed By: Juan Diego Farias PA-C     Assistants: None    Anesthesia:Lidocaine    Estimated Blood Loss: None    Specimens: None    Implants: Temporary Venous Catheter    Findings: catheter tip in right atrium     Complications: None    Recommendations: ok to use catheter     Follow Up: referring MD    Signed By: Juan Diego Farias PA-C     June 20, 2017

## 2017-06-20 NOTE — PROGRESS NOTES
Patient resting in bed with eyes closed, family @ bedside, IV fluids infusing, IV intact, patient on RA with RR even and unlabored, remains NPO, medications given with small sip of water, no s/s of pain, call light within reach.

## 2017-06-20 NOTE — PROGRESS NOTES
Mr. Nilesh Sue resting in bed awake with sister at bedside. Without complaints at this time. States no pain or discomfort. Remains on room air without dyspnea; lungs diminished. Continuous IVfs infusing. 1+ edema noted to b/l lower extremities. Skin appears pale. NPO for bronchoscopy and tunneled catheter insertion today. Without further needs. Call light in lap and door open.

## 2017-06-20 NOTE — PROGRESS NOTES
Rosalene Merlin Adcox  Admission Date: 6/15/2017             Daily Progress Note: 6/20/2017    The patient's chart is reviewed and the patient is discussed with the staff. Pt is a 60 yo male with a history of tobacco abuse smoking < 1ppd x 10 years off and on before quitting 2014,Chronic depression, anxiety, ADD,Hepatitis C, BPH, and chronic pain. He had been feeling poorly, lost 40lbs over the last 3 months secondary to anorexia, chills, shortness of breath, and edema. He head a chest CT on 4/21/17 which revealed RLL 4.4 cm x 3.1 cm density. Had chest CT 5/8/17 with multiple large infiltrative nodules in R lung concerning for cavitation. Was seen 6/8 by Dr. Bhumika Paiz and started on Levaquin on 6/9. She was seen by Dr. Bhumika Paiz on 6/15/17 and had not improved and was hypoxic to 86% on RA with exertion. She was admitted for ongoing workup of cavitary lesion. His CRP elevated at 283. Anti GBM Ab +- initiated on Cyclophosphamide and pulse dose steroid 06/17/17. Nephrology consulted with no plans for renal bx at this time (normal renal function / no blood or microscopic protein in urine). Also seen by heme/Onc with plans for plasma pheresis. Subjective:     Pt sitting up in bed on RA. Pt is going to IR for temporary catheter placement now. Pt's son left a note at bedside noting that pt had a tick embedded in his back a few weeks ago which they dug out.      Current Facility-Administered Medications   Medication Dose Route Frequency    cyclophosphamide (CYTOXAN) chemo capsule 150 mg  150 mg Oral Q24H    dextroamphetamine-amphetamine (ADDERALL) tablet 30 mg  30 mg Oral TID    baclofen (LIORESAL) tablet 20 mg  20 mg Oral TID    DESVENLAFAXINE SUCCINATE (PRISTIQ PO) - patient supplied  100 mg Oral BID    doxazosin (CARDURA) tablet 4 mg  4 mg Oral DAILY    gabapentin (NEURONTIN) capsule 1,200 mg  1,200 mg Oral TID    lamoTRIgine (LaMICtal) tablet 100 mg  100 mg Oral DAILY    mirtazapine (REMERON) tablet 30 mg  30 mg Oral QHS    multivitamin, stress formula (STRESS TAB) tablet 1 Tab  1 Tab Oral DAILY    pantoprazole (PROTONIX) tablet 40 mg  40 mg Oral ACB    oxyCODONE IR (OXY-IR) immediate release tablet 30 mg  30 mg Oral Q4H PRN    diazePAM (VALIUM) tablet 10 mg  10 mg Oral Q4H PRN    sodium chloride (NS) flush 5-10 mL  5-10 mL IntraVENous Q8H    sodium chloride (NS) flush 5-10 mL  5-10 mL IntraVENous PRN    albuterol (PROVENTIL VENTOLIN) nebulizer solution 2.5 mg  2.5 mg Nebulization Q6H PRN    cefTRIAXone (ROCEPHIN) 1 g in 0.9% sodium chloride (MBP/ADV) 50 mL  1 g IntraVENous Q24H    azithromycin (ZITHROMAX) 500 mg in 0.9% sodium chloride (MBP/ADV) 250 mL  500 mg IntraVENous Q24H    acetaminophen (TYLENOL) tablet 650 mg  650 mg Oral Q4H PRN    naloxone (NARCAN) injection 0.4 mg  0.4 mg IntraVENous PRN    ondansetron (ZOFRAN) injection 4 mg  4 mg IntraVENous Q4H PRN    senna-docusate (PERICOLACE) 8.6-50 mg per tablet 1 Tab  1 Tab Oral BID PRN    0.9% sodium chloride infusion  100 mL/hr IntraVENous CONTINUOUS       Review of Systems  +cough  +pain  Constitutional: negative for fever, chills, sweats  Cardiovascular: negative for chest pain, palpitations, syncope, edema  Gastrointestinal:  negative for dysphagia, reflux, vomiting, diarrhea, abdominal pain, or melena  Neurologic:  negative for focal weakness, numbness, headache    Objective:     Vitals:    06/19/17 1937 06/19/17 2329 06/20/17 0355 06/20/17 0613   BP: 138/78 126/85 (!) 152/91    Pulse: 69 68 (!) 43    Resp: 18 18 18    Temp: 98 °F (36.7 °C) 97.3 °F (36.3 °C) 97.8 °F (36.6 °C)    SpO2: 94% 97% 92%    Weight:    168 lb 11.2 oz (76.5 kg)   Height:         Intake and Output:   06/18 1901 - 06/20 0700  In: 3500 [P.O.:1200;  I.V.:2300]  Out: -        Physical Exam:   Constitution:  the patient is well developed and in no acute distress, on RA  EENMT:  Sclera clear, pupils equal, oral mucosa moist  Respiratory: fairly clear  Cardiovascular:  RRR without M,G,R  Gastrointestinal: soft and non-tender; with positive bowel sounds. Musculoskeletal: warm without cyanosis. There is no lower leg edema. Skin:  no jaundice or rashes  Neurologic: no gross neuro deficits     Psychiatric:  alert and oriented x 3    CXR: none today                LAB  No results for input(s): GLUCPOC in the last 72 hours. No lab exists for component: [place]   Recent Labs      06/19/17   1548  06/18/17   0505   WBC  10.9  6.4   HGB  10.8*  10.6*   HCT  34.1*  34.1*   PLT  392  450   INR  1.2   --      Recent Labs      06/19/17   1548  06/19/17   0530  06/18/17   0505   NA   --   146*  146*   K   --   4.1  3.7   CL   --   111*  110*   CO2   --   27  25   GLU   --   80  152*   BUN   --   15  9   CREA   --   0.79*  0.82   MG  2.2   --    --    CA   --   9.1  9.1     No results for input(s): PH, PCO2, PO2, HCO3 in the last 72 hours. No results for input(s): LCAD, LAC in the last 72 hours.       Assessment:  (Medical Decision Making)     Hospital Problems  Date Reviewed: 6/20/2017          Codes Class Noted POA    Goodpasture syndrome (HCC)-on pulsed dose steroids, cyclophosphamide (2mg/kg/day)  Started 6/17, nephrology following ICD-10-CM: M31.0  ICD-9-CM: 446.21  6/17/2017 Yes        BPH (benign prostatic hyperplasia) (Chronic)-chronic  ICD-10-CM: N40.0  ICD-9-CM: 600.00  6/16/2017 Yes        Anxiety and depression (Chronic)-controlled on home medications ICD-10-CM: F41.9, F32.9  ICD-9-CM: 300.00, 311  6/15/2017 Yes        History of hepatitis C (Chronic)-chronic  ICD-10-CM: Z86.19  ICD-9-CM: V12.09  6/15/2017 Yes    Overview Signed 8/7/2012 10:55 AM by Mari Ortiz III     S/p treatment             Chronic shoulder pain (Chronic)-controlled ICD-10-CM: M25.519, G89.29  ICD-9-CM: 719.41, 338.29  6/15/2017 Yes        HLD (hyperlipidemia) (Chronic)-chronic  ICD-10-CM: E78.5  ICD-9-CM: 272.4  6/15/2017 Yes        * (Principal)Pulmonary cavitary lesion-needs navigational bronch/EBUS ICD-10-CM: J98.4  ICD-9-CM: 518.89  6/15/2017 Yes        Abnormal CXR-on zithromax/rocephin ICD-10-CM: R93.8  ICD-9-CM: 793.2  6/15/2017 Yes        Elevated C-reactive protein (CRP)-on pulsed dose steroids  ICD-10-CM: R79.82  ICD-9-CM: 790.95  6/15/2017 Yes              Plan:  (Medical Decision Making)     --on RA  --continue zithromax/rocephin day 6-plans to continue abx while on pulsed dose steroids, 1000mg daily  --on IVF  --for temporary tunneled catheter placement in preparation for plasmapheresis today  --on cytoxan  --awaiting navigational bronch, will be rescheduled from today. May be done as outpt. More than 50% of the time documented was spent in face-to-face contact with the patient and in the care of the patient on the floor/unit where the patient is located. USHA Stovall  Lungs: some crackles on the R  Heart:  RRR with no Murmur/Rubs/Gallops    Additional Comments: For bronch with tblb    I have spoken with and examined the patient. I agree with the above assessment and plan as documented.     Nakita Lozada MD

## 2017-06-20 NOTE — H&P
Date of Surgery Update:  Shreya Lynch was seen and examined. History and physical has been reviewed. The patient has been examined.  There have been no significant clinical changes since the completion of the originally dated History and Physical.    Signed By: Veronique Mckoy MD     June 20, 2017 2:02 PM

## 2017-06-20 NOTE — CONSULTS
[facility] Cardiology Consult    Attending Cardiologist:Dr. Gita Pierre    Primary Cardiologist:none    Primary Care Physician:Dr. Dee Dee Kim                 Referring--Dr. Reymundo Mendes    Subjective:     Casey Anderson is a 61 y.o. male with recent diagnosis of cavitary lesion in lung. Hx of HTN, Hep C, Depression, dyslipidemia, no prior CAD, no prior syncope or near syncope. He is in bronchoscopy lab this morning with noted bradycardia with HR in 30's. He is asymptomatic, denies dizziness, no syncope or near syncope in past. No family hx of SCD. Not on rate slowing medications.      Past Medical History:   Diagnosis Date    Anxiety and depression 8/7/2012    BPH (benign prostatic hyperplasia) 8/7/2012    Chronic shoulder pain 8/7/2012    Colon polyps     Hepatitis C     diagnosed 10/08    History of hepatitis C 8/7/2012    History of hepatitis C 8/7/2012    HLD (hyperlipidemia) 8/7/2012    Other ill-defined conditions     had abscess on lung; liver problems    Psychiatric disorder     depression; anxiety      Past Surgical History:   Procedure Laterality Date    HX ORTHOPAEDIC      shoulders x4    HX OTHER SURGICAL      abscess removed from bilateral lungs      Current Facility-Administered Medications   Medication Dose Route Frequency    sodium bicarbonate (NEUT) injection 1-2 mL  1-2 mL SubCUTAneous RAD ONCE    lidocaine-EPINEPHrine (PF) (XYLOCAINE) 2 %-1:200,000 injection  mg  2-20 mL SubCUTAneous ONCE    heparin (PF) 2 units/ml in NS infusion 2,000 Units  1,000 mL Irrigation Multiple    cyclophosphamide (CYTOXAN) chemo capsule 150 mg  150 mg Oral Q24H    dextroamphetamine-amphetamine (ADDERALL) tablet 30 mg  30 mg Oral TID    baclofen (LIORESAL) tablet 20 mg  20 mg Oral TID    DESVENLAFAXINE SUCCINATE (PRISTIQ PO) - patient supplied  100 mg Oral BID    doxazosin (CARDURA) tablet 4 mg  4 mg Oral DAILY    gabapentin (NEURONTIN) capsule 1,200 mg  1,200 mg Oral TID    lamoTRIgine (LaMICtal) tablet 100 mg  100 mg Oral DAILY    mirtazapine (REMERON) tablet 30 mg  30 mg Oral QHS    multivitamin, stress formula (STRESS TAB) tablet 1 Tab  1 Tab Oral DAILY    pantoprazole (PROTONIX) tablet 40 mg  40 mg Oral ACB    oxyCODONE IR (OXY-IR) immediate release tablet 30 mg  30 mg Oral Q4H PRN    diazePAM (VALIUM) tablet 10 mg  10 mg Oral Q4H PRN    sodium chloride (NS) flush 5-10 mL  5-10 mL IntraVENous Q8H    sodium chloride (NS) flush 5-10 mL  5-10 mL IntraVENous PRN    albuterol (PROVENTIL VENTOLIN) nebulizer solution 2.5 mg  2.5 mg Nebulization Q6H PRN    cefTRIAXone (ROCEPHIN) 1 g in 0.9% sodium chloride (MBP/ADV) 50 mL  1 g IntraVENous Q24H    azithromycin (ZITHROMAX) 500 mg in 0.9% sodium chloride (MBP/ADV) 250 mL  500 mg IntraVENous Q24H    acetaminophen (TYLENOL) tablet 650 mg  650 mg Oral Q4H PRN    naloxone (NARCAN) injection 0.4 mg  0.4 mg IntraVENous PRN    ondansetron (ZOFRAN) injection 4 mg  4 mg IntraVENous Q4H PRN    senna-docusate (PERICOLACE) 8.6-50 mg per tablet 1 Tab  1 Tab Oral BID PRN    0.9% sodium chloride infusion  100 mL/hr IntraVENous CONTINUOUS     No Known Allergies   Social History   Substance Use Topics    Smoking status: Former Smoker     Packs/day: 0.50     Years: 6.00     Quit date: 12/1/2014    Smokeless tobacco: Never Used      Comment: Quit 09/09 > 10pk Hx    Alcohol use No      Family History   Problem Relation Age of Onset    Lung Disease Father     Cancer Father      lung        Review of Systems  Gen: Denies fever, chills, malaise or fatigue. Appetite good. HEENT: Denies frequent headaches, dizzyness, visual disturbances, Neck pain or swallowing difficulty  Lungs: SOB as per pulmonary notes with new cavitary lesion.    Cardiovascular: Denies chest pain, orthopnea, PND, no syncope or near syncope  GI: Denies hememesis, dark tarry stools, No prior Hx of GI bleed, Denies constipation  : Denies dysuria, no complaints of frequency, nocturia  Heme: No prior bleeding disorders, no prior Cancer  Neuro: Denies prior CVA, TIA. Endocrine: no diabetes, thyroid disorders  Psychiatric: Denies anxiety, or other psychiatric illnesses. Objective:     Visit Vitals    /81    Pulse (!) 35    Temp 97.6 °F (36.4 °C)    Resp 14    Ht 5' 7\" (1.702 m)    Wt 76.5 kg (168 lb 11.2 oz)    SpO2 97%    BMI 26.42 kg/m2     General:Alert, cooperative, no distress, appears stated age  Head: Normocephalic, without obvious abnormality, atraumatic. Eyes: Conjunctivae/corneas clear. PERRL, EOMs intact  Nose:Nares normal. Septum midline. Mucosa normal. No drainage or sinus tenderness. Throat: Lips, mucosa, and tongue normal. Teeth and gums normal.   Neck: Supple, symmetrical, trachea midline,  no carotid bruit and no JVD. Lungs:Clear to auscultation bilaterally. Chest wall: No tenderness or deformity. Heart: Regular rate and rhythm, S1, S2 normal, no murmur, click, rub or gallop. Abdomen:Soft, non-tender. Bowel sounds normal. No masses, No organomegaly. Extremities: Extremities normal, atraumatic, no cyanosis or edema. Pulses: 2+ and symmetric all extremities.     Skin: Skin color, texture, turgor normal. No rashes or lesions  Lymph nodes: Cervical, supraclavicular, and axillary nodes normal  Neurologic:No focal deficits identified                 ECG: sinus bradycardia today, previous ECG on 6/15 with NSR    Data Review:     Recent Results (from the past 24 hour(s))   CBC WITH AUTOMATED DIFF    Collection Time: 06/19/17  3:48 PM   Result Value Ref Range    WBC 10.9 4.3 - 11.1 K/uL    RBC 4.18 (L) 4.23 - 5.67 M/uL    HGB 10.8 (L) 13.6 - 17.2 g/dL    HCT 34.1 (L) 41.1 - 50.3 %    MCV 81.6 79.6 - 97.8 FL    MCH 25.8 (L) 26.1 - 32.9 PG    MCHC 31.7 31.4 - 35.0 g/dL    RDW 17.5 (H) 11.9 - 14.6 %    PLATELET 964 367 - 590 K/uL    MPV 8.7 (L) 10.8 - 14.1 FL    NEUTROPHILS 81 (H) 43 - 78 %    LYMPHOCYTES 18 13 - 44 %    MONOCYTES 1 (L) 4.0 - 12.0 % EOSINOPHILS 0 (L) 0.5 - 7.8 %    BASOPHILS 0 0.0 - 2.0 %    IMMATURE GRANULOCYTES 0 0.0 - 5.0 %    ABS. NEUTROPHILS 8.8 (H) 1.7 - 8.2 K/UL    ABS. LYMPHOCYTES 2.0 0.5 - 4.6 K/UL    ABS. MONOCYTES 0.1 0.1 - 1.3 K/UL    ABS. EOSINOPHILS 0.0 0.0 - 0.8 K/UL    ABS. BASOPHILS 0.0 0.0 - 0.2 K/UL    ABS. IMM. GRANS. 0.0 0.0 - 0.5 K/UL    RBC COMMENTS MODERATE  ANISOCYTOSIS        RBC COMMENTS SLIGHT  POIKILOCYTOSIS        WBC COMMENTS OCCASIONAL      PLATELET COMMENTS ADEQUATE      DF AUTOMATED     MAGNESIUM    Collection Time: 06/19/17  3:48 PM   Result Value Ref Range    Magnesium 2.2 1.8 - 2.4 mg/dL   PTT    Collection Time: 06/19/17  3:48 PM   Result Value Ref Range    aPTT 28.2 23.5 - 31.7 SEC   PROTHROMBIN TIME + INR    Collection Time: 06/19/17  3:48 PM   Result Value Ref Range    Prothrombin time 13.3 (H) 9.6 - 12.0 sec    INR 1.2 0.9 - 1.2     FIBRINOGEN    Collection Time: 06/19/17  3:48 PM   Result Value Ref Range    Fibrinogen 376 172 - 437 mg/dL   EKG, 12 LEAD, INITIAL    Collection Time: 06/20/17  9:46 AM   Result Value Ref Range    Ventricular Rate 41 BPM    Atrial Rate 41 BPM    P-R Interval 112 ms    QRS Duration 98 ms    Q-T Interval 506 ms    QTC Calculation (Bezet) 417 ms    Calculated P Axis 38 degrees    Calculated R Axis 26 degrees    Calculated T Axis 36 degrees    Diagnosis       Marked sinus bradycardia  Abnormal ECG  When compared with ECG of 15-LAWSON-2017 14:11,  Vent. rate has decreased BY  48 BPM  Confirmed by Sudarshan Cheatham ([id number]) on 6/20/2017 11:07:08 AM           Assessment / Plan     Principal Problem:    Pulmonary cavitary lesion (6/15/2017)--ok to proceed with bronchoscopy, will need remote telemetry monitoring. Active Problems:    Bradycardia (6/20/2017)- asymptomatic, does demonstrate some chronotropic response with leg lifts in bed to HR 50's although blunted. OK to proceed with bronchoscopy, remote telemetry, echo, avoid rate slowing meds.          Anxiety and depression (6/15/2017)      History of hepatitis C (6/15/2017)      Overview: S/p treatment      Chronic shoulder pain (6/15/2017)      HLD (hyperlipidemia) (6/15/2017)      BPH (benign prostatic hyperplasia) (6/16/2017)      Abnormal CXR (6/15/2017)      Elevated C-reactive protein (CRP) (6/15/2017)      Goodpasture syndrome ([address] (6/17/2017)      Joshua Valdez NP     ATTENDING ADDENDUM:    Patient seen and examined by me. Agree with above note by physician extender. Key findings are:  No CP or JOHNSTON, no CHF, palpitations, presyncope or syncope. euvolemic and asymptomatic from a CV standpoint. Sinus maria luisa to 40's in pre-bronch lab, clinically asymptomatic and not taking any rate slowing meds. CV- RRR without murmur  Lungs- Clear bilaterally  Abd- soft, nontender, nondistended  Ext- no edema    Plan: As above. HR increases to upper 50's/low 60's with leg exercises in bed. Probably benign maria luisa today, proceed with bronch, use DA gtt as needed to increase HR perioperatively, avoid hypoxia if possible. Tele continuous post procedure on floor until discharge, consider E-Cardio at discharge as well, but at present he is a very poor pacemaker candidate and clinically asymptomatic.      Lv Nation MD  [facility] Cardiology  Pager [phone]

## 2017-06-20 NOTE — ROUTINE PROCESS
Dr Patricia Raya notified that once arrived to [facility] HR 32-41. Order for 12 lead EKG and cardiology consult per Dr Patricia Raya. Patient in no distress at this time. Corinna COTE aware.

## 2017-06-20 NOTE — PROCEDURES
PROCEDURE    Bronchoscopy with airway inspection/cleanout/BAL. TBBX/    INDICATION   RUL cavitary lesion    EQUIPMENT:    Olympus Q 180 Bronchhoscope. ANESTHESIA    Concious sedation with: Fentanyl 300 mcg IV; Versed 4 mg IV; Lidocaine 160 mg to tracheo-bronchial tree and vocal cords;  . AIRWAY INSPECTION    After obtaining informed consent, using a bite block/ET tube adapter, an Olympus q 180 video/fiberoptic bronchoscope was  introduced into the trachea through the vocal chords/, without complication. RIGHT    LOCATION NORM/ABNORM DESCRIPTION   VOCAL CORDS  White exudate false cords   TRACHEA NL    KIARA     RMSB  Diffuse acute and chronic inflammation with edema, erythema, and longitudinal striations   RUL     BI  Bal RUL ant. segment   RML     SUP SEGM RLL     MED BASAL  tblb x 4 RUL - 1 anterior, 2 apical. 1 posterior with fluoro   ANTERIOR BASAL     LATERAL BASAL     POSTERIOR BASAL                                               LEFT    LOCATION NORMAL/ABNORMAL TYPE   LMSB     CARL     LINGULA     SUPERIOR DIVISION     SUPERIOR SEG LLL     VESTA-MEDIAL LLL     LATERAL LLL     POSTERIOR LLL       The following samples were obtained:    BAL:    TBBX:    Samples were sent for:  Cultures , cytology, histology  The procedure was completed  without complication and the patient tolerated the procedure well. Estimated blood loss-  0 to minimum  Recommendations:   Follow up results  Fluoro post test -noptx, ultrasound + lung slide  Joao Mancera MD

## 2017-06-20 NOTE — ROUTINE PROCESS
TRANSFER - OUT REPORT:    Verbal report given to [facility] RN on [employer]  for routine progression of care       Report consisted of patients Situation, Background, Assessment and   Recommendations(SBAR). Information from the following report(s) Procedure Summary was reviewed with the receiving nurse. Lines:   Peripheral IV 06/15/17 Left Arm (Active)   Site Assessment Clean, dry, & intact 6/20/2017 12:34 PM   Phlebitis Assessment 0 6/20/2017 12:34 PM   Infiltration Assessment 0 6/20/2017 12:34 PM   Dressing Status Clean, dry, & intact 6/20/2017 12:34 PM   Dressing Type Tape;Transparent 6/20/2017 12:34 PM   Hub Color/Line Status Infusing 6/20/2017  2:11 AM   Action Taken Open ports on tubing capped 6/19/2017  3:02 PM        Opportunity for questions and clarification was provided. RN will place remote tele monitor on patient upon return to room 803.

## 2017-06-20 NOTE — PROGRESS NOTES
Mr. Mateo Morocho returned from bronchoscopy. Alert, oriented in all spheres. Sister remains at bedside. Lungs remain diminished on room air. Without complaints. Right trialysis catheter intact with clean dressing; no bleeding or hematoma. Continuous IVFs restarted and telemetry monitor placed; sinus bradycardia rate 48. NPO until 1600. Will monitor with call light in lap and door open.

## 2017-06-20 NOTE — PROGRESS NOTES
Patient is alert and oriented X3, sitting up in bed HOB elevated, IV antibiotics and NS infusing, IV intact, patient denies pain and discomfort, denies sob and dyspnea, ble edema present, will continue to monitor.

## 2017-06-20 NOTE — ROUTINE PROCESS
EKG reviewed by Dr Jeffrey Amor. Order to place patient on remote telemetry prior to return to room. Ok to give scheduled 30 mg Oxycodone for pain per Dr Jeffrey Amor. Pharmacy aware.

## 2017-06-21 ENCOUNTER — ANESTHESIA EVENT (OUTPATIENT)
Dept: ENDOSCOPY | Age: 57
DRG: 987 | End: 2017-06-21
Payer: MEDICARE

## 2017-06-21 LAB
ALBUMIN SERPL BCP-MCNC: 2.6 G/DL (ref 3.5–5)
ALBUMIN/GLOB SERPL: 0.8 {RATIO} (ref 1.2–3.5)
ALP SERPL-CCNC: 94 U/L (ref 50–136)
ALT SERPL-CCNC: 38 U/L (ref 12–65)
ANION GAP BLD CALC-SCNC: 4 MMOL/L (ref 7–16)
APTT PPP: 26.6 SEC (ref 23.5–31.7)
AST SERPL W P-5'-P-CCNC: 47 U/L (ref 15–37)
BASOPHILS # BLD AUTO: 0 K/UL (ref 0–0.2)
BASOPHILS # BLD: 0 % (ref 0–2)
BILIRUB DIRECT SERPL-MCNC: <0.1 MG/DL
BILIRUB SERPL-MCNC: 0.2 MG/DL (ref 0.2–1.1)
BLD PROD TYP BPU: NORMAL
BPU ID: NORMAL
BUN SERPL-MCNC: 19 MG/DL (ref 6–23)
CALCIUM SERPL-MCNC: 8.3 MG/DL (ref 8.3–10.4)
CHLORIDE SERPL-SCNC: 109 MMOL/L (ref 98–107)
CO2 SERPL-SCNC: 34 MMOL/L (ref 21–32)
CREAT SERPL-MCNC: 0.97 MG/DL (ref 0.8–1.5)
DIFFERENTIAL METHOD BLD: ABNORMAL
EOSINOPHIL # BLD: 0 K/UL (ref 0–0.8)
EOSINOPHIL NFR BLD: 0 % (ref 0.5–7.8)
ERYTHROCYTE [DISTWIDTH] IN BLOOD BY AUTOMATED COUNT: 18.4 % (ref 11.9–14.6)
FIBRINOGEN PPP-MCNC: 265 MG/DL (ref 172–437)
GBM IGG SER-ACNC: 54 UNITS (ref 0–20)
GLOBULIN SER CALC-MCNC: 3.2 G/DL (ref 2.3–3.5)
GLUCOSE SERPL-MCNC: 79 MG/DL (ref 65–100)
HCT VFR BLD AUTO: 36.6 % (ref 41.1–50.3)
HGB BLD-MCNC: 11.3 G/DL (ref 13.6–17.2)
IMM GRANULOCYTES # BLD: 0.1 K/UL (ref 0–0.5)
IMM GRANULOCYTES NFR BLD AUTO: 0.6 % (ref 0–5)
INR PPP: 1.1 (ref 0.9–1.2)
LYMPHOCYTES # BLD AUTO: 35 % (ref 13–44)
LYMPHOCYTES # BLD: 4.1 K/UL (ref 0.5–4.6)
MAGNESIUM SERPL-MCNC: 2.1 MG/DL (ref 1.8–2.4)
MCH RBC QN AUTO: 25.2 PG (ref 26.1–32.9)
MCHC RBC AUTO-ENTMCNC: 30.9 G/DL (ref 31.4–35)
MCV RBC AUTO: 81.7 FL (ref 79.6–97.8)
MONOCYTES # BLD: 0.7 K/UL (ref 0.1–1.3)
MONOCYTES NFR BLD AUTO: 6 % (ref 4–12)
NEUTS SEG # BLD: 6.9 K/UL (ref 1.7–8.2)
NEUTS SEG NFR BLD AUTO: 58 % (ref 43–78)
PLATELET # BLD AUTO: 369 K/UL (ref 150–450)
PMV BLD AUTO: 8.9 FL (ref 10.8–14.1)
POTASSIUM SERPL-SCNC: 3.6 MMOL/L (ref 3.5–5.1)
PROT SERPL-MCNC: 5.8 G/DL (ref 6.3–8.2)
PROTHROMBIN TIME: 12.2 SEC (ref 9.6–12)
RBC # BLD AUTO: 4.48 M/UL (ref 4.23–5.67)
SODIUM SERPL-SCNC: 147 MMOL/L (ref 136–145)
STATUS OF UNIT,%ST: NORMAL
UNIT DIVISION, %UDIV: 0
UNIT DIVISION, %UDIV: NORMAL
WBC # BLD AUTO: 11.7 K/UL (ref 4.3–11.1)

## 2017-06-21 PROCEDURE — 92610 EVALUATE SWALLOWING FUNCTION: CPT

## 2017-06-21 PROCEDURE — 74011250637 HC RX REV CODE- 250/637: Performed by: INTERNAL MEDICINE

## 2017-06-21 PROCEDURE — 83735 ASSAY OF MAGNESIUM: CPT | Performed by: INTERNAL MEDICINE

## 2017-06-21 PROCEDURE — 36430 TRANSFUSION BLD/BLD COMPNT: CPT

## 2017-06-21 PROCEDURE — 74011250636 HC RX REV CODE- 250/636: Performed by: PHYSICIAN ASSISTANT

## 2017-06-21 PROCEDURE — 74011250637 HC RX REV CODE- 250/637: Performed by: PHYSICIAN ASSISTANT

## 2017-06-21 PROCEDURE — 74011250636 HC RX REV CODE- 250/636: Performed by: INTERNAL MEDICINE

## 2017-06-21 PROCEDURE — 85384 FIBRINOGEN ACTIVITY: CPT | Performed by: INTERNAL MEDICINE

## 2017-06-21 PROCEDURE — 80076 HEPATIC FUNCTION PANEL: CPT | Performed by: INTERNAL MEDICINE

## 2017-06-21 PROCEDURE — 80048 BASIC METABOLIC PNL TOTAL CA: CPT | Performed by: INTERNAL MEDICINE

## 2017-06-21 PROCEDURE — C8929 TTE W OR WO FOL WCON,DOPPLER: HCPCS

## 2017-06-21 PROCEDURE — 85610 PROTHROMBIN TIME: CPT | Performed by: INTERNAL MEDICINE

## 2017-06-21 PROCEDURE — 36514 APHERESIS PLASMA: CPT

## 2017-06-21 PROCEDURE — 65270000029 HC RM PRIVATE

## 2017-06-21 PROCEDURE — 87522 HEPATITIS C REVRS TRNSCRPJ: CPT | Performed by: INTERNAL MEDICINE

## 2017-06-21 PROCEDURE — 74011000258 HC RX REV CODE- 258: Performed by: PHYSICIAN ASSISTANT

## 2017-06-21 PROCEDURE — 85025 COMPLETE CBC W/AUTO DIFF WBC: CPT | Performed by: INTERNAL MEDICINE

## 2017-06-21 PROCEDURE — 86480 TB TEST CELL IMMUN MEASURE: CPT | Performed by: NURSE PRACTITIONER

## 2017-06-21 PROCEDURE — 74011000250 HC RX REV CODE- 250: Performed by: INTERNAL MEDICINE

## 2017-06-21 PROCEDURE — 99232 SBSQ HOSP IP/OBS MODERATE 35: CPT | Performed by: INTERNAL MEDICINE

## 2017-06-21 PROCEDURE — P9059 PLASMA, FRZ BETWEEN 8-24HOUR: HCPCS | Performed by: NURSE PRACTITIONER

## 2017-06-21 PROCEDURE — 36415 COLL VENOUS BLD VENIPUNCTURE: CPT | Performed by: NURSE PRACTITIONER

## 2017-06-21 PROCEDURE — 85730 THROMBOPLASTIN TIME PARTIAL: CPT | Performed by: INTERNAL MEDICINE

## 2017-06-21 RX ORDER — ACETAMINOPHEN 325 MG/1
650 TABLET ORAL ONCE
Status: DISCONTINUED | OUTPATIENT
Start: 2017-06-21 | End: 2017-06-21

## 2017-06-21 RX ORDER — SODIUM CHLORIDE 0.9 % (FLUSH) 0.9 %
5-10 SYRINGE (ML) INJECTION AS NEEDED
Status: CANCELLED | OUTPATIENT
Start: 2017-06-21

## 2017-06-21 RX ORDER — SODIUM CHLORIDE, SODIUM LACTATE, POTASSIUM CHLORIDE, CALCIUM CHLORIDE 600; 310; 30; 20 MG/100ML; MG/100ML; MG/100ML; MG/100ML
100 INJECTION, SOLUTION INTRAVENOUS CONTINUOUS
Status: CANCELLED | OUTPATIENT
Start: 2017-06-21

## 2017-06-21 RX ORDER — DIPHENHYDRAMINE HCL 25 MG
25 CAPSULE ORAL ONCE
Status: DISCONTINUED | OUTPATIENT
Start: 2017-06-21 | End: 2017-06-21

## 2017-06-21 RX ADMIN — Medication 10 ML: at 05:44

## 2017-06-21 RX ADMIN — OXYCODONE HYDROCHLORIDE 30 MG: 15 TABLET ORAL at 05:45

## 2017-06-21 RX ADMIN — DIPHENHYDRAMINE HYDROCHLORIDE 25 MG: 25 CAPSULE ORAL at 10:27

## 2017-06-21 RX ADMIN — Medication 10 ML: at 14:25

## 2017-06-21 RX ADMIN — DESVENLAFAXINE 100 MG: 100 TABLET, EXTENDED RELEASE ORAL at 16:01

## 2017-06-21 RX ADMIN — OXYCODONE HYDROCHLORIDE 30 MG: 15 TABLET ORAL at 14:20

## 2017-06-21 RX ADMIN — CYCLOPHOSPHAMIDE 150 MG: 50 CAPSULE ORAL at 20:42

## 2017-06-21 RX ADMIN — OXYCODONE HYDROCHLORIDE 30 MG: 15 TABLET ORAL at 20:39

## 2017-06-21 RX ADMIN — B-COMPLEX W/ C & FOLIC ACID TAB 1 TABLET: TAB at 09:52

## 2017-06-21 RX ADMIN — OXYCODONE HYDROCHLORIDE 30 MG: 15 TABLET ORAL at 01:19

## 2017-06-21 RX ADMIN — Medication 10 ML: at 14:00

## 2017-06-21 RX ADMIN — DOXAZOSIN 4 MG: 4 TABLET ORAL at 09:52

## 2017-06-21 RX ADMIN — LAMOTRIGINE 100 MG: 100 TABLET ORAL at 09:52

## 2017-06-21 RX ADMIN — DIAZEPAM 10 MG: 5 TABLET ORAL at 14:25

## 2017-06-21 RX ADMIN — DIAZEPAM 10 MG: 5 TABLET ORAL at 01:23

## 2017-06-21 RX ADMIN — GABAPENTIN 1200 MG: 400 CAPSULE ORAL at 20:39

## 2017-06-21 RX ADMIN — CALCIUM GLUCONATE: 94 INJECTION, SOLUTION INTRAVENOUS at 12:05

## 2017-06-21 RX ADMIN — DEXTROAMPHETAMINE SACCHARATE, AMPHETAMINE ASPARTATE, DEXTROAMPHETAMINE SULFATE AND AMPHETAMINE SULFATE 30 MG: 3.75; 3.75; 3.75; 3.75 TABLET ORAL at 20:39

## 2017-06-21 RX ADMIN — BACLOFEN 20 MG: 10 TABLET ORAL at 09:52

## 2017-06-21 RX ADMIN — OXYCODONE HYDROCHLORIDE 30 MG: 15 TABLET ORAL at 09:52

## 2017-06-21 RX ADMIN — DESVENLAFAXINE 100 MG: 100 TABLET, EXTENDED RELEASE ORAL at 09:55

## 2017-06-21 RX ADMIN — PANTOPRAZOLE SODIUM 40 MG: 40 TABLET, DELAYED RELEASE ORAL at 05:44

## 2017-06-21 RX ADMIN — Medication 10 ML: at 20:42

## 2017-06-21 RX ADMIN — Medication 5 ML: at 14:00

## 2017-06-21 RX ADMIN — DEXTROAMPHETAMINE SACCHARATE, AMPHETAMINE ASPARTATE, DEXTROAMPHETAMINE SULFATE AND AMPHETAMINE SULFATE 30 MG: 3.75; 3.75; 3.75; 3.75 TABLET ORAL at 09:52

## 2017-06-21 RX ADMIN — GABAPENTIN 1200 MG: 400 CAPSULE ORAL at 15:51

## 2017-06-21 RX ADMIN — CEFTRIAXONE 1 G: 1 INJECTION, POWDER, FOR SOLUTION INTRAMUSCULAR; INTRAVENOUS at 15:52

## 2017-06-21 RX ADMIN — Medication 10 ML: at 14:26

## 2017-06-21 RX ADMIN — AZITHROMYCIN MONOHYDRATE 500 MG: 500 INJECTION, POWDER, LYOPHILIZED, FOR SOLUTION INTRAVENOUS at 15:52

## 2017-06-21 RX ADMIN — ACETAMINOPHEN 650 MG: 325 TABLET ORAL at 10:27

## 2017-06-21 RX ADMIN — PERFLUTREN 1 ML: 6.52 INJECTION, SUSPENSION INTRAVENOUS at 09:00

## 2017-06-21 RX ADMIN — MIRTAZAPINE 30 MG: 15 TABLET, FILM COATED ORAL at 20:39

## 2017-06-21 RX ADMIN — DEXTROAMPHETAMINE SACCHARATE, AMPHETAMINE ASPARTATE, DEXTROAMPHETAMINE SULFATE AND AMPHETAMINE SULFATE 30 MG: 3.75; 3.75; 3.75; 3.75 TABLET ORAL at 15:51

## 2017-06-21 RX ADMIN — DIAZEPAM 10 MG: 5 TABLET ORAL at 09:52

## 2017-06-21 RX ADMIN — Medication 10 ML: at 20:40

## 2017-06-21 RX ADMIN — BACLOFEN 20 MG: 10 TABLET ORAL at 20:39

## 2017-06-21 RX ADMIN — GABAPENTIN 1200 MG: 400 CAPSULE ORAL at 09:52

## 2017-06-21 RX ADMIN — BACLOFEN 20 MG: 10 TABLET ORAL at 15:51

## 2017-06-21 RX ADMIN — Medication 10 ML: at 05:45

## 2017-06-21 NOTE — PROGRESS NOTES
Inpatient Hematology / Oncology Progress Note      Admission Date: 6/15/2017 11:50 AM  Reason for Admission/Hospital Course: Lung mass [R91.8]  Dysphagia, unspecified [R13.10]  Esophageal stricture [K22.2]    24 Hour Events:  Afebrile. Bradycardic yesterday but now improved. Seen by cardiology  Bronch showed white exudate false cords and diffuse inflammation, edema, erythema. BAL done and pending  Feeling well today - still weak  Up walking around in room      ROS:  Constitutional: Negative for fever, chills, weakness, malaise, fatigue. CV: Positive for BLE edema; negative for chest pain, palpitations. Respiratory: Negative for dyspnea, cough, wheezing. GI: Negative for nausea, abdominal pain, diarrhea. 10 point review of systems is otherwise negative with the exception of the elements mentioned above in the HPI. No Known Allergies    OBJECTIVE:  Patient Vitals for the past 8 hrs:   BP Temp Pulse Resp SpO2   17 1321 101/70 97.2 °F (36.2 °C) 71 16 96 %   17 1314 100/68 - 70 16 94 %   17 1255 93/62 - 75 16 94 %   17 1232 97/66 98.3 °F (36.8 °C) 66 16 93 %   17 1202 109/60 - 85 16 92 %   17 1055 101/70 98.1 °F (36.7 °C) 60 20 91 %   17 0722 106/63 97.7 °F (36.5 °C) 71 20 93 %   17 0557 109/72 - (!) 46 - -     Temp (24hrs), Av.8 °F (36.6 °C), Min:97.2 °F (36.2 °C), Max:98.4 °F (36.9 °C)     0701 -  1900  In: 0041 [P.O.:840]  Out: -     Physical Exam:  Constitutional: Well developed, well nourished male in no acute distress, lying comfortably in the hospital bed. HEENT: Normocephalic and atraumatic. Oropharynx is clear, mucous membranes are moist. Sclerae anicteric. Neck supple    Skin Warm and dry. No bruising and no rash noted. No erythema. No pallor. Respiratory Lungs are clear to auscultation bilaterally without wheezes, rales or rhonchi, normal air exchange without accessory muscle use.     CVS Normal rate, regular rhythm and normal S1 and S2. No murmurs, gallops, or rubs. Abdomen RUQ tenderness with palpation-he states is chronic. Soft and nondistended, normoactive bowel sounds. No palpable mass. Neuro Grossly nonfocal with no obvious sensory or motor deficits. MSK Normal range of motion in general.  No edema and no tenderness. Psych Appropriate mood and affect. Right neck temp apheresis cath with small old blood noted to dressing    Labs:      Recent Labs      06/21/17   0550  06/20/17   1512  06/19/17   1548   WBC  11.7*  10.3  10.9   RBC  4.48  4.11*  4.18*   HGB  11.3*  10.5*  10.8*   HCT  36.6*  33.6*  34.1*   MCV  81.7  81.8  81.6   MCH  25.2*  25.5*  25.8*   MCHC  30.9*  31.3*  31.7   RDW  18.4*  18.0*  17.5*   PLT  369  415  392   GRANS  58  58  81*   LYMPH  35  36  18   MONOS  6  6  1*   EOS  0*  0*  0*   BASOS  0  0  0   IG  0.6  0  0   DF  AUTOMATED  AUTOMATED  AUTOMATED   ANEU  6.9  6.0  8.8*   ABL  4.1  3.7  2.0   ABM  0.7  0.6  0.1   YEMI  0.0  0.0  0.0   ABB  0.0  0.0  0.0   AIG  0.1  0.0  0.0        Recent Labs      06/21/17   0550  06/20/17   1512  06/19/17   1548  06/19/17   0530   NA  147*  146*   --   146*   K  3.6  3.5   --   4.1   CL  109*  109*   --   111*   CO2  34*  30   --   27   AGAP  4*  7   --   8   GLU  79  75   --   80   BUN  19  15   --   15   CREA  0.97  0.78*   --   0.79*   GFRAA  >60  >60   --   >60   GFRNA  >60  >60   --   >60   CA  8.3  8.8   --   9.1   SGOT  47*   --    --    --    AP  94   --    --    --    TP  5.8*   --    --    --    ALB  2.6*   --    --    --    GLOB  3.2   --    --    --    AGRAT  0.8*   --    --    --    MG  2.1   --   2.2   --          Imaging:  CT Chest 6/15/17  HISTORY: Lower extremities pain and swelling. Pulmonary infiltrates.     EXAM: Contiguous axial CT images were obtained from the thoracic inlet to the  upper abdomen following the uneventful IV administration of 100 mL Isovue-370.   Coronal reformations were performed.     Radiation dose reduction techniques were used for this study. All CT scans  performed at this facility use one or all of the following: Automated exposure  control, adjustment of the mA and/or kVp according to patient's size, and  iterative reconstruction.     PRIOR STUDY: 05/08/2017     FINDINGS: There is no evidence of pulmonary embolus through the subsegmental  level bilaterally. The main pulmonary artery is normal in size. There are  multiple mildly prominent mediastinal lymph nodes with a representative right  paratracheal lymph node measuring 18 x 10 mm. There are nonenlarged hilar lymph  nodes bilaterally. There is no pericardial effusion.     There has been progressive consolidation and volume loss in the right upper lobe  with areas of air bronchograms as well as multiple cavitary foci. The  consolidative and cavitary process in the right lower lobe has also mildly  enlarged, now measuring 5.6 x 2.5 cm compared with 4.2 x 2.5 cm previously. There is a new 6 mm irregular, nodular density (image 72) located just anterior  and inferior to this larger process. There are minor groundglass opacities in  the left lower lobe, new from the prior exam. There is a background of  centrilobular emphysema. There is no pleural effusion. Several tiny nodular  densities in the right lung base (images 93-96) have developed since the prior  exam.     There are subcentimeter hypodensities in the left hepatic lobe. Several  calcified gallstones are present in the gallbladder. The remaining included  upper abdominal organs are unremarkable. There are no acute or concerning bony  lesions.     IMPRESSION  IMPRESSION:      1. Progressive consolidation and volume loss in the right upper lobe with new  areas of cavitation. The consolidative cavitary process in the right lower lobe  has also mildly enlarged, and several new irregular and groundglass nodules have  developed bilaterally.  Findings are suggestive of an infectious process,  possibly fungal.     2. No evidence of pulmonary embolus.     3. Mildly prominent mediastinal lymph nodes which are likely reactive.     4. Cholelithiasis. ASSESSMENT:    Problem List  Date Reviewed: 6/21/2017          Codes Class Noted    Bradycardia ICD-10-CM: R00.1  ICD-9-CM: 427.89  6/20/2017        Severe protein-calorie malnutrition (HCC) (Chronic) ICD-10-CM: E43  ICD-9-CM: 262  6/20/2017        Goodpasture syndrome ([address] ICD-10-CM: M31.0  ICD-9-CM: 446.21  6/17/2017        BPH (benign prostatic hyperplasia) (Chronic) ICD-10-CM: N40.0  ICD-9-CM: 600.00  6/16/2017        Anxiety and depression (Chronic) ICD-10-CM: F41.9, F32.9  ICD-9-CM: 300.00, 311  6/15/2017        History of hepatitis C (Chronic) ICD-10-CM: Z86.19  ICD-9-CM: V12.09  6/15/2017    Overview Signed 8/7/2012 10:55 AM by Marylou Olvera III     S/p treatment             Chronic shoulder pain (Chronic) ICD-10-CM: M25.519, G89.29  ICD-9-CM: 719.41, 338.29  6/15/2017        HLD (hyperlipidemia) (Chronic) ICD-10-CM: E78.5  ICD-9-CM: 272.4  6/15/2017        * (Principal)Pulmonary cavitary lesion ICD-10-CM: J98.4  ICD-9-CM: 518.89  6/15/2017        Abnormal CXR ICD-10-CM: R93.8  ICD-9-CM: 793.2  6/15/2017        Elevated C-reactive protein (CRP) ICD-10-CM: M09.42  ICD-9-CM: 790.95  6/15/2017                PLAN:  Goodpasture syndrome   6/19: Mr Keen was informed of the apheresis process and purpose and is agreeable  IR was consulted today for temp cath placement. Once line is placed we will start plasmapheresis with the plan of daily for 5 days and then every other day. GBM antibody was 75 on admission. Per Pulmonary, he is scheduled for Bronch/EBUS tomorrow. He will continue on steroids and cytoxan.   6/21 S/p bronch yesterday and found white exudate false cords and diffuse inflammation, edema, erythema. BAL done and pending. Day 2 PLEX today. Plan to repeat levels in 1 week.  Continue steroids/cytoxan        Steven Garcia NP   [facility] Hematology & Oncology  [address] [place] [address] Avenue  Office : [phone]  Fax : [phone]

## 2017-06-21 NOTE — PROGRESS NOTES
[facility] CARDIOLOGY PROGRESS NOTE           6/21/2017 8:02 AM    Admit Date: 6/15/2017      Subjective:   No c/o CP, dizziness or syncope. VSS. ROS:  Cardiovascular:  As noted above    Objective:      Vitals:    06/21/17 0358 06/21/17 0423 06/21/17 0557 06/21/17 0722   BP: 117/75  109/72 106/63   Pulse: 86  (!) 46 71   Resp: 20   20   Temp: 97.8 °F (36.6 °C)   97.7 °F (36.5 °C)   SpO2: 91%   93%   Weight:  77.5 kg (170 lb 12.8 oz)     Height:           Physical Exam:  General-No Acute Distress  Neck- supple, no JVD  CV- regular rate and rhythm no MRG  Lung- clear bilaterally  Abd- soft, nontender, nondistended  Ext- no edema bilaterally. Skin- warm and dry    Data Review:   Recent Labs      06/21/17   0550  06/20/17   1512  06/19/17   1548   NA  147*  146*   --    K  3.6  3.5   --    MG   --    --   2.2   BUN  19  15   --    CREA  0.97  0.78*   --    GLU  79  75   --    WBC  11.7*  10.3  10.9   HGB  11.3*  10.5*  10.8*   HCT  36.6*  33.6*  34.1*   PLT  369  415  392   INR  1.1  1.2  1.2       Assessment/Plan:       Pulmonary cavitary lesion (6/15/2017)--s/p bronch yesterday     Active Problems:    Bradycardia (6/20/2017)- HR better this am, currently 71. Lowest recorded HR overnight 46. No c/o dizziness or syncope. Will need 14 day tele monitor with follow up at discharge. Continue remote tele while inpatient. Will sign off. Please contact us if further needs arise.        Sabina Cervantes NP  6/21/2017 8:02 AM

## 2017-06-21 NOTE — CONSULTS
Infectious Disease Consult    Today's Date: 6/21/2017   Admit Date: 6/15/2017    Impression:   · Tick Bite: occurred 1-2 months ago and was not associated with any febrile illness. Very unlikely to be related to patient's pulmonary findings and anti-GBM serology. Would not pursue any workup related to this  · Cavitary Lung Disease: s/p Bronch yesterday; patient without history of TB exposure but would get quantiferon and check HIV serology  · HCV: patient recalls being treated several years ago with daily interferon and states all testing done afterwards was negative; would confirm with HCV PCR; last HCV PCR in computer from 12/20/2009 and VL was 22,000. · Anti-GBM Disease    Plan:   ·  Check HIV, HCV VL, TB Quantiferon  · Check serum cryptococcal antigen; galactomannan and fungitell  · Okay with continuation of antibiotic for 7 days; would discontinue after last dose today    Anti-infectives:     Inpat Anti-Infectives     Start     Ordered Stop    06/15/17 1500  cefTRIAXone (ROCEPHIN) 1 g in 0.9% sodium chloride (MBP/ADV) 50 mL  1 g,   IntraVENous,   EVERY 24 HOURS      06/15/17 1408 --    06/15/17 1500  azithromycin (ZITHROMAX) 500 mg in 0.9% sodium chloride (MBP/ADV) 250 mL  500 mg,   IntraVENous,   EVERY 24 HOURS      06/15/17 1408 --          Subjective:   Date of Consultation:  June 21, 2017  Referring Physician: Alka Aguayo    Patient is a 61 y.o. male admitted 06/15/17 with progressive RLL disease (cavitary), weight loss and progressive [place] and LE edema. Patient initially evaluated 2 months prior to admission when he had a CT abdomen/pelvis done on 04/21/17 for f/u of cirrhosis of the liver which demonstrated RLL density 4.4x3.1cm. A f/u Chest CT was ordered on 04/26 and done on 05/08 and revealed multiple large infiltrative nodules in the right lung with suggested early cavitation are more likely infectious and neoplastic.    He was seen for routine f/u by his PMD, Dr. Franky Swenson on 06/08/17 and reported a 40lb weight loss and per her note \"is scheduled for a bronch later this month\". At the time of his visit he also had noted marked LE swelling with oozing of fluid from his toes. Dr. Samantha Hearn discussed case on 06/09 with Dr. Meme Pena who recommend Levaquin 750mg po every day for 10 days. Patient contacted 06/14 by phone and still reported severe weakness and malaise and so returned to see Dr. Samantha Hearn on 06/15 at which time he reported increasing weaking and fatigue along with increasing SOB. Labs done 06/08 showed ; BC x 2 negative and WBC 16.4. Patient referred for admission after being found to be orthostatic. On admission a CT chest revealed progression of the RLL process with new areas of cavitation. Patient empirically placed on IV Ceftriaxone and Azithromycin and several labs ordered; including anti-GBM jesse which came back positive on 06/17. Patient then start on pulse dose steroids (solumedrol 1 gm IV every day for 3 days) and cyclophosphamide. He was seen in consult by nephrology and hem/onc and a plasmaphoresis catheter was placed 06/19. He underwent BAL 06/20 with all studies pending. Since receiving the steroids the patient feels 100% improved with increased energy and appetite. Patient's family gave history of removing an embedded tick from his back about 2 months ago and so ID consulted. Patient has remained on Ceftriaxone and azithromycin.      Patient Active Problem List   Diagnosis Code    Anxiety and depression F41.9, F32.9    History of hepatitis C Z86.19    Chronic shoulder pain M25.519, G89.29    HLD (hyperlipidemia) E78.5    BPH (benign prostatic hyperplasia) N40.0    Pulmonary cavitary lesion J98.4    Abnormal CXR R93.8    Elevated C-reactive protein (CRP) R79.82    Goodpasture syndrome (HCC) M31.0    Bradycardia R00.1    Severe protein-calorie malnutrition (HCC) E43     Past Medical History:   Diagnosis Date    Anxiety and depression 8/7/2012    BPH (benign prostatic hyperplasia) 8/7/2012    Chronic shoulder pain 8/7/2012    Colon polyps     Hepatitis C     diagnosed 10/08    History of hepatitis C 8/7/2012    History of hepatitis C 8/7/2012    HLD (hyperlipidemia) 8/7/2012    Other ill-defined conditions     had abscess on lung; liver problems    Psychiatric disorder     depression; anxiety      Family History   Problem Relation Age of Onset    Lung Disease Father     Cancer Father      lung      Social History   Substance Use Topics    Smoking status: Former Smoker     Packs/day: 0.50     Years: 6.00     Quit date: 12/1/2014    Smokeless tobacco: Never Used      Comment: Quit 09/09 > 10pk Hx    Alcohol use No     Past Surgical History:   Procedure Laterality Date    HX ORTHOPAEDIC      shoulders x4    HX OTHER SURGICAL      abscess removed from bilateral lungs      Prior to Admission medications    Medication Sig Start Date End Date Taking? Authorizing Provider   oxyCODONE IR (OXY-IR) 30 mg immediate release tablet Take 1 Tab by mouth every four (4) hours as needed for Pain. 7/8/17  Yes Dee Dee Kim MD   lamoTRIgine (LAMICTAL) 100 mg tablet TK 1 T PO Q DAY 11/13/16  Yes Historical Provider   baclofen (LIORESAL) 20 mg tablet Take 1 Tab by mouth three (3) times daily. 12/1/16  Yes Dee Dee Fuentes MD   gabapentin (NEURONTIN) 600 mg tablet Take 2 Tabs by mouth three (3) times daily. 12/1/16  Yes Dee Dee Fuentes MD   amphetamine-dextroamphetamine (ADDERALL) 30 mg tablet Take 30 mg by mouth three (3) times daily. Yes Historical Provider   DESVENLAFAXINE SUCCINATE (PRISTIQ PO) Take 100 mg by mouth daily. Yes Destini Altamirano MD   VALIUM 10 mg Tab Take 10 mg by mouth every four (4) hours as needed. Yes Historical Provider   levoFLOXacin (LEVAQUIN) 750 mg tablet Take 1 Tab by mouth daily. 6/9/17   Dee Dee Fuentes MD   doxazosin (CARDURA) 4 mg tablet Take 1 Tab by mouth daily.  Indications: SYMPTOMATIC BENIGN PROSTATIC HYPERPLASIA 6/8/17   Dee Dee Kim MD   omeprazole (PRILOSEC) 40 mg capsule Take 1 Cap by mouth daily. 3/1/17   April Won Serna MD   mirtazapine (REMERON) 30 mg tablet 2 tabs po qhs    Historical Provider   multivitamin, stress formula (STRESS TAB) tablet Take 1 Tab by mouth daily. Historical Provider       No Known Allergies     Review of Systems:  A comprehensive review of systems was negative except for that written in the History of Present Illness. Objective:     Visit Vitals    /70 (BP 1 Location: Left arm, BP Patient Position: Head of bed elevated (Comment degrees))    Pulse 60    Temp 98.1 °F (36.7 °C)    Resp 20    Ht 5' 7\" (1.702 m)    Wt 77.5 kg (170 lb 12.8 oz)    SpO2 91%    BMI 26.75 kg/m2     Temp (24hrs), Av.9 °F (36.6 °C), Min:97.5 °F (36.4 °C), Max:98.4 °F (36.9 °C)       Lines:  Central Venous Catheter:            Physical Exam:    General:  Alert, cooperative, well noursished, well developed, appears stated age   Eyes:  Sclera anicteric. Pupils equally round and reactive to light. Mouth/Throat: Mucous membranes normal, oral pharynx clear, edentulous   Neck: Supple   Lungs:   Clear to auscultation bilaterally, good effort   CV:  Regular rate and rhythm,no murmur, click, rub or gallop   Abdomen:   Soft, non-tender.  bowel sounds normal. non-distended   Extremities: No cyanosis or edema   Skin: Skin color, texture, turgor normal. no acute rash or lesions; back area without any evidence of previous tick bite, no specific site identified   Lymph nodes: Cervical and supraclavicular normal   Musculoskeletal: No swelling or deformity   Lines/Devices:  Intact, no erythema, drainage or tenderness   Psych: Alert and oriented, normal mood affect given the setting         Data Review:     CBC:Recent Labs      17   0550  17   1512  17   1548   WBC  11.7*  10.3  10.9   GRANS  58  58  81*   MONOS  6  6  1*   EOS  0*  0*  0*   ANEU  6.9  6.0  8.8*   ABL  4.1  3.7  2.0   HGB  11.3*  10.5*  10.8*   HCT  36.6*  33.6*  34.1* PLT  369  415  392       BMP:  Recent Labs      06/21/17   0550  06/20/17   1512  06/19/17   0530   CREA  0.97  0.78*  0.79*   BUN  19  15  15   NA  147*  146*  146*   K  3.6  3.5  4.1   CL  109*  109*  111*   CO2  34*  30  27   AGAP  4*  7  8   GLU  79  75  80       LFTS:  Recent Labs      06/21/17   0550   TBILI  0.2   ALT  38   SGOT  47*   AP  94   TP  5.8*   ALB  2.6*       Microbiology:     All Micro Results     Procedure Component Value Units Date/Time    QUANTIFERON TB GOLD [[id number]]     Order Status:  Sent Specimen:  Whole Blood from Blood     CULTURE, RESPIRATORY/SPUTUM/BRONCH Cleta Cheeks STAIN [[id number]] Collected:  06/20/17 1410    Order Status:  Completed Specimen:  Sputum from Bronchial lavage Updated:  06/21/17 1028     Special Requests: NO SPECIAL REQUESTS        GRAM STAIN PENDING     Culture result: NO GROWTH 1 DAY       ANAEROBIC CULTURE - ONLY PERFORMED ON BRONCHIAL BRUSHING [[id number]] Collected:  06/20/17 1410    Order Status:  Completed Specimen:  Bronchial lavage Updated:  06/21/17 0827     Special Requests: NO SPECIAL REQUESTS        Culture result:         NO ANAEROBIC GROWTH IN 1 DAY    RESPIRATORY VIRAL PANEL, PCR [[id number]] Collected:  06/20/17 1410    Order Status:  Completed Updated:  06/20/17 1620    PNEUMOCYSTIS JIROVECI - PCR [[id number]] Collected:  06/20/17 1410    Order Status:  Completed Updated:  06/20/17 1620    CMV BY PCR, QT, BAL [[id number]] Collected:  06/20/17 1410    Order Status:  Completed Updated:  06/20/17 1555    FUNGUS CULTURE AND SMEAR - Fernando Solum [[id number]] Collected:  06/20/17 1410    Order Status:  Completed Specimen:  Other Updated:  06/20/17 1548    PNEUMOCYSTIS JIROVECI - PCR [[id number]] Collected:  06/20/17 1410    Order Status:  Completed Updated:  06/20/17 1544    HSV W/ TYPING: CULTURE [[id number]] Collected:  06/20/17 1410    Order Status:  Completed Updated:  06/20/17 1543    CYTOMEGALOVIRUS (CMV) CULTURE [[id number]] Collected:  06/20/17 1410 Order Status:  Completed Specimen:  Blood Updated:  06/20/17 1542    AFB (MYCOBACTERIUM) CULTURE & SMEAR W/REFLEX ID - Nya Valarie [[id number]] Collected:  06/20/17 1410    Order Status:  Completed Updated:  06/20/17 1539    ADENOVIRUS BY PCR, BAL [[id number]] Collected:  06/20/17 1410    Order Status:  Completed Updated:  06/20/17 1539    A. GALACTOMANNAN BY EIA, BRONCHOALVEOLAR LAVAGE [[id number]] Collected:  06/20/17 1410    Order Status:  Completed Updated:  06/20/17 1539    ATYPICAL PNEUMONIA BY PCR,BRONCHOALVEOLAR LAVAGE - INCLUDES: MYCOPLASMA PNEUMONIAE AND CHLAMYDIA PNEUMONIAE [[id number]] Collected:  06/20/17 1410    Order Status:  Completed Updated:  06/20/17 1539    CMV BY PCR, QT [[id number]] Collected:  06/20/17 1410    Order Status:  Canceled Specimen:  Blood Updated:  06/20/17 1539    L. PNEUMOPHILA BY Ronal Singh [[id number]] Collected:  06/20/17 1410    Order Status:  Completed Updated:  06/20/17 1539    FUNGUS CULTURE AND SMEAR - Tyesha Haji [[id number]] Collected:  06/20/17 1410    Order Status:  Completed Specimen:  Other Updated:  06/20/17 1509    CULTURE, BLOOD [[id number]] Collected:  06/15/17 1458    Order Status:  Completed Specimen:  Blood from Blood Updated:  06/20/17 0830     Special Requests: LEFT ANTECUBITAL        Culture result: NO GROWTH 5 DAYS       CULTURE, BLOOD [[id number]] Collected:  06/15/17 1500    Order Status:  Completed Specimen:  Blood from Blood Updated:  06/20/17 0830     Special Requests: RIGHT ANTECUBITAL        Culture result: NO GROWTH 5 DAYS             Imaging:   Chest CT (06/15/17): IMPRESSION  IMPRESSION:      1. Progressive consolidation and volume loss in the right upper lobe with new  areas of cavitation. The consolidative cavitary process in the right lower lobe  has also mildly enlarged, and several new irregular and groundglass nodules have  developed bilaterally. Findings are suggestive of an infectious process,  possibly fungal.     2.  No evidence of pulmonary embolus.     3. Mildly prominent mediastinal lymph nodes which are likely reactive.     4. Cholelithiasis.     Signed By: Ashley Carrington MD     June 21, 2017

## 2017-06-21 NOTE — PROGRESS NOTES
Bedside report received from [place], [facility].

## 2017-06-21 NOTE — PROGRESS NOTES
Procedure:  Therapeutic plasma exchange  Dx: Good pasture syndrome  Day: 2 of 5 ( daily, then every other day)  Labs drawn: CBC, CMP,PTT, FIBRINOGEN  Calcium used:   4 grams  Replacement product: FFP  Replace volume: 3778  Remove volume: 4035  TBV processed:   100%  TPV 1.2  [facility] (if FFP used): [id number]  Issues: None  Next procedure date: 6/22/17  Labs needed: per protocol    Report given to cathi Goldman RN

## 2017-06-21 NOTE — PROGRESS NOTES
Carmen Saenz Adcox  Admission Date: 6/15/2017             Daily Progress Note: 6/21/2017    The patient's chart is reviewed and the patient is discussed with the staff. 62 yo male presents with chills, shortness of breath, and edema. CT chest 4/21/17 revealed RLL 4.4 cm x 3.1cm density. Had chest CT 5/8/17 with multiple large infiltrative nodules in R lung concerning for cavitation. Was seen 6/8 by Dr. Semaj Pollock and started on Levaquin on 6/9. Was seen by Dr. Semaj Pollock the morning of admission, was not any better,hypoxic on RA, and was referred for admission / ongoing workup of cavitary lesion. History of smoking <1 ppd x 10 years off and on, quit in 2014. CRP elevated at 283. Anti GBM Ab +- initiated on Cyclophosphamide and pulse dose steroid 06/17/17. Nephrology consulted with no plans for renal bx at this time (normal renal function / no blood or microscopic protein in urine). Also seen by heme/Onc with plans for plasma pheresis. Chronic depression, anxiety, ADD,Hepatitis C, BPH, and chronic pain. Has been feeling poorly, lost 40lbs over 3 months secondary to anorexia. Weaned to room air. Subjective:      Sitting on side of bed, remains on room air and denies shortness of breath. Has occasional cough but no sputum production. Bronch yesterday.     Current Facility-Administered Medications   Medication Dose Route Frequency    heparin (PF) 2 units/ml in NS infusion 2,000 Units  1,000 mL Irrigation Multiple    sodium chloride (NS) flush 5-10 mL  5-10 mL IntraVENous Q8H    sodium chloride (NS) flush 5-10 mL  5-10 mL IntraVENous PRN    lidocaine (XYLOCAINE) 2 % jelly   Mucous Membrane PRN    acetaminophen (TYLENOL) tablet 650 mg  650 mg Oral Q4H PRN    diphenhydrAMINE (BENADRYL) capsule 25 mg  25 mg Oral Q6H PRN    cyclophosphamide (CYTOXAN) chemo capsule 150 mg  150 mg Oral Q24H    dextroamphetamine-amphetamine (ADDERALL) tablet 30 mg  30 mg Oral TID    baclofen (LIORESAL) tablet 20 mg  20 mg Oral TID    DESVENLAFAXINE SUCCINATE (PRISTIQ PO) - patient supplied  100 mg Oral BID    doxazosin (CARDURA) tablet 4 mg  4 mg Oral DAILY    gabapentin (NEURONTIN) capsule 1,200 mg  1,200 mg Oral TID    lamoTRIgine (LaMICtal) tablet 100 mg  100 mg Oral DAILY    mirtazapine (REMERON) tablet 30 mg  30 mg Oral QHS    multivitamin, stress formula (STRESS TAB) tablet 1 Tab  1 Tab Oral DAILY    pantoprazole (PROTONIX) tablet 40 mg  40 mg Oral ACB    oxyCODONE IR (OXY-IR) immediate release tablet 30 mg  30 mg Oral Q4H PRN    diazePAM (VALIUM) tablet 10 mg  10 mg Oral Q4H PRN    sodium chloride (NS) flush 5-10 mL  5-10 mL IntraVENous Q8H    sodium chloride (NS) flush 5-10 mL  5-10 mL IntraVENous PRN    albuterol (PROVENTIL VENTOLIN) nebulizer solution 2.5 mg  2.5 mg Nebulization Q6H PRN    cefTRIAXone (ROCEPHIN) 1 g in 0.9% sodium chloride (MBP/ADV) 50 mL  1 g IntraVENous Q24H    azithromycin (ZITHROMAX) 500 mg in 0.9% sodium chloride (MBP/ADV) 250 mL  500 mg IntraVENous Q24H    acetaminophen (TYLENOL) tablet 650 mg  650 mg Oral Q4H PRN    naloxone (NARCAN) injection 0.4 mg  0.4 mg IntraVENous PRN    ondansetron (ZOFRAN) injection 4 mg  4 mg IntraVENous Q4H PRN    senna-docusate (PERICOLACE) 8.6-50 mg per tablet 1 Tab  1 Tab Oral BID PRN    0.9% sodium chloride infusion  100 mL/hr IntraVENous CONTINUOUS       Review of Systems  Constitutional: negative for fever, chills, sweats  Cardiovascular: trace LE edema, negative for chest pain, palpitations, syncope  Gastrointestinal:  Coughing with liquid intake, negative for dysphagia, reflux, vomiting, diarrhea, abdominal pain, or melena  Neurologic:  negative for focal weakness, numbness, headache    Objective:     Vitals:    06/21/17 0358 06/21/17 0423 06/21/17 0557 06/21/17 0722   BP: 117/75  109/72 106/63   Pulse: 86  (!) 46 71   Resp: 20   20   Temp: 97.8 °F (36.6 °C)   97.7 °F (36.5 °C)   SpO2: 91%   93%   Weight:  170 lb 12.8 oz (77.5 kg)     Height: Intake and Output:   06/19 1901 - 06/21 0700  In: 7931 [P.O.:540; I.V.:1521]  Out: 3932   06/21 0701 - 06/21 1900  In: 480 [P.O.:480]  Out: -     Physical Exam:   Constitution:  the patient is well developed and in no acute distress, room air  EENMT:  Sclera clear, pupils equal, oral mucosa moist  Respiratory: diminished, clear to auscultation bilaterally, no sputum production  Cardiovascular:  RRR without M,G,R  Gastrointestinal: soft and non-tender; with positive bowel sounds. Musculoskeletal: warm without cyanosis. There is no lower leg edema. Skin:  no jaundice or rashes, no open wounds   Neurologic: no gross neuro deficits     Psychiatric:  alert and oriented x 3    CHEST XRAY: None today    Chest CT 6/15/17:                LAB   Recent Labs      06/21/17   0550  06/20/17   1512  06/19/17   1548   WBC  11.7*  10.3  10.9   HGB  11.3*  10.5*  10.8*   HCT  36.6*  33.6*  34.1*   PLT  369  415  392   INR  1.1  1.2  1.2     Recent Labs      06/21/17   0550  06/20/17   1512  06/19/17   1548  06/19/17   0530   NA  147*  146*   --   146*   K  3.6  3.5   --   4.1   CL  109*  109*   --   111*   CO2  34*  30   --   27   GLU  79  75   --   80   BUN  19  15   --   15   CREA  0.97  0.78*   --   0.79*   MG   --    --   2.2   --    CA  8.3  8.8   --   9.1     No results for input(s): PH, PCO2, PO2, HCO3 in the last 72 hours. No results for input(s): LCAD, LAC in the last 72 hours.       Assessment:  (Medical Decision Making)     Patient Active Problem List   Diagnosis Code    Anxiety and depression F41.9, F32.9    History of hepatitis C Z86.19    Chronic shoulder pain M25.519, G89.29    HLD (hyperlipidemia) E78.5    BPH (benign prostatic hyperplasia) N40.0    Pulmonary cavitary lesion J98.4    Abnormal CXR R93.8    Elevated C-reactive protein (CRP) R79.82    Goodpasture syndrome (HCC) M31.0    Bradycardia R00.1    Severe protein-calorie malnutrition (HCC) E43         Plan:  (Medical Decision Making) Hospital Problems  Date Reviewed: 6/21/2017          Codes Class Noted POA    BPH (benign prostatic hyperplasia) (Chronic) ICD-10-CM: N40.0  ICD-9-CM: 600.00  6/16/2017 Yes    chronic    * (Principal)Pulmonary cavitary lesion ICD-10-CM: J98.4  ICD-9-CM: 518.89  6/15/2017 Yes    Louise Gildardo / EBUS scheduled for 6/20  Continue abx   CRP and anti-GBM ab elevated      Abnormal CXR ICD-10-CM: R93.8  ICD-9-CM: 793.2  6/15/2017 Yes    With cavitary lesions    Elevated C-reactive protein (CRP) ICD-10-CM: R79.82  ICD-9-CM: 790.95  6/15/2017 Yes    Burst dose steroids completed    Anxiety and depression (Chronic) ICD-10-CM: F41.9, F32.9  ICD-9-CM: 300.00, 311  8/7/2012 Yes    Chronic--taking Valium      History of hepatitis C ICD-10-CM: Z86.19  ICD-9-CM: V12.09  8/7/2012 Yes     S/p treatment         Chronic shoulder pain (Chronic) ICD-10-CM: M25.519, G89.29  ICD-9-CM: 719.41, 338.29  8/7/2012 Yes    Chronic pain    HLD (hyperlipidemia) (Chronic) ICD-10-CM: E78.5  ICD-9-CM: 272.4  8/7/2012 Yes    chronic       --anti-GBM ab:  Glomerular Basement Membrane Ab 75 (H) 0 - 20 Final   --Zithromax, Rocephin day 7--continue till seen by ID  --Repeat blood cultures 6/15 BC: No growth-final  --NS at 100 ml/hr  --Likely Goodpasteur's syndrome    pulse dose steroids and cyclophosphamide(2mh/kg/day) - started 6/17, completed 6/19    UA without microscopic protein or RBC. --Heme/Onc  Plasma exchange yesterday for Anti-GBM Ab for possible 2-3 weeks-catheter placed yesterday. On Cytoxan  --Cardiology consulted for bradycardia-felt to be benign, ECHO pending today  --Bronch 6/20: White exudate false cords, diffuse inflammation, edema, erythema. BAL pending  --Family reported was found with an embedded tick on him in April. No apparent lesion seen. --Consult ID for assistance with medical management  --Getting \"strangled when I drink liquids\". Has been seen by GI in the past and had esophageal dilation.   Consult speech for evaluation--may need GI for repeat dilation. More than 50% of the time documented was spent in face-to-face contact with the patient and in the care of the patient on the floor/unit where the patient is located. Shira Condon NP     Lungs:  Decreased BS  Heart:  RRR with no Murmur/Rubs/Gallops    Additional Comments: Await bronch studies. Breathing OK at present with no sputum or blood. ST eval just beginning. Apparently found to have tick embedded in skin. ? Relationship to current pulmonary process. Will ask ID opinion. I have spoken with and examined the patient. I agree with the above assessment and plan as documented.     Alleen Lennox., MD

## 2017-06-21 NOTE — PROGRESS NOTES
Patient stable with no complaints at this time. Patient resting in bed. Call light within reach.   Report to be given to oncoming RN 7p-7a

## 2017-06-21 NOTE — CONSULTS
[facility] Consult Note       Primary GI Physician: Dr Marion Villatoro    Referring Physician:  Dr Blaine Schwab    Consult Date:  6/21/2017    Admit Date:  6/15/2017    Chief Complaint:  dysphagia    Subjective:     History of Present Illness:  Patient is a 61 y.o. male with PMH of depression, cirrhosis, anxiety, ADD, Hep C (s/p Pegasys tx), BPH and chronic pain , who is seen in consultation at the request of Dr. Blaine Schwab for dysphagia. Pt admitted 6/15 due to wt loss, anorexia, hills, sob and edema. He had abd CT 4/21/17 which revealed RLL 4.4 cm x 3.1cm density. He had chest CT 5/8/17 which revealed multiple large infiltrative nodules in R lung concerning for cavitation. Pt was admitted by pulmonary team and started on O2 and abx. 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 Pt had bronchoscopy with airway inspection and cleanout on 6/20 as well. Bronch studies still pending. Mentioned to Pulmonary team regarding dysphagia and feeling \"strangled\" when drinking liquids and some foods. He had an EGD with dilation recently. Speech saw him this morning who stated he was not a risk for aspiration and suggested thin liquids and soft diet. When speaking to the pt he states he had an EGD with dilation (in our records this was in Oct 2016, pt did have esophageal stricture -dilated with 47 and 57fr savary, spastic esophagus and tortuous esophagus, with esophageal lesion(bx neg for CA), portal HTN gastropathy and a prepyloric gastric ulcer). He states he felt great for one week after procedure but sx returned shortly after. He does wear dentures and has difficulty with them as well. Alma Delia Gonzalez has to regurgitate his food back up. Occas odynophagia. On Omeprazole 40mg every day and denies GERD sx.     PMH:  Past Medical History:   Diagnosis Date    Anxiety and depression 8/7/2012    BPH (benign prostatic hyperplasia) 8/7/2012    Chronic shoulder pain 8/7/2012    Colon polyps     Hepatitis C     diagnosed 10/08    History of hepatitis C 8/7/2012    History of hepatitis C 8/7/2012    HLD (hyperlipidemia) 8/7/2012    Other ill-defined conditions     had abscess on lung; liver problems    Psychiatric disorder     depression; anxiety       PSH:  Past Surgical History:   Procedure Laterality Date    HX ORTHOPAEDIC      shoulders x4    HX OTHER SURGICAL      abscess removed from bilateral lungs       Allergies:  No Known Allergies    Home Medications:  Prior to Admission medications    Medication Sig Start Date End Date Taking? Authorizing Provider   oxyCODONE IR (OXY-IR) 30 mg immediate release tablet Take 1 Tab by mouth every four (4) hours as needed for Pain. 7/8/17  Yes Dee Dee Kim MD   lamoTRIgine (LAMICTAL) 100 mg tablet TK 1 T PO Q DAY 11/13/16  Yes Historical Provider   baclofen (LIORESAL) 20 mg tablet Take 1 Tab by mouth three (3) times daily. 12/1/16  Yes Dee Dee Jiménez MD   gabapentin (NEURONTIN) 600 mg tablet Take 2 Tabs by mouth three (3) times daily. 12/1/16  Yes Dee Dee Jiménez MD   amphetamine-dextroamphetamine (ADDERALL) 30 mg tablet Take 30 mg by mouth three (3) times daily. Yes Historical Provider   DESVENLAFAXINE SUCCINATE (PRISTIQ PO) Take 100 mg by mouth daily. Yes Destini Altamirano MD   VALIUM 10 mg Tab Take 10 mg by mouth every four (4) hours as needed. Yes Historical Provider   levoFLOXacin (LEVAQUIN) 750 mg tablet Take 1 Tab by mouth daily. 6/9/17   Dee Dee Jiménez MD   doxazosin (CARDURA) 4 mg tablet Take 1 Tab by mouth daily. Indications: SYMPTOMATIC BENIGN PROSTATIC HYPERPLASIA 6/8/17   Dee Dee Kim MD   omeprazole (PRILOSEC) 40 mg capsule Take 1 Cap by mouth daily. 3/1/17   Dee Dee Jiménez MD   mirtazapine (REMERON) 30 mg tablet 2 tabs po qhs    Historical Provider   multivitamin, stress formula (STRESS TAB) tablet Take 1 Tab by mouth daily.     Historical Provider       Hospital Medications:  Current Facility-Administered Medications   Medication Dose Route Frequency    0.9% sodium chloride 250 mL with calcium gluconate 4 g infusion   IntraVENous ONCE    perflutren lipid microspheres (DEFINITY) in NS bolus IV  1 mL IntraVENous PRN    heparin (PF) 2 units/ml in NS infusion 2,000 Units  1,000 mL Irrigation Multiple    sodium chloride (NS) flush 5-10 mL  5-10 mL IntraVENous Q8H    sodium chloride (NS) flush 5-10 mL  5-10 mL IntraVENous PRN    lidocaine (XYLOCAINE) 2 % jelly   Mucous Membrane PRN    acetaminophen (TYLENOL) tablet 650 mg  650 mg Oral Q4H PRN    diphenhydrAMINE (BENADRYL) capsule 25 mg  25 mg Oral Q6H PRN    cyclophosphamide (CYTOXAN) chemo capsule 150 mg  150 mg Oral Q24H    dextroamphetamine-amphetamine (ADDERALL) tablet 30 mg  30 mg Oral TID    baclofen (LIORESAL) tablet 20 mg  20 mg Oral TID    DESVENLAFAXINE SUCCINATE (PRISTIQ PO) - patient supplied  100 mg Oral BID    doxazosin (CARDURA) tablet 4 mg  4 mg Oral DAILY    gabapentin (NEURONTIN) capsule 1,200 mg  1,200 mg Oral TID    lamoTRIgine (LaMICtal) tablet 100 mg  100 mg Oral DAILY    mirtazapine (REMERON) tablet 30 mg  30 mg Oral QHS    multivitamin, stress formula (STRESS TAB) tablet 1 Tab  1 Tab Oral DAILY    pantoprazole (PROTONIX) tablet 40 mg  40 mg Oral ACB    oxyCODONE IR (OXY-IR) immediate release tablet 30 mg  30 mg Oral Q4H PRN    diazePAM (VALIUM) tablet 10 mg  10 mg Oral Q4H PRN    sodium chloride (NS) flush 5-10 mL  5-10 mL IntraVENous Q8H    sodium chloride (NS) flush 5-10 mL  5-10 mL IntraVENous PRN    albuterol (PROVENTIL VENTOLIN) nebulizer solution 2.5 mg  2.5 mg Nebulization Q6H PRN    cefTRIAXone (ROCEPHIN) 1 g in 0.9% sodium chloride (MBP/ADV) 50 mL  1 g IntraVENous Q24H    azithromycin (ZITHROMAX) 500 mg in 0.9% sodium chloride (MBP/ADV) 250 mL  500 mg IntraVENous Q24H    acetaminophen (TYLENOL) tablet 650 mg  650 mg Oral Q4H PRN    naloxone (NARCAN) injection 0.4 mg  0.4 mg IntraVENous PRN  ondansetron (ZOFRAN) injection 4 mg  4 mg IntraVENous Q4H PRN    senna-docusate (PERICOLACE) 8.6-50 mg per tablet 1 Tab  1 Tab Oral BID PRN    0.9% sodium chloride infusion  100 mL/hr IntraVENous CONTINUOUS       Social History:  Social History   Substance Use Topics    Smoking status: Former Smoker     Packs/day: 0.50     Years: 6.00     Quit date: 12/1/2014    Smokeless tobacco: Never Used      Comment: Quit 09/09 > 10pk Hx    Alcohol use No       Pt denies any history of drug use, blood transfusions, or tattoos. Family History:  Family History   Problem Relation Age of Onset    Lung Disease Father     Cancer Father      lung       Review of Systems:  A detailed 10 system ROS is obtained, with pertinent positives as listed above. All others are negative. Diet:  Mechanical soft    Objective:     Physical Exam:  Vitals:  Visit Vitals    /70 (BP 1 Location: Left arm, BP Patient Position: Head of bed elevated (Comment degrees))    Pulse 60    Temp 98.1 °F (36.7 °C)    Resp 20    Ht 5' 7\" (1.702 m)    Wt 77.5 kg (170 lb 12.8 oz)    SpO2 91%    BMI 26.75 kg/m2     Gen:  Pt is alert, cooperative, no acute distress  Cardiovascular: Regular rate and rhythm. No murmurs, gallops, or rubs. Respiratory:  Comfortable breathing with no accessory muscle use. Clear breath sounds anteriorly with no wheezes, rales, or rhonchi. GI:  Abdomen nondistended, soft, and nontender. Normal active bowel sounds. No enlargement of the liver or spleen. No masses palpable. Rectal:  Deferred  Musculoskeletal:  No pitting edema of the lower legs. Neurological:  Gross memory appears intact. Patient is alert and oriented. Psychiatric:  Mood appears appropriate with judgement intact.     Laboratory:    Recent Labs      06/21/17   0550  06/20/17   1512  06/19/17   1548  06/19/17   0530   WBC  11.7*  10.3  10.9   --    HGB  11.3*  10.5*  10.8*   --    HCT  36.6*  33.6*  34.1*   --    PLT  369  415  392   --    MCV 81.7  81.8  81.6   --    NA  147*  146*   --   146*   K  3.6  3.5   --   4.1   CL  109*  109*   --   111*   CO2  34*  30   --   27   BUN  19  15   --   15   CREA  0.97  0.78*   --   0.79*   CA  8.3  8.8   --   9.1   MG  2.1   --   2.2   --    GLU  79  75   --   80   AP  94   --    --    --    SGOT  47*   --    --    --    ALT  38   --    --    --    TBILI  0.2   --    --    --    CBIL  <0.1   --    --    --    ALB  2.6*   --    --    --    TP  5.8*   --    --    --    PTP  12.2*  13.0*  13.3*   --    INR  1.1  1.2  1.2   --    APTT   --   26.8  28.2   --           Assessment:     Principal Problem:    Pulmonary cavitary lesion (6/15/2017)    Active Problems:    Anxiety and depression (6/15/2017)      History of hepatitis C (6/15/2017)      Overview: S/p treatment      Chronic shoulder pain (6/15/2017)      HLD (hyperlipidemia) (6/15/2017)      BPH (benign prostatic hyperplasia) (6/16/2017)      Abnormal CXR (6/15/2017)      Elevated C-reactive protein (CRP) (6/15/2017)      Goodpasture syndrome ([address] (6/17/2017)      Bradycardia (6/20/2017)      Severe protein-calorie malnutrition ([address] (6/20/2017)    61year old male with history of R lung cavitation was admitted 6/15 due to sob, edema, anorexia, weakness and wt loss. Was found to have Goodpastures syndrome and was put on steroids and started on plasmapheresis. Pt had a bronchoscopy 6/20 for inspection and cleanout. Pt mentions hes having dysphagia to solids and liquids and had a recent EGD with dilation this year. Speech evaluated pt and states he is not at risk for aspiration and suggested soft diet with thin liquids. EGD with dilation in Oct 2016 did reveal a tortuous and spastic esophagus with a stricture requiring dilation with 54 and 57 fr Savary. Plan:     EGD with dilation tomorrow with Dr Jennifer Rai. Procedure and risks explained to the pt which include bleeding, infection, perforation, cardiopulmonary issues and possible death.  Pt understands and agrees to have procedure. Will make him NPO at midnight. Juan Alberto Mazariegos PA-C  Patient is seen and examined in collaboration with Dr. Stephen Roblero. Assessment and plan as per Dr. Stephen Roblero.

## 2017-06-21 NOTE — PROGRESS NOTES
Patient voices no concerns at this time. Patient relaxing in bed with family at bedside for support. Patient just finished up with tx and patient given oxy and valium per order per patient request.  Call light within reach. Will continue to monitor.

## 2017-06-22 ENCOUNTER — ANESTHESIA (OUTPATIENT)
Dept: ENDOSCOPY | Age: 57
DRG: 987 | End: 2017-06-22
Payer: MEDICARE

## 2017-06-22 LAB
ALBUMIN SERPL BCP-MCNC: 2.3 G/DL (ref 3.5–5)
ALBUMIN/GLOB SERPL: 0.8 {RATIO} (ref 1.2–3.5)
ALP SERPL-CCNC: 73 U/L (ref 50–136)
ALT SERPL-CCNC: 25 U/L (ref 12–65)
ANION GAP BLD CALC-SCNC: 7 MMOL/L (ref 7–16)
APTT PPP: 27.7 SEC (ref 23.5–31.7)
AST SERPL W P-5'-P-CCNC: 20 U/L (ref 15–37)
BACTERIA SPEC CULT: NORMAL
BASOPHILS # BLD AUTO: 0 K/UL (ref 0–0.2)
BASOPHILS # BLD: 0 % (ref 0–2)
BILIRUB SERPL-MCNC: 0.3 MG/DL (ref 0.2–1.1)
BLD PROD TYP BPU: NORMAL
BPU ID: NORMAL
BUN SERPL-MCNC: 13 MG/DL (ref 6–23)
CALCIUM SERPL-MCNC: 8.1 MG/DL (ref 8.3–10.4)
CHLORIDE SERPL-SCNC: 107 MMOL/L (ref 98–107)
CO2 SERPL-SCNC: 32 MMOL/L (ref 21–32)
CREAT SERPL-MCNC: 0.72 MG/DL (ref 0.8–1.5)
CRYPTOC AG SER QL LA: NEGATIVE
DIFFERENTIAL METHOD BLD: ABNORMAL
EOSINOPHIL # BLD: 0.1 K/UL (ref 0–0.8)
EOSINOPHIL NFR BLD: 1 % (ref 0.5–7.8)
ERYTHROCYTE [DISTWIDTH] IN BLOOD BY AUTOMATED COUNT: 18.4 % (ref 11.9–14.6)
FIBRINOGEN PPP-MCNC: 256 MG/DL (ref 172–437)
GLOBULIN SER CALC-MCNC: 2.8 G/DL (ref 2.3–3.5)
GLUCOSE SERPL-MCNC: 72 MG/DL (ref 65–100)
GRAM STN SPEC: NORMAL
HCT VFR BLD AUTO: 33 % (ref 41.1–50.3)
HGB BLD-MCNC: 10.5 G/DL (ref 13.6–17.2)
HIV1 P24 AG SERPL QL IA: NONREACTIVE
HIV1+2 AB SERPL QL IA: NONREACTIVE
HSV SPEC CULT: NORMAL
IMM GRANULOCYTES # BLD: 0.1 K/UL (ref 0–0.5)
IMM GRANULOCYTES NFR BLD AUTO: 0.5 % (ref 0–5)
LYMPHOCYTES # BLD AUTO: 19 % (ref 13–44)
LYMPHOCYTES # BLD: 2.7 K/UL (ref 0.5–4.6)
MAGNESIUM SERPL-MCNC: 2 MG/DL (ref 1.8–2.4)
MCH RBC QN AUTO: 25.8 PG (ref 26.1–32.9)
MCHC RBC AUTO-ENTMCNC: 31.8 G/DL (ref 31.4–35)
MCV RBC AUTO: 81.1 FL (ref 79.6–97.8)
MONOCYTES # BLD: 0.3 K/UL (ref 0.1–1.3)
MONOCYTES NFR BLD AUTO: 2 % (ref 4–12)
NEUTS SEG # BLD: 10.9 K/UL (ref 1.7–8.2)
NEUTS SEG NFR BLD AUTO: 78 % (ref 43–78)
P. JIROVECI - PCR, XVPJ: NORMAL
PLATELET # BLD AUTO: 310 K/UL (ref 150–450)
PMV BLD AUTO: 8.8 FL (ref 10.8–14.1)
POTASSIUM SERPL-SCNC: 3.5 MMOL/L (ref 3.5–5.1)
PROT SERPL-MCNC: 5.1 G/DL (ref 6.3–8.2)
RBC # BLD AUTO: 4.07 M/UL (ref 4.23–5.67)
SERVICE CMNT-IMP: NORMAL
SODIUM SERPL-SCNC: 146 MMOL/L (ref 136–145)
SPECIMEN SOURCE: NORMAL
SPECIMEN SOURCE: NORMAL
STATUS OF UNIT,%ST: NORMAL
UNIT DIVISION, %UDIV: 0
UNIT DIVISION, %UDIV: NORMAL
WBC # BLD AUTO: 14.2 K/UL (ref 4.3–11.1)

## 2017-06-22 PROCEDURE — 74011250637 HC RX REV CODE- 250/637: Performed by: PHYSICIAN ASSISTANT

## 2017-06-22 PROCEDURE — 99232 SBSQ HOSP IP/OBS MODERATE 35: CPT | Performed by: INTERNAL MEDICINE

## 2017-06-22 PROCEDURE — 36514 APHERESIS PLASMA: CPT

## 2017-06-22 PROCEDURE — 36430 TRANSFUSION BLD/BLD COMPNT: CPT

## 2017-06-22 PROCEDURE — 74011250637 HC RX REV CODE- 250/637: Performed by: INTERNAL MEDICINE

## 2017-06-22 PROCEDURE — C1726 CATH, BAL DIL, NON-VASCULAR: HCPCS | Performed by: INTERNAL MEDICINE

## 2017-06-22 PROCEDURE — 83735 ASSAY OF MAGNESIUM: CPT | Performed by: INTERNAL MEDICINE

## 2017-06-22 PROCEDURE — 0D758ZZ DILATION OF ESOPHAGUS, VIA NATURAL OR ARTIFICIAL OPENING ENDOSCOPIC: ICD-10-PCS | Performed by: INTERNAL MEDICINE

## 2017-06-22 PROCEDURE — 76040000025: Performed by: INTERNAL MEDICINE

## 2017-06-22 PROCEDURE — 87327 CRYPTOCOCCUS NEOFORM AG IA: CPT | Performed by: INTERNAL MEDICINE

## 2017-06-22 PROCEDURE — 65270000029 HC RM PRIVATE

## 2017-06-22 PROCEDURE — 74011250636 HC RX REV CODE- 250/636

## 2017-06-22 PROCEDURE — 80053 COMPREHEN METABOLIC PANEL: CPT | Performed by: INTERNAL MEDICINE

## 2017-06-22 PROCEDURE — 74011250636 HC RX REV CODE- 250/636: Performed by: INTERNAL MEDICINE

## 2017-06-22 PROCEDURE — P9059 PLASMA, FRZ BETWEEN 8-24HOUR: HCPCS | Performed by: INTERNAL MEDICINE

## 2017-06-22 PROCEDURE — 85025 COMPLETE CBC W/AUTO DIFF WBC: CPT | Performed by: INTERNAL MEDICINE

## 2017-06-22 PROCEDURE — 85730 THROMBOPLASTIN TIME PARTIAL: CPT | Performed by: INTERNAL MEDICINE

## 2017-06-22 PROCEDURE — 76060000031 HC ANESTHESIA FIRST 0.5 HR: Performed by: INTERNAL MEDICINE

## 2017-06-22 PROCEDURE — 74011250636 HC RX REV CODE- 250/636: Performed by: PHYSICIAN ASSISTANT

## 2017-06-22 PROCEDURE — 87389 HIV-1 AG W/HIV-1&-2 AB AG IA: CPT | Performed by: NURSE PRACTITIONER

## 2017-06-22 PROCEDURE — 85384 FIBRINOGEN ACTIVITY: CPT | Performed by: INTERNAL MEDICINE

## 2017-06-22 PROCEDURE — 74011250636 HC RX REV CODE- 250/636: Performed by: ANESTHESIOLOGY

## 2017-06-22 RX ORDER — PROPOFOL 10 MG/ML
INJECTION, EMULSION INTRAVENOUS AS NEEDED
Status: DISCONTINUED | OUTPATIENT
Start: 2017-06-22 | End: 2017-06-22 | Stop reason: HOSPADM

## 2017-06-22 RX ORDER — SODIUM CHLORIDE, SODIUM LACTATE, POTASSIUM CHLORIDE, CALCIUM CHLORIDE 600; 310; 30; 20 MG/100ML; MG/100ML; MG/100ML; MG/100ML
100 INJECTION, SOLUTION INTRAVENOUS CONTINUOUS
Status: DISCONTINUED | OUTPATIENT
Start: 2017-06-22 | End: 2017-06-25

## 2017-06-22 RX ADMIN — OXYCODONE HYDROCHLORIDE 30 MG: 15 TABLET ORAL at 20:33

## 2017-06-22 RX ADMIN — Medication 10 ML: at 21:17

## 2017-06-22 RX ADMIN — DIAZEPAM 10 MG: 5 TABLET ORAL at 10:31

## 2017-06-22 RX ADMIN — CYCLOPHOSPHAMIDE 150 MG: 50 CAPSULE ORAL at 21:17

## 2017-06-22 RX ADMIN — GABAPENTIN 1200 MG: 400 CAPSULE ORAL at 16:15

## 2017-06-22 RX ADMIN — PROPOFOL 50 MG: 10 INJECTION, EMULSION INTRAVENOUS at 08:58

## 2017-06-22 RX ADMIN — Medication 10 ML: at 05:31

## 2017-06-22 RX ADMIN — DEXTROAMPHETAMINE SACCHARATE, AMPHETAMINE ASPARTATE, DEXTROAMPHETAMINE SULFATE AND AMPHETAMINE SULFATE 30 MG: 3.75; 3.75; 3.75; 3.75 TABLET ORAL at 10:31

## 2017-06-22 RX ADMIN — BACLOFEN 20 MG: 10 TABLET ORAL at 10:31

## 2017-06-22 RX ADMIN — SODIUM CHLORIDE, SODIUM LACTATE, POTASSIUM CHLORIDE, AND CALCIUM CHLORIDE 100 ML/HR: 600; 310; 30; 20 INJECTION, SOLUTION INTRAVENOUS at 08:47

## 2017-06-22 RX ADMIN — B-COMPLEX W/ C & FOLIC ACID TAB 1 TABLET: TAB at 10:31

## 2017-06-22 RX ADMIN — Medication 5 ML: at 16:18

## 2017-06-22 RX ADMIN — DIAZEPAM 10 MG: 5 TABLET ORAL at 05:47

## 2017-06-22 RX ADMIN — OXYCODONE HYDROCHLORIDE 30 MG: 15 TABLET ORAL at 05:47

## 2017-06-22 RX ADMIN — DIAZEPAM 10 MG: 5 TABLET ORAL at 16:15

## 2017-06-22 RX ADMIN — DIAZEPAM 10 MG: 5 TABLET ORAL at 20:33

## 2017-06-22 RX ADMIN — ACETAMINOPHEN 650 MG: 325 TABLET ORAL at 15:23

## 2017-06-22 RX ADMIN — GABAPENTIN 1200 MG: 400 CAPSULE ORAL at 10:31

## 2017-06-22 RX ADMIN — ONDANSETRON 4 MG: 2 INJECTION INTRAMUSCULAR; INTRAVENOUS at 15:18

## 2017-06-22 RX ADMIN — DIPHENHYDRAMINE HYDROCHLORIDE 25 MG: 25 CAPSULE ORAL at 15:18

## 2017-06-22 RX ADMIN — BACLOFEN 20 MG: 10 TABLET ORAL at 16:15

## 2017-06-22 RX ADMIN — DIAZEPAM 10 MG: 5 TABLET ORAL at 00:50

## 2017-06-22 RX ADMIN — OXYCODONE HYDROCHLORIDE 30 MG: 15 TABLET ORAL at 16:16

## 2017-06-22 RX ADMIN — PANTOPRAZOLE SODIUM 40 MG: 40 TABLET, DELAYED RELEASE ORAL at 05:31

## 2017-06-22 RX ADMIN — OXYCODONE HYDROCHLORIDE 30 MG: 15 TABLET ORAL at 10:31

## 2017-06-22 RX ADMIN — CALCIUM GLUCONATE: 94 INJECTION, SOLUTION INTRAVENOUS at 15:30

## 2017-06-22 RX ADMIN — MIRTAZAPINE 30 MG: 15 TABLET, FILM COATED ORAL at 21:17

## 2017-06-22 RX ADMIN — BACLOFEN 20 MG: 10 TABLET ORAL at 21:17

## 2017-06-22 RX ADMIN — DESVENLAFAXINE 100 MG: 100 TABLET, EXTENDED RELEASE ORAL at 10:33

## 2017-06-22 RX ADMIN — PROPOFOL 30 MG: 10 INJECTION, EMULSION INTRAVENOUS at 09:00

## 2017-06-22 RX ADMIN — DESVENLAFAXINE 100 MG: 100 TABLET, EXTENDED RELEASE ORAL at 16:16

## 2017-06-22 RX ADMIN — OXYCODONE HYDROCHLORIDE 30 MG: 15 TABLET ORAL at 00:50

## 2017-06-22 RX ADMIN — DOXAZOSIN 4 MG: 4 TABLET ORAL at 10:31

## 2017-06-22 RX ADMIN — LAMOTRIGINE 100 MG: 100 TABLET ORAL at 10:31

## 2017-06-22 RX ADMIN — DEXTROAMPHETAMINE SACCHARATE, AMPHETAMINE ASPARTATE, DEXTROAMPHETAMINE SULFATE AND AMPHETAMINE SULFATE 30 MG: 3.75; 3.75; 3.75; 3.75 TABLET ORAL at 16:15

## 2017-06-22 RX ADMIN — GABAPENTIN 1200 MG: 400 CAPSULE ORAL at 21:17

## 2017-06-22 RX ADMIN — SODIUM CHLORIDE 250 ML: 900 INJECTION, SOLUTION INTRAVENOUS at 18:47

## 2017-06-22 NOTE — PROGRESS NOTES
TRANSFER - IN REPORT:    Verbal report received from [facility], RN (name) on Rahul Silva  being received from room 803 (unit) for ordered procedure. Report consisted of patients Situation, Background, Assessment and   Recommendations(SBAR). Information from the following report(s) SBAR was reviewed with the receiving nurse. Opportunity for questions and clarification was provided. Awaiting transport to bring pt to the GI Lab at this time.

## 2017-06-22 NOTE — PROGRESS NOTES
Inpatient Hematology / Oncology Progress Note      Admission Date: 6/15/2017 11:50 AM  Reason for Admission/Hospital Course: Lung mass [R91.8]  Dysphagia, unspecified [R13.10]  Esophageal stricture [K22.2]    24 Hour Events:  Afebrile. VSS  Feeling well today - still weak  Tolerating PLEX well      ROS:  Constitutional: Negative for fever, chills, weakness, malaise, fatigue. CV: Positive for BLE edema; negative for chest pain, palpitations. Respiratory: Negative for dyspnea, cough, wheezing. GI: Negative for nausea, abdominal pain, diarrhea. 10 point review of systems is otherwise negative with the exception of the elements mentioned above in the HPI. No Known Allergies    OBJECTIVE:  Patient Vitals for the past 8 hrs:   BP Temp Pulse Resp SpO2 Weight   17 1103 119/81 98.7 °F (37.1 °C) 61 17 90 % -   17 0932 129/86 - 60 16 94 % -   17 0927 124/85 - 69 16 92 % -   17 0912 125/80 - 70 16 95 % -   17 0856 125/82 - 78 18 97 % -   17 0848 119/80 - 68 18 92 % -   17 0734 112/76 98.5 °F (36.9 °C) 75 18 90 % -   17 0608 - - - - - 178 lb 6.4 oz (80.9 kg)     Temp (24hrs), Av.2 °F (36.8 °C), Min:97.2 °F (36.2 °C), Max:99 °F (37.2 °C)     0701 -  1900  In: 100 [I.V.:100]  Out: 1     Physical Exam:  Constitutional: Well developed, well nourished male in no acute distress, lying comfortably in the hospital bed. HEENT: Normocephalic and atraumatic. Oropharynx is clear, mucous membranes are moist. Sclerae anicteric. Neck supple    Skin Warm and dry. No bruising and no rash noted. No erythema. No pallor. Respiratory Lungs are clear to auscultation bilaterally without wheezes, rales or rhonchi, normal air exchange without accessory muscle use. CVS Normal rate, regular rhythm and normal S1 and S2. No murmurs, gallops, or rubs. Abdomen RUQ tenderness with palpation-he states is chronic. Soft and nondistended, normoactive bowel sounds.   No palpable mass. Neuro Grossly nonfocal with no obvious sensory or motor deficits. MSK Normal range of motion in general.  No edema and no tenderness. Psych Appropriate mood and affect. Right neck temp apheresis cath with small old blood noted to dressing    Labs:      Recent Labs      06/22/17   0540  06/21/17   0550  06/20/17   1512   WBC  14.2*  11.7*  10.3   RBC  4.07*  4.48  4.11*   HGB  10.5*  11.3*  10.5*   HCT  33.0*  36.6*  33.6*   MCV  81.1  81.7  81.8   MCH  25.8*  25.2*  25.5*   MCHC  31.8  30.9*  31.3*   RDW  18.4*  18.4*  18.0*   PLT  310  369  415   GRANS  78  58  58   LYMPH  19  35  36   MONOS  2*  6  6   EOS  1  0*  0*   BASOS  0  0  0   IG  0.5  0.6  0   DF  AUTOMATED  AUTOMATED  AUTOMATED   ANEU  10.9*  6.9  6.0   ABL  2.7  4.1  3.7   ABM  0.3  0.7  0.6   YEMI  0.1  0.0  0.0   ABB  0.0  0.0  0.0   AIG  0.1  0.1  0.0        Recent Labs      06/22/17   0540  06/21/17   0550  06/20/17   1512  06/19/17   1548   NA  146*  147*  146*   --    K  3.5  3.6  3.5   --    CL  107  109*  109*   --    CO2  32  34*  30   --    AGAP  7  4*  7   --    GLU  72  79  75   --    BUN  13  19  15   --    CREA  0.72*  0.97  0.78*   --    GFRAA  >60  >60  >60   --    GFRNA  >60  >60  >60   --    CA  8.1*  8.3  8.8   --    SGOT  20  47*   --    --    AP  73  94   --    --    TP  5.1*  5.8*   --    --    ALB  2.3*  2.6*   --    --    GLOB  2.8  3.2   --    --    AGRAT  0.8*  0.8*   --    --    MG  2.0  2.1   --   2.2         Imaging:  CT Chest 6/15/17  HISTORY: Lower extremities pain and swelling. Pulmonary infiltrates.     EXAM: Contiguous axial CT images were obtained from the thoracic inlet to the  upper abdomen following the uneventful IV administration of 100 mL Isovue-370. Coronal reformations were performed.     Radiation dose reduction techniques were used for this study.  All CT scans  performed at this facility use one or all of the following: Automated exposure  control, adjustment of the mA and/or kVp according to patient's size, and  iterative reconstruction.     PRIOR STUDY: 05/08/2017     FINDINGS: There is no evidence of pulmonary embolus through the subsegmental  level bilaterally. The main pulmonary artery is normal in size. There are  multiple mildly prominent mediastinal lymph nodes with a representative right  paratracheal lymph node measuring 18 x 10 mm. There are nonenlarged hilar lymph  nodes bilaterally. There is no pericardial effusion.     There has been progressive consolidation and volume loss in the right upper lobe  with areas of air bronchograms as well as multiple cavitary foci. The  consolidative and cavitary process in the right lower lobe has also mildly  enlarged, now measuring 5.6 x 2.5 cm compared with 4.2 x 2.5 cm previously. There is a new 6 mm irregular, nodular density (image 72) located just anterior  and inferior to this larger process. There are minor groundglass opacities in  the left lower lobe, new from the prior exam. There is a background of  centrilobular emphysema. There is no pleural effusion. Several tiny nodular  densities in the right lung base (images 93-96) have developed since the prior  exam.     There are subcentimeter hypodensities in the left hepatic lobe. Several  calcified gallstones are present in the gallbladder. The remaining included  upper abdominal organs are unremarkable. There are no acute or concerning bony  lesions.     IMPRESSION  IMPRESSION:      1. Progressive consolidation and volume loss in the right upper lobe with new  areas of cavitation. The consolidative cavitary process in the right lower lobe  has also mildly enlarged, and several new irregular and groundglass nodules have  developed bilaterally. Findings are suggestive of an infectious process,  possibly fungal.     2. No evidence of pulmonary embolus.     3. Mildly prominent mediastinal lymph nodes which are likely reactive.     4. Cholelithiasis.         ASSESSMENT:    Problem List Date Reviewed: 6/22/2017          Codes Class Noted    Bradycardia ICD-10-CM: R00.1  ICD-9-CM: 427.89  6/20/2017        Severe protein-calorie malnutrition ([facility] (Chronic) ICD-10-CM: E43  ICD-9-CM: 262  6/20/2017        Goodpasture syndrome ([facility] [address] [facility] ICD-10-CM: M31.0  ICD-9-CM: 446.21  6/17/2017        BPH (benign prostatic hyperplasia) (Chronic) ICD-10-CM: N40.0  ICD-9-CM: 600.00  6/16/2017        Anxiety and depression (Chronic) ICD-10-CM: F41.9, F32.9  ICD-9-CM: 300.00, 311  6/15/2017        History of hepatitis C (Chronic) ICD-10-CM: Z86.19  ICD-9-CM: V12.09  6/15/2017    Overview Signed 8/7/2012 10:55 AM by Marylou Olvera III     S/p treatment             Chronic shoulder pain (Chronic) ICD-10-CM: M25.519, G89.29  ICD-9-CM: 719.41, 338.29  6/15/2017        HLD (hyperlipidemia) (Chronic) ICD-10-CM: E78.5  ICD-9-CM: 272.4  6/15/2017        * (Principal)Pulmonary cavitary lesion ICD-10-CM: J98.4  ICD-9-CM: 518.89  6/15/2017        Abnormal CXR ICD-10-CM: R93.8  ICD-9-CM: 793.2  6/15/2017        Elevated C-reactive protein (CRP) ICD-10-CM: U27.26  ICD-9-CM: 790.95  6/15/2017                PLAN:  Goodpasture syndrome   6/19: Mr Keen was informed of the apheresis process and purpose and is agreeable  IR was consulted today for temp cath placement. Once line is placed we will start plasmapheresis with the plan of daily for 5 days and then every other day. GBM antibody was 75 on admission. Per Pulmonary, he is scheduled for Bronch/EBUS tomorrow. He will continue on steroids and cytoxan.   6/21 S/p bronch yesterday and found white exudate false cords and diffuse inflammation, edema, erythema. BAL done and pending. Day 2 PLEX today. Plan to repeat levels in 1 week. Continue steroids/cytoxan  6/22 Clinically no changes. Day 3 PLEX today, will continue with steroids and cytoxan.          [facility]  [address] [address]  Office : [phone]  Fax : [phone]

## 2017-06-22 NOTE — PROGRESS NOTES
Yoselin Miranda Adcox  Admission Date: 6/15/2017             Daily Progress Note: 6/22/2017    The patient's chart is reviewed and the patient is discussed with the staff. Subjective:     60 yo male presents with chills, shortness of breath, and edema. CT chest 4/21/17 revealed RLL 4.4 cm x 3.1cm density. Had chest CT 5/8/17 with multiple large infiltrative nodules in R lung concerning for cavitation. Was seen 6/8 by Dr. Nandini Rosales and started on Levaquin on 6/9. Was seen by Dr. Nandini Rosales the morning of admission, was not any better,hypoxic on RA, and was referred for admission / ongoing workup of cavitary lesion. History of smoking <1 ppd x 10 years off and on, quit in 2014. CRP elevated at 283. Anti GBM Ab +- initiated on Cyclophosphamide and pulse dose steroid 06/17/17. Nephrology consulted with no plans for renal bx at this time (normal renal function / no blood or microscopic protein in urine). Also seen by heme/Onc with plans for plasma pheresis. Chronic depression, anxiety, ADD,Hepatitis C, BPH, and chronic pain. Has been feeling poorly, lost 40lbs over 3 months secondary to anorexia. Weaned to room air and breathing ok.   Having urinary hesitancy    Current Facility-Administered Medications   Medication Dose Route Frequency    heparin (PF) 2 units/ml in NS infusion 2,000 Units  1,000 mL Irrigation Multiple    sodium chloride (NS) flush 5-10 mL  5-10 mL IntraVENous Q8H    sodium chloride (NS) flush 5-10 mL  5-10 mL IntraVENous PRN    lidocaine (XYLOCAINE) 2 % jelly   Mucous Membrane PRN    acetaminophen (TYLENOL) tablet 650 mg  650 mg Oral Q4H PRN    diphenhydrAMINE (BENADRYL) capsule 25 mg  25 mg Oral Q6H PRN    cyclophosphamide (CYTOXAN) chemo capsule 150 mg  150 mg Oral Q24H    dextroamphetamine-amphetamine (ADDERALL) tablet 30 mg  30 mg Oral TID    baclofen (LIORESAL) tablet 20 mg  20 mg Oral TID    DESVENLAFAXINE SUCCINATE (PRISTIQ PO) - patient supplied  100 mg Oral BID    doxazosin (CARDURA) tablet 4 mg  4 mg Oral DAILY    gabapentin (NEURONTIN) capsule 1,200 mg  1,200 mg Oral TID    lamoTRIgine (LaMICtal) tablet 100 mg  100 mg Oral DAILY    mirtazapine (REMERON) tablet 30 mg  30 mg Oral QHS    multivitamin, stress formula (STRESS TAB) tablet 1 Tab  1 Tab Oral DAILY    pantoprazole (PROTONIX) tablet 40 mg  40 mg Oral ACB    oxyCODONE IR (OXY-IR) immediate release tablet 30 mg  30 mg Oral Q4H PRN    diazePAM (VALIUM) tablet 10 mg  10 mg Oral Q4H PRN    sodium chloride (NS) flush 5-10 mL  5-10 mL IntraVENous Q8H    sodium chloride (NS) flush 5-10 mL  5-10 mL IntraVENous PRN    albuterol (PROVENTIL VENTOLIN) nebulizer solution 2.5 mg  2.5 mg Nebulization Q6H PRN    cefTRIAXone (ROCEPHIN) 1 g in 0.9% sodium chloride (MBP/ADV) 50 mL  1 g IntraVENous Q24H    azithromycin (ZITHROMAX) 500 mg in 0.9% sodium chloride (MBP/ADV) 250 mL  500 mg IntraVENous Q24H    acetaminophen (TYLENOL) tablet 650 mg  650 mg Oral Q4H PRN    naloxone (NARCAN) injection 0.4 mg  0.4 mg IntraVENous PRN    ondansetron (ZOFRAN) injection 4 mg  4 mg IntraVENous Q4H PRN    senna-docusate (PERICOLACE) 8.6-50 mg per tablet 1 Tab  1 Tab Oral BID PRN       Review of Systems    Constitutional: negative for fever, chills, sweats  Cardiovascular: negative for chest pain, palpitations, syncope, edema  Gastrointestinal:  negative for dysphagia, reflux, vomiting, diarrhea, abdominal pain, or melena  Neurologic:  negative for focal weakness, numbness, headache    Objective:     Vitals:    06/22/17 0050 06/22/17 0253 06/22/17 0608 06/22/17 0734   BP: 151/87 121/76  112/76   Pulse:  84  75   Resp:  18  18   Temp:  98.4 °F (36.9 °C)  98.5 °F (36.9 °C)   SpO2:  93%  90%   Weight:   178 lb 6.4 oz (80.9 kg)    Height:         Intake and Output:   06/20 1901 - 06/22 0700  In: 3579 [P.O.:1380; I.V.:417]  Out: 4035        Physical Exam:   Constitution:  the patient is well developed and in no acute distress  EENMT:  Sclera clear, pupils equal, oral mucosa moist  Respiratory: clear  Cardiovascular:  RRR without M,G,R  Gastrointestinal: soft and non-tender; with positive bowel sounds. Musculoskeletal: warm without cyanosis. There is no lower leg edema. Skin:  no jaundice or rashes, no wounds   Neurologic: no gross neuro deficits     Psychiatric:  alert and oriented x 3    CXR:       LAB  No results for input(s): GLUCPOC in the last 72 hours. No lab exists for component: [place]   Recent Labs      06/22/17   0540  06/21/17   0550  06/20/17   1512  06/19/17   1548   WBC  14.2*  11.7*  10.3  10.9   HGB  10.5*  11.3*  10.5*  10.8*   HCT  33.0*  36.6*  33.6*  34.1*   PLT  310  369  415  392   INR   --   1.1  1.2  1.2     Recent Labs      06/22/17   0540  06/21/17   0550  06/20/17   1512  06/19/17   1548   NA  146*  147*  146*   --    K  3.5  3.6  3.5   --    CL  107  109*  109*   --    CO2  32  34*  30   --    GLU  72  79  75   --    BUN  13  19  15   --    CREA  0.72*  0.97  0.78*   --    MG  2.0  2.1   --   2.2   CA  8.1*  8.3  8.8   --    ALB  2.3*  2.6*   --    --    TBILI  0.3  0.2   --    --    ALT  25  38   --    --    SGOT  20  47*   --    --      No results for input(s): PH, PCO2, PO2, HCO3 in the last 72 hours. No results for input(s): LCAD, LAC in the last 72 hours.       Assessment:  (Medical Decision Making)     Hospital Problems  Date Reviewed: 6/21/2017          Codes Class Noted POA    Bradycardia ICD-10-CM: R00.1  ICD-9-CM: 427.89  6/20/2017 Unknown    better    Severe protein-calorie malnutrition (HCC) (Chronic) ICD-10-CM: E43  ICD-9-CM: 262  6/20/2017 Yes        Goodpasture syndrome ([facility] [address] ICD-10-CM: M31.0  ICD-9-CM: 446.21  6/17/2017 Yes    plasmapheresis    BPH (benign prostatic hyperplasia) (Chronic) ICD-10-CM: N40.0  ICD-9-CM: 600.00  6/16/2017 Yes        Anxiety and depression (Chronic) ICD-10-CM: F41.9, F32.9  ICD-9-CM: 300.00, 311  6/15/2017 Yes        History of hepatitis C (Chronic) ICD-10-CM: Z86.19  ICD-9-CM: V12.09 6/15/2017 Yes    Overview Signed 8/7/2012 10:55 AM by Birgit Roman III     S/p treatment             Chronic shoulder pain (Chronic) ICD-10-CM: M25.519, G89.29  ICD-9-CM: 719.41, 338.29  6/15/2017 Yes        HLD (hyperlipidemia) (Chronic) ICD-10-CM: E78.5  ICD-9-CM: 272.4  6/15/2017 Yes        * (Principal)Pulmonary cavitary lesion ICD-10-CM: J98.4  ICD-9-CM: 518.89  6/15/2017 Yes    Bronch results - no cancer on bx, cultures pending    Abnormal CXR ICD-10-CM: R93.8  ICD-9-CM: 793.2  6/15/2017 Yes        Elevated C-reactive protein (CRP) ICD-10-CM: R79.82  ICD-9-CM: 790.95  6/15/2017 Yes              Plan:  (Medical Decision Making)   1    Renal has signed off  2    Plasmapheresis 5 days then every other day  3    Id following- 7 days of antibx- will stop  --    More than 50% of the time documented was spent in face-to-face contact with the patient and in the care of the patient on the floor/unit where the patient is located.     Arin Jacobs MD

## 2017-06-22 NOTE — PROGRESS NOTES
Patient in bed resting with no complaints at this time. Patient is alert and self care. Call light within reach. Will continue to monitor.

## 2017-06-22 NOTE — PROCEDURES
Endoscopic Gastroduodenoscopy Procedure Note    Indications: Dysphagia    Anesthesia/Sedation: MAC IV     Pre-Procedure Physical:    Current Facility-Administered Medications   Medication Dose Route Frequency    lactated Ringers infusion  100 mL/hr IntraVENous CONTINUOUS    heparin (PF) 2 units/ml in NS infusion 2,000 Units  1,000 mL Irrigation Multiple    sodium chloride (NS) flush 5-10 mL  5-10 mL IntraVENous Q8H    sodium chloride (NS) flush 5-10 mL  5-10 mL IntraVENous PRN    lidocaine (XYLOCAINE) 2 % jelly   Mucous Membrane PRN    acetaminophen (TYLENOL) tablet 650 mg  650 mg Oral Q4H PRN    diphenhydrAMINE (BENADRYL) capsule 25 mg  25 mg Oral Q6H PRN    cyclophosphamide (CYTOXAN) chemo capsule 150 mg  150 mg Oral Q24H    dextroamphetamine-amphetamine (ADDERALL) tablet 30 mg  30 mg Oral TID    baclofen (LIORESAL) tablet 20 mg  20 mg Oral TID    DESVENLAFAXINE SUCCINATE (PRISTIQ PO) - patient supplied  100 mg Oral BID    doxazosin (CARDURA) tablet 4 mg  4 mg Oral DAILY    gabapentin (NEURONTIN) capsule 1,200 mg  1,200 mg Oral TID    lamoTRIgine (LaMICtal) tablet 100 mg  100 mg Oral DAILY    mirtazapine (REMERON) tablet 30 mg  30 mg Oral QHS    multivitamin, stress formula (STRESS TAB) tablet 1 Tab  1 Tab Oral DAILY    pantoprazole (PROTONIX) tablet 40 mg  40 mg Oral ACB    oxyCODONE IR (OXY-IR) immediate release tablet 30 mg  30 mg Oral Q4H PRN    diazePAM (VALIUM) tablet 10 mg  10 mg Oral Q4H PRN    sodium chloride (NS) flush 5-10 mL  5-10 mL IntraVENous Q8H    sodium chloride (NS) flush 5-10 mL  5-10 mL IntraVENous PRN    albuterol (PROVENTIL VENTOLIN) nebulizer solution 2.5 mg  2.5 mg Nebulization Q6H PRN    acetaminophen (TYLENOL) tablet 650 mg  650 mg Oral Q4H PRN    naloxone (NARCAN) injection 0.4 mg  0.4 mg IntraVENous PRN    ondansetron (ZOFRAN) injection 4 mg  4 mg IntraVENous Q4H PRN    senna-docusate (PERICOLACE) 8.6-50 mg per tablet 1 Tab  1 Tab Oral BID PRN Facility-Administered Medications Ordered in Other Encounters   Medication Dose Route Frequency    propofol (DIPRIVAN) 10 mg/mL injection    PRN      Review of patient's allergies indicates no known allergies. Patient Vitals for the past 8 hrs:   BP Temp Pulse Resp SpO2 Weight   06/22/17 0856 125/82 - 78 18 97 % -   06/22/17 0848 119/80 - 68 18 92 % -   06/22/17 0734 112/76 98.5 °F (36.9 °C) 75 18 90 % -   06/22/17 0608 - - - - - 80.9 kg (178 lb 6.4 oz)   06/22/17 0253 121/76 98.4 °F (36.9 °C) 84 18 93 % -       Exam      Airway: clear   Heart: normal S1and S2    Lungs: clear bilateral  Abdomen: soft, nontender, bowel sounds present and normal in all quads   Mental Status: awake, alert and oriented to person, place and time          Procedure Details     Informed consent was obtained for the procedure, including conscious sedation. Risks of pancreatitis, infection, perforation, hemorrhage, adverse drug reaction and aspiration were discussed. The patient was placed in the left lateral decubitus position. Based on the pre-procedure assessment, including review of the patient's medical history, medications, allergies, and review of systems, he had been deemed to be an appropriate candidate for conscious sedation; he was therefore sedated with the medications listed below. He was monitored continuously with ECG tracing, pulse oximetry, blood pressure monitoring, and direct observation. The EGD gastroscope was inserted into the mouth and advanced under direct vision to the second portion of the duodenum. A careful inspection was made as the gastroscope was withdrawn, including a retroflexed view of the proximal stomach; findings and interventions are described below. Appropriate photodocumentation was obtained. Findings:   Esophagus- Mild Schatzki ring. Dilated with 15-18 mm balloon. No trauma. Esophagus had lots of thick secretions in it.   Stomach- Normal.  Duodenum- Normal.    Therapies: Dilation    Specimens: None    Estimated Blood Loss: 0 cc           Complications:   None; patient tolerated the procedure well. Attending Attestation:  I performed the procedure. Impression:    Mild Schatzki ring. Dilated with 15-18 mm balloon. Esophagus had lots of thick secretions in it. Recommendations:  Repeat dilation prn.

## 2017-06-22 NOTE — ANESTHESIA POSTPROCEDURE EVALUATION
Post-Anesthesia Evaluation and Assessment    Patient: Marylou Cormier MRN: [id number]  SSN: [id number]    YOB: 1960  Age: 61 y.o. Sex: male       Cardiovascular Function/Vital Signs  Visit Vitals    /80    Pulse 70    Temp 36.9 °C (98.5 °F)    Resp 16    Ht 5' 7\" (1.702 m)    Wt 80.9 kg (178 lb 6.4 oz)    SpO2 95%    BMI 27.94 kg/m2       Patient is status post total IV anesthesia anesthesia for Procedure(s):  ESOPHAGOGASTRODUODENOSCOPY (EGD) DILATION/ 34/ 803  ESOPHAGEAL DILATION. Nausea/Vomiting: None    Postoperative hydration reviewed and adequate. Pain:  Pain Scale 1: Numeric (0 - 10) (06/22/17 0856)  Pain Intensity 1: 0 (06/22/17 0856)   Managed    Neurological Status:   Neuro  Neurologic State: Alert (06/22/17 6242)  Orientation Level: Oriented X4 (06/22/17 0719)  Cognition: Appropriate decision making; Appropriate for age attention/concentration; Appropriate safety awareness; Follows commands (06/22/17 0719)   At baseline    Mental Status and Level of Consciousness: Arousable    Pulmonary Status:   O2 Device: Nasal cannula (06/22/17 0912)   Adequate oxygenation and airway patent    Complications related to anesthesia: None    Post-anesthesia assessment completed.  No concerns    Signed By: Shannon Dillard MD     June 22, 2017

## 2017-06-22 NOTE — PROGRESS NOTES
Patient in bed resting with no complaints at this time. Patient is alert and orientated with no distress noted. IV intact and patent with no s/s of infection noted. Respirations even and unlabored with heart rate regular. Patient able to ambulate independently without assistance. Bed in low locked position with call light within reach. Patient instructed to call if assistance is needed. Will continue to monitor. Patient found this AM vapeing in bed. Upon admission family was given three vapes and told to take them home and it was not allowed here in the hospital.  Night nurse has also found him vapeing and again told family to take it home and it was not allowed here in the hospital.  Patient again was educated this AM that vapeing is not allowed in hospital and family needed to take vape home or we would have to lock it up. Patient stated he would give the vape to his family. Will continue to monitor this situation.

## 2017-06-22 NOTE — PROGRESS NOTES
TRANSFER - IN REPORT:    Verbal report received from Brad Gomez RN on [address]  being received from GI lab for routine progression of care      Report consisted of patients Situation, Background, Assessment and   Recommendations(SBAR). Information from the following report(s) SBAR was reviewed with the receiving nurse. Opportunity for questions and clarification was provided. Assessment completed upon patients arrival to unit and care assumed.

## 2017-06-22 NOTE — PROGRESS NOTES
.  [facility] Consult Note             Date: 6/22/2017    GI Problem: Dysphagia    History of Present Illness:  Patient is a 61 y.o. male who is seen in consultation for dysphagia.      Hospital Medications:  Current Facility-Administered Medications   Medication Dose Route Frequency    lactated Ringers infusion  100 mL/hr IntraVENous CONTINUOUS    heparin (PF) 2 units/ml in NS infusion 2,000 Units  1,000 mL Irrigation Multiple    sodium chloride (NS) flush 5-10 mL  5-10 mL IntraVENous Q8H    sodium chloride (NS) flush 5-10 mL  5-10 mL IntraVENous PRN    lidocaine (XYLOCAINE) 2 % jelly   Mucous Membrane PRN    acetaminophen (TYLENOL) tablet 650 mg  650 mg Oral Q4H PRN    diphenhydrAMINE (BENADRYL) capsule 25 mg  25 mg Oral Q6H PRN    cyclophosphamide (CYTOXAN) chemo capsule 150 mg  150 mg Oral Q24H    dextroamphetamine-amphetamine (ADDERALL) tablet 30 mg  30 mg Oral TID    baclofen (LIORESAL) tablet 20 mg  20 mg Oral TID    DESVENLAFAXINE SUCCINATE (PRISTIQ PO) - patient supplied  100 mg Oral BID    doxazosin (CARDURA) tablet 4 mg  4 mg Oral DAILY    gabapentin (NEURONTIN) capsule 1,200 mg  1,200 mg Oral TID    lamoTRIgine (LaMICtal) tablet 100 mg  100 mg Oral DAILY    mirtazapine (REMERON) tablet 30 mg  30 mg Oral QHS    multivitamin, stress formula (STRESS TAB) tablet 1 Tab  1 Tab Oral DAILY    pantoprazole (PROTONIX) tablet 40 mg  40 mg Oral ACB    oxyCODONE IR (OXY-IR) immediate release tablet 30 mg  30 mg Oral Q4H PRN    diazePAM (VALIUM) tablet 10 mg  10 mg Oral Q4H PRN    sodium chloride (NS) flush 5-10 mL  5-10 mL IntraVENous Q8H    sodium chloride (NS) flush 5-10 mL  5-10 mL IntraVENous PRN    albuterol (PROVENTIL VENTOLIN) nebulizer solution 2.5 mg  2.5 mg Nebulization Q6H PRN    acetaminophen (TYLENOL) tablet 650 mg  650 mg Oral Q4H PRN    naloxone (NARCAN) injection 0.4 mg  0.4 mg IntraVENous PRN    ondansetron (ZOFRAN) injection 4 mg  4 mg IntraVENous Q4H PRN    senna-docusate (PERICOLACE) 8.6-50 mg per tablet 1 Tab  1 Tab Oral BID PRN     Facility-Administered Medications Ordered in Other Encounters   Medication Dose Route Frequency    propofol (DIPRIVAN) 10 mg/mL injection    PRN       Objective:     Physical Exam:  Vitals:  Visit Vitals    /82    Pulse 78    Temp 98.5 °F (36.9 °C)    Resp 18    Ht 5' 7\" (1.702 m)    Wt 80.9 kg (178 lb 6.4 oz)    SpO2 97%    BMI 27.94 kg/m2       General: No acute distress. Skin:  Extremities and face reveal no rashes. No interiano erythema. No telangiectasias on the chest wall. HEENT: Sclerae anicteric. No oral ulcers. No abnormal pigmentation of the lips. The neck is supple. Cardiovascular: Regular rate and rhythm. No murmurs, gallops, or rubs. Respiratory:  Comfortable breathing  With no accessory muscle use. Clear breath sounds with no wheezes, rales, or rhonchi. GI:  Abdomen nondistended, soft, and nontender. Normal active bowel sounds. No enlargement of the liver or spleen. No masses palpable. Musculoskeletal:  No pitting edema of the lower legs. Extremities have good range of motion. Neurological:  Gross memory appears intact. Patient is alert and oriented. Psychiatric:  Mood appears appropriate with judgement intact. Lymphatic:  No cervical or supraclavicular adenopathy. Laboratory:    Recent Labs      06/22/17   0540   WBC  14.2*   RBC  4.07*   HGB  10.5*   HCT  33.0*   PLT  310      Recent Labs      06/22/17   0540   GLU  72   NA  146*   K  3.5   CL  107   CO2  32   BUN  13   CREA  0.72*   CA  8.1*     Recent Labs      06/22/17   0540   06/21/17   0550   PTP   --    --   12.2*   INR   --    --   1.1   APTT  27.7   < >   --     < > = values in this interval not displayed.      Recent Labs      06/22/17   0540  06/21/17   0550   CBIL   --   <0.1   SGOT  20  47*   AP  73  94   ALB  2.3*  2.6*   TP  5.1*  5.8*       Assessment:       A 61 y.o. male with dysphagia      Plan:       EGD    Signed By: Angeline Fallon MD     June 22, 2017

## 2017-06-22 NOTE — ANESTHESIA PREPROCEDURE EVALUATION
Anesthetic History   No history of anesthetic complications            Review of Systems / Medical History  Patient summary reviewed, nursing notes reviewed and pertinent labs reviewed    Pulmonary          Shortness of breath and smoker (quit 2014)      Comments: RLL cavitary lesions  Goodpasture's syndrome- undergoing plasmaphoresis   Neuro/Psych         Psychiatric history     Cardiovascular                  Exercise tolerance: <4 METS  Comments: Echo- 6/20/17- EF 55%   GI/Hepatic/Renal       Hepatitis: type C         Endo/Other        Anemia     Other Findings              Physical Exam    Airway  Mallampati: III      Mouth opening: Normal     Cardiovascular    Rhythm: regular  Rate: normal         Dental    Dentition: Edentulous     Pulmonary                 Abdominal         Other Findings            Anesthetic Plan    ASA: 3  Anesthesia type: total IV anesthesia          Induction: Intravenous  Anesthetic plan and risks discussed with: Patient      Discussed TIVA with its benefits (lower risk of nausea and sore throat, etc.) and risks including possible awareness, patient understands and elects to proceed

## 2017-06-22 NOTE — PROGRESS NOTES
TRANSFER - OUT REPORT:    Verbal report given to Ranjeet Cano RN on [address]  being transferred to GI lab for routine progression of care       Report consisted of patients Situation, Background, Assessment and   Recommendations(SBAR). Information from the following report(s) SBAR was reviewed with the receiving nurse. Lines:   Peripheral IV 06/15/17 Left Arm (Active)   Site Assessment Clean, dry, & intact 6/22/2017  7:22 AM   Phlebitis Assessment 0 6/22/2017  7:22 AM   Infiltration Assessment 0 6/22/2017  7:22 AM   Dressing Status Clean, dry, & intact 6/22/2017  7:22 AM   Dressing Type Tape;Transparent 6/22/2017  7:22 AM   Hub Color/Line Status Patent 6/22/2017  7:22 AM   Action Taken Open ports on tubing capped 6/19/2017  3:02 PM        Opportunity for questions and clarification was provided.

## 2017-06-22 NOTE — PROGRESS NOTES
Procedure: Therapeutic plasma exchange  Dx: Good pasture syndrome  Day: 3 of 5 ( daily, then every other day)  Labs drawn: CBC, CMP,PTT, FIBRINOGEN  Calcium used:   4 grams  Replacement product: FFP  Replace volume: 3017  Remove volume: 3774  TBV processed:100%    IMVU blood tubing lot # (if FFP used): [id number]  Issues: pt A&O X3,  BP dropped during procedure procedure was paused, 250 NS bolus was given, Calcium gluconate rate was increased, BP increased and procedure was reinitiated with AC rate decreased. BP dropped again at 1.0 TPV processed, 500 cc Saline bolus initiated and notified Brodie Gregorio NP. Ok to stop procedure. BP dropped to 60's/40's while bolus was infusing, notified Dr. Kaden Gardner, order received to complete 500 cc bolus and may give another 250 ccs of NS prn. O2 was placed due to sat 86-88%; sat recovered with 0.5 L via NC.     Next procedure date: 6/23/17  Labs needed: per protocol    Report given to cathi Tsai RN

## 2017-06-22 NOTE — PROGRESS NOTES
Patient in bed resting during plasmapheresis. BP low at this time. Patient is alert and responding correctly. Call light within reach.   Report to be given to oncoming RN 7p-7a

## 2017-06-23 ENCOUNTER — APPOINTMENT (OUTPATIENT)
Dept: GENERAL RADIOLOGY | Age: 57
DRG: 987 | End: 2017-06-23
Attending: INTERNAL MEDICINE
Payer: MEDICARE

## 2017-06-23 PROBLEM — K22.2 SCHATZKI'S RING: Status: ACTIVE | Noted: 2017-06-23

## 2017-06-23 LAB
ALBUMIN SERPL BCP-MCNC: 2.4 G/DL (ref 3.5–5)
ALBUMIN/GLOB SERPL: 0.9 {RATIO} (ref 1.2–3.5)
ALP SERPL-CCNC: 70 U/L (ref 50–136)
ALT SERPL-CCNC: 23 U/L (ref 12–65)
ANION GAP BLD CALC-SCNC: 8 MMOL/L (ref 7–16)
APTT PPP: 29.2 SEC (ref 23.5–31.7)
AST SERPL W P-5'-P-CCNC: 23 U/L (ref 15–37)
BASOPHILS # BLD AUTO: 0 K/UL (ref 0–0.2)
BASOPHILS # BLD: 0 % (ref 0–2)
BILIRUB SERPL-MCNC: 0.2 MG/DL (ref 0.2–1.1)
BLD PROD TYP BPU: NORMAL
BPU ID: NORMAL
BUN SERPL-MCNC: 10 MG/DL (ref 6–23)
CALCIUM SERPL-MCNC: 8.2 MG/DL (ref 8.3–10.4)
CD3 CELLS # SPEC: 91 %
CD3+CD4+ CELLS # BLD: 48 %
CD4:CD8 RATIO, C48RBT: 1.26 RATIO
CD8, CD8BT: 38 %
CHLORIDE SERPL-SCNC: 108 MMOL/L (ref 98–107)
CMV PCR QUANT VIACTI,XCMVPT: NORMAL
CO2 SERPL-SCNC: 32 MMOL/L (ref 21–32)
CREAT SERPL-MCNC: 0.72 MG/DL (ref 0.8–1.5)
DIFFERENTIAL METHOD BLD: ABNORMAL
EBV PCR QUANT VIACTI,XEBVPT: NORMAL
EBV PCR QUANT VIACTI,XEBVPT: NORMAL
EOSINOPHIL # BLD: 0 K/UL (ref 0–0.8)
EOSINOPHIL NFR BLD: 0 % (ref 0.5–7.8)
ERYTHROCYTE [DISTWIDTH] IN BLOOD BY AUTOMATED COUNT: 19.2 % (ref 11.9–14.6)
FIBRINOGEN PPP-MCNC: 260 MG/DL (ref 172–437)
FLUAV RNA SPEC QL NAA+PROBE: NEGATIVE
FLUBV RNA SPEC QL NAA+PROBE: NEGATIVE
GLOBULIN SER CALC-MCNC: 2.8 G/DL (ref 2.3–3.5)
GLUCOSE SERPL-MCNC: 98 MG/DL (ref 65–100)
HADV DNA SPEC QL NAA+PROBE: NEGATIVE
HCT VFR BLD AUTO: 33.1 % (ref 41.1–50.3)
HGB BLD-MCNC: 10.4 G/DL (ref 13.6–17.2)
HMPV RNA SPEC QL NAA+PROBE: NEGATIVE
HPIV1 RNA SPEC QL NAA+PROBE: NEGATIVE
HPIV2 RNA SPEC QL NAA+PROBE: NEGATIVE
HPIV3 RNA SPEC QL NAA+PROBE: NEGATIVE
IMM GRANULOCYTES # BLD: 0.1 K/UL (ref 0–0.5)
IMM GRANULOCYTES NFR BLD AUTO: 0.9 % (ref 0–5)
LYMPHOCYTES # BLD AUTO: 30 % (ref 13–44)
LYMPHOCYTES # BLD: 2.9 K/UL (ref 0.5–4.6)
MAGNESIUM SERPL-MCNC: 2.2 MG/DL (ref 1.8–2.4)
MCH RBC QN AUTO: 25.9 PG (ref 26.1–32.9)
MCHC RBC AUTO-ENTMCNC: 31.4 G/DL (ref 31.4–35)
MCV RBC AUTO: 82.3 FL (ref 79.6–97.8)
MONOCYTES # BLD: 0.4 K/UL (ref 0.1–1.3)
MONOCYTES NFR BLD AUTO: 4 % (ref 4–12)
NEUTS SEG # BLD: 6.1 K/UL (ref 1.7–8.2)
NEUTS SEG NFR BLD AUTO: 65 % (ref 43–78)
P. JIROVECI - PCR, XVPJ: NORMAL
PLATELET # BLD AUTO: 297 K/UL (ref 150–450)
PMV BLD AUTO: 8.9 FL (ref 10.8–14.1)
POTASSIUM SERPL-SCNC: 3.4 MMOL/L (ref 3.5–5.1)
PROT SERPL-MCNC: 5.2 G/DL (ref 6.3–8.2)
RBC # BLD AUTO: 4.02 M/UL (ref 4.23–5.67)
RHINOVIRUS RNA SPEC QL NAA+PROBE: NEGATIVE
RSV A RNA SPEC QL NAA+PROBE: NEGATIVE
RSV B RNA SPEC QL NAA+PROBE: NEGATIVE
SODIUM SERPL-SCNC: 148 MMOL/L (ref 136–145)
SPECIMEN SOURCE: NORMAL
STATUS OF UNIT,%ST: NORMAL
UNIT DIVISION, %UDIV: 0
UNIT DIVISION, %UDIV: NORMAL
WBC # BLD AUTO: 9.5 K/UL (ref 4.3–11.1)

## 2017-06-23 PROCEDURE — 85730 THROMBOPLASTIN TIME PARTIAL: CPT | Performed by: INTERNAL MEDICINE

## 2017-06-23 PROCEDURE — 85384 FIBRINOGEN ACTIVITY: CPT | Performed by: INTERNAL MEDICINE

## 2017-06-23 PROCEDURE — 74011250636 HC RX REV CODE- 250/636: Performed by: INTERNAL MEDICINE

## 2017-06-23 PROCEDURE — P9059 PLASMA, FRZ BETWEEN 8-24HOUR: HCPCS | Performed by: INTERNAL MEDICINE

## 2017-06-23 PROCEDURE — 36430 TRANSFUSION BLD/BLD COMPNT: CPT

## 2017-06-23 PROCEDURE — 77010033678 HC OXYGEN DAILY

## 2017-06-23 PROCEDURE — 74011250637 HC RX REV CODE- 250/637: Performed by: PHYSICIAN ASSISTANT

## 2017-06-23 PROCEDURE — 74011250637 HC RX REV CODE- 250/637: Performed by: INTERNAL MEDICINE

## 2017-06-23 PROCEDURE — 85025 COMPLETE CBC W/AUTO DIFF WBC: CPT | Performed by: INTERNAL MEDICINE

## 2017-06-23 PROCEDURE — 99232 SBSQ HOSP IP/OBS MODERATE 35: CPT | Performed by: INTERNAL MEDICINE

## 2017-06-23 PROCEDURE — 65270000029 HC RM PRIVATE

## 2017-06-23 PROCEDURE — 83735 ASSAY OF MAGNESIUM: CPT | Performed by: INTERNAL MEDICINE

## 2017-06-23 PROCEDURE — 80053 COMPREHEN METABOLIC PANEL: CPT | Performed by: INTERNAL MEDICINE

## 2017-06-23 PROCEDURE — 71020 XR CHEST PA LAT: CPT

## 2017-06-23 PROCEDURE — 36514 APHERESIS PLASMA: CPT

## 2017-06-23 PROCEDURE — 94760 N-INVAS EAR/PLS OXIMETRY 1: CPT

## 2017-06-23 RX ORDER — SODIUM CHLORIDE 9 MG/ML
1000 INJECTION, SOLUTION INTRAVENOUS AS NEEDED
Status: DISCONTINUED | OUTPATIENT
Start: 2017-06-23 | End: 2017-06-29 | Stop reason: HOSPADM

## 2017-06-23 RX ADMIN — BACLOFEN 20 MG: 10 TABLET ORAL at 21:10

## 2017-06-23 RX ADMIN — Medication 10 ML: at 05:23

## 2017-06-23 RX ADMIN — Medication 5 ML: at 21:00

## 2017-06-23 RX ADMIN — OXYCODONE HYDROCHLORIDE 30 MG: 15 TABLET ORAL at 09:19

## 2017-06-23 RX ADMIN — DEXTROAMPHETAMINE SACCHARATE, AMPHETAMINE ASPARTATE, DEXTROAMPHETAMINE SULFATE AND AMPHETAMINE SULFATE 30 MG: 3.75; 3.75; 3.75; 3.75 TABLET ORAL at 16:27

## 2017-06-23 RX ADMIN — B-COMPLEX W/ C & FOLIC ACID TAB 1 TABLET: TAB at 09:19

## 2017-06-23 RX ADMIN — DEXTROAMPHETAMINE SACCHARATE, AMPHETAMINE ASPARTATE, DEXTROAMPHETAMINE SULFATE AND AMPHETAMINE SULFATE 30 MG: 3.75; 3.75; 3.75; 3.75 TABLET ORAL at 09:19

## 2017-06-23 RX ADMIN — BACLOFEN 20 MG: 10 TABLET ORAL at 09:19

## 2017-06-23 RX ADMIN — DESVENLAFAXINE 100 MG: 100 TABLET, EXTENDED RELEASE ORAL at 09:22

## 2017-06-23 RX ADMIN — Medication 5 ML: at 14:00

## 2017-06-23 RX ADMIN — PANTOPRAZOLE SODIUM 40 MG: 40 TABLET, DELAYED RELEASE ORAL at 05:23

## 2017-06-23 RX ADMIN — OXYCODONE HYDROCHLORIDE 30 MG: 15 TABLET ORAL at 16:31

## 2017-06-23 RX ADMIN — DIAZEPAM 10 MG: 5 TABLET ORAL at 03:35

## 2017-06-23 RX ADMIN — GABAPENTIN 1200 MG: 400 CAPSULE ORAL at 09:19

## 2017-06-23 RX ADMIN — LAMOTRIGINE 100 MG: 100 TABLET ORAL at 09:19

## 2017-06-23 RX ADMIN — GABAPENTIN 1200 MG: 400 CAPSULE ORAL at 16:27

## 2017-06-23 RX ADMIN — CYCLOPHOSPHAMIDE 150 MG: 50 CAPSULE ORAL at 20:58

## 2017-06-23 RX ADMIN — ACETAMINOPHEN 650 MG: 325 TABLET ORAL at 15:06

## 2017-06-23 RX ADMIN — DIAZEPAM 10 MG: 5 TABLET ORAL at 20:58

## 2017-06-23 RX ADMIN — DIAZEPAM 10 MG: 5 TABLET ORAL at 16:40

## 2017-06-23 RX ADMIN — OXYCODONE HYDROCHLORIDE 30 MG: 15 TABLET ORAL at 03:35

## 2017-06-23 RX ADMIN — BACLOFEN 20 MG: 10 TABLET ORAL at 16:27

## 2017-06-23 RX ADMIN — MIRTAZAPINE 30 MG: 15 TABLET, FILM COATED ORAL at 20:58

## 2017-06-23 RX ADMIN — DESVENLAFAXINE 100 MG: 100 TABLET, EXTENDED RELEASE ORAL at 16:30

## 2017-06-23 RX ADMIN — CALCIUM GLUCONATE: 94 INJECTION, SOLUTION INTRAVENOUS at 16:00

## 2017-06-23 RX ADMIN — DIAZEPAM 10 MG: 5 TABLET ORAL at 09:20

## 2017-06-23 RX ADMIN — DIPHENHYDRAMINE HYDROCHLORIDE 25 MG: 25 CAPSULE ORAL at 15:05

## 2017-06-23 RX ADMIN — GABAPENTIN 1200 MG: 400 CAPSULE ORAL at 20:57

## 2017-06-23 RX ADMIN — OXYCODONE HYDROCHLORIDE 30 MG: 15 TABLET ORAL at 20:58

## 2017-06-23 NOTE — PROGRESS NOTES
Inpatient Hematology / Oncology Progress Note      Admission Date: 6/15/2017 11:50 AM  Reason for Admission/Hospital Course: Lung mass [R91.8]  Dysphagia, unspecified [R13.10]  Esophageal stricture [K22.2]    24 Hour Events:  Afebrile. VSS  Feeling better today than yesterday  EGD and Day 3 PLEX yesterday with hypotension      ROS:  Constitutional: + fatigue Negative for fever, chills, weakness, malaise. CV: Positive for BLE edema; negative for chest pain, palpitations. Respiratory: Negative for dyspnea, cough, wheezing. GI: Negative for nausea, abdominal pain, diarrhea. 10 point review of systems is otherwise negative with the exception of the elements mentioned above in the HPI. No Known Allergies    OBJECTIVE:  Patient Vitals for the past 8 hrs:   BP Temp Pulse Resp SpO2   17 1527 113/75 98.7 °F (37.1 °C) 74 19 92 %   17 1135 109/71 98.5 °F (36.9 °C) 66 17 91 %     Temp (24hrs), Av.1 °F (36.7 °C), Min:97.4 °F (36.3 °C), Max:98.7 °F (37.1 °C)         Physical Exam:  Constitutional: Well developed, well nourished male in no acute distress, sitting  comfortably in the hospital bed. HEENT: Normocephalic and atraumatic. Oropharynx is clear, mucous membranes are moist. Sclerae anicteric. Neck supple    Skin Warm and dry. No bruising and no rash noted. No erythema. No pallor. Respiratory Lungs are clear to auscultation bilaterally without wheezes, rales or rhonchi, normal air exchange without accessory muscle use. CVS Normal rate, regular rhythm and normal S1 and S2. No murmurs, gallops, or rubs. Abdomen RUQ tenderness with palpation-he states is chronic. Soft and nondistended, normoactive bowel sounds. No palpable mass. Neuro Grossly nonfocal with no obvious sensory or motor deficits. MSK Normal range of motion in general.  No edema and no tenderness. Psych Appropriate mood and affect.     Right neck temp apheresis cath with small old blood noted to dressing    Labs:      Recent Labs      06/23/17   0600  06/22/17   0540  06/21/17   0550   WBC  9.5  14.2*  11.7*   RBC  4.02*  4.07*  4.48   HGB  10.4*  10.5*  11.3*   HCT  33.1*  33.0*  36.6*   MCV  82.3  81.1  81.7   MCH  25.9*  25.8*  25.2*   MCHC  31.4  31.8  30.9*   RDW  19.2*  18.4*  18.4*   PLT  297  310  369   GRANS  65  78  58   LYMPH  30  19  35   MONOS  4  2*  6   EOS  0*  1  0*   BASOS  0  0  0   IG  0.9  0.5  0.6   DF  AUTOMATED  AUTOMATED  AUTOMATED   ANEU  6.1  10.9*  6.9   ABL  2.9  2.7  4.1   ABM  0.4  0.3  0.7   YEMI  0.0  0.1  0.0   ABB  0.0  0.0  0.0   AIG  0.1  0.1  0.1        Recent Labs      06/23/17   0600  06/22/17   0540  06/21/17   0550   NA  148*  146*  147*   K  3.4*  3.5  3.6   CL  108*  107  109*   CO2  32  32  34*   AGAP  8  7  4*   GLU  98  72  79   BUN  10  13  19   CREA  0.72*  0.72*  0.97   GFRAA  >60  >60  >60   GFRNA  >60  >60  >60   CA  8.2*  8.1*  8.3   SGOT  23  20  47*   AP  70  73  94   TP  5.2*  5.1*  5.8*   ALB  2.4*  2.3*  2.6*   GLOB  2.8  2.8  3.2   AGRAT  0.9*  0.8*  0.8*   MG  2.2  2.0  2.1         Imaging:  CT Chest 6/15/17  HISTORY: Lower extremities pain and swelling. Pulmonary infiltrates.     EXAM: Contiguous axial CT images were obtained from the thoracic inlet to the  upper abdomen following the uneventful IV administration of 100 mL Isovue-370. Coronal reformations were performed.     Radiation dose reduction techniques were used for this study. All CT scans  performed at this facility use one or all of the following: Automated exposure  control, adjustment of the mA and/or kVp according to patient's size, and  iterative reconstruction.     PRIOR STUDY: 05/08/2017     FINDINGS: There is no evidence of pulmonary embolus through the subsegmental  level bilaterally. The main pulmonary artery is normal in size. There are  multiple mildly prominent mediastinal lymph nodes with a representative right  paratracheal lymph node measuring 18 x 10 mm.  There are nonenlarged hilar lymph  nodes bilaterally. There is no pericardial effusion.     There has been progressive consolidation and volume loss in the right upper lobe  with areas of air bronchograms as well as multiple cavitary foci. The  consolidative and cavitary process in the right lower lobe has also mildly  enlarged, now measuring 5.6 x 2.5 cm compared with 4.2 x 2.5 cm previously. There is a new 6 mm irregular, nodular density (image 72) located just anterior  and inferior to this larger process. There are minor groundglass opacities in  the left lower lobe, new from the prior exam. There is a background of  centrilobular emphysema. There is no pleural effusion. Several tiny nodular  densities in the right lung base (images 93-96) have developed since the prior  exam.     There are subcentimeter hypodensities in the left hepatic lobe. Several  calcified gallstones are present in the gallbladder. The remaining included  upper abdominal organs are unremarkable. There are no acute or concerning bony  lesions.     IMPRESSION  IMPRESSION:      1. Progressive consolidation and volume loss in the right upper lobe with new  areas of cavitation. The consolidative cavitary process in the right lower lobe  has also mildly enlarged, and several new irregular and groundglass nodules have  developed bilaterally. Findings are suggestive of an infectious process,  possibly fungal.     2. No evidence of pulmonary embolus.     3. Mildly prominent mediastinal lymph nodes which are likely reactive.     4. Cholelithiasis.         ASSESSMENT:    Problem List  Date Reviewed: 6/23/2017          Codes Class Noted    Schatzki's ring ICD-10-CM: K22.2  ICD-9-CM: 750.3  6/23/2017        Bradycardia ICD-10-CM: R00.1  ICD-9-CM: 427.89  6/20/2017        Severe protein-calorie malnutrition ([facility] [address] (Chronic) ICD-10-CM: E43  ICD-9-CM: 262  6/20/2017        Goodpasture syndrome ([address] ICD-10-CM: M31.0  ICD-9-CM: 446.21  6/17/2017        BPH (benign prostatic hyperplasia) (Chronic) ICD-10-CM: N40.0  ICD-9-CM: 600.00  6/16/2017        Anxiety and depression (Chronic) ICD-10-CM: F41.9, F32.9  ICD-9-CM: 300.00, 311  6/15/2017        History of hepatitis C (Chronic) ICD-10-CM: Z86.19  ICD-9-CM: V12.09  6/15/2017    Overview Signed 8/7/2012 10:55 AM by Pepper Masters III     S/p treatment             Chronic shoulder pain (Chronic) ICD-10-CM: M25.519, G89.29  ICD-9-CM: 719.41, 338.29  6/15/2017        HLD (hyperlipidemia) (Chronic) ICD-10-CM: E78.5  ICD-9-CM: 272.4  6/15/2017        * (Principal)Pulmonary cavitary lesion ICD-10-CM: J98.4  ICD-9-CM: 518.89  6/15/2017        Abnormal CXR ICD-10-CM: R93.8  ICD-9-CM: 793.2  6/15/2017        Elevated C-reactive protein (CRP) ICD-10-CM: R79.82  ICD-9-CM: 790.95  6/15/2017                PLAN:  Goodpasture syndrome   6/19: Mr Keen was informed of the apheresis process and purpose and is agreeable  IR was consulted today for temp cath placement. Once line is placed we will start plasmapheresis with the plan of daily for 5 days and then every other day. GBM antibody was 75 on admission. Per Pulmonary, he is scheduled for Bronch/EBUS tomorrow. He will continue on steroids and cytoxan.   6/21 S/p bronch yesterday and found white exudate false cords and diffuse inflammation, edema, erythema. BAL done and pending. Day 2 PLEX today. Plan to repeat levels in 1 week. Continue steroids/cytoxan  6/22 Clinically no changes. Day 3 PLEX today, will continue with steroids and cytoxan.   6/23 Day 4 PLEX today, continue steroids and cytoxan    Our plan is to complete 5 consecutive days PLEX and then every other day, we will recheck GBM antibody on day 7 which will help determine how many PLEX sessions he will require. If primary team is ready to discharge him he can complete PLEX  And follow up as an outpatient. Mr Osmani did voice concern of transportation to the cancer center every other day.      Valeri Carlos NP   [facility] Hematology & Oncology  [address] [address]  Office : [phone]  Fax : [phone]

## 2017-06-23 NOTE — PROGRESS NOTES
Procedure:  Therapeutic plasma exchange  Dx: Good pasture syndrome  Day: 4 of 5 ( daily, then every other day)  Labs drawn: CBC, CMP,PTT, FIBRINOGEN  Calcium used:   4 grams  Replacement product: FFP  Replace volume: 3446  Remove volume: 3662   TBV processed: 100%  TPV 1.2  [facility] blood tubing lot # (if FFP used): [id number]  Issues: None  Next procedure date: 6/24/17  Labs needed: per protocol    Report given to cathi Llanes RN

## 2017-06-23 NOTE — PROGRESS NOTES
Iris Castillo Adcox  Admission Date: 6/15/2017             Daily Progress Note: 6/23/2017    The patient's chart is reviewed and the patient is discussed with the staff. 62 yo male presents with chills, shortness of breath, and edema. CT chest 4/21/17 revealed RLL 4.4 cm x 3.1cm density. Had chest CT 5/8/17 with multiple large infiltrative nodules in R lung concerning for cavitation. Was seen 6/8 by Dr. Henok Lala and started on Levaquin on 6/9. Was seen by Dr. Henok Lala the morning of admission, was not any better,hypoxic on RA, and was referred for admission / ongoing workup of cavitary lesion. History of smoking <1 ppd x 10 years off and on, quit in 2014. CRP elevated at 283. Anti GBM Ab +- initiated on Cyclophosphamide and pulse dose steroid 06/17/17. Nephrology consulted with no plans for renal bx at this time (normal renal function / no blood or microscopic protein in urine). Also seen by heme/Onc with plans for plasma pheresis. Chronic depression, anxiety, ADD,Hepatitis C, BPH, and chronic pain. Has been feeling poorly, lost 40lbs over 3 months secondary to anorexia. Weaned to room air. Subjective:      EGD yesterday. Vaping in the room--has been asked to remove by staff. Currently feels better on ra.   Minimal cough and no sob    Current Facility-Administered Medications   Medication Dose Route Frequency    lactated Ringers infusion  100 mL/hr IntraVENous CONTINUOUS    heparin (PF) 2 units/ml in NS infusion 2,000 Units  1,000 mL Irrigation Multiple    sodium chloride (NS) flush 5-10 mL  5-10 mL IntraVENous Q8H    sodium chloride (NS) flush 5-10 mL  5-10 mL IntraVENous PRN    lidocaine (XYLOCAINE) 2 % jelly   Mucous Membrane PRN    acetaminophen (TYLENOL) tablet 650 mg  650 mg Oral Q4H PRN    diphenhydrAMINE (BENADRYL) capsule 25 mg  25 mg Oral Q6H PRN    cyclophosphamide (CYTOXAN) chemo capsule 150 mg  150 mg Oral Q24H    dextroamphetamine-amphetamine (ADDERALL) tablet 30 mg  30 mg Oral TID    baclofen (LIORESAL) tablet 20 mg  20 mg Oral TID    DESVENLAFAXINE SUCCINATE (PRISTIQ PO) - patient supplied  100 mg Oral BID    doxazosin (CARDURA) tablet 4 mg  4 mg Oral DAILY    gabapentin (NEURONTIN) capsule 1,200 mg  1,200 mg Oral TID    lamoTRIgine (LaMICtal) tablet 100 mg  100 mg Oral DAILY    mirtazapine (REMERON) tablet 30 mg  30 mg Oral QHS    multivitamin, stress formula (STRESS TAB) tablet 1 Tab  1 Tab Oral DAILY    pantoprazole (PROTONIX) tablet 40 mg  40 mg Oral ACB    oxyCODONE IR (OXY-IR) immediate release tablet 30 mg  30 mg Oral Q4H PRN    diazePAM (VALIUM) tablet 10 mg  10 mg Oral Q4H PRN    sodium chloride (NS) flush 5-10 mL  5-10 mL IntraVENous Q8H    sodium chloride (NS) flush 5-10 mL  5-10 mL IntraVENous PRN    albuterol (PROVENTIL VENTOLIN) nebulizer solution 2.5 mg  2.5 mg Nebulization Q6H PRN    acetaminophen (TYLENOL) tablet 650 mg  650 mg Oral Q4H PRN    naloxone (NARCAN) injection 0.4 mg  0.4 mg IntraVENous PRN    ondansetron (ZOFRAN) injection 4 mg  4 mg IntraVENous Q4H PRN    senna-docusate (PERICOLACE) 8.6-50 mg per tablet 1 Tab  1 Tab Oral BID PRN       Review of Systems  Constitutional: negative for fever, chills, sweats  Cardiovascular: trace LE edema, negative for chest pain, palpitations, syncope  Gastrointestinal:  negative for dysphagia, reflux, vomiting, diarrhea, abdominal pain, or melena  Neurologic:  negative for focal weakness, numbness, headache    Objective:     Vitals:    06/22/17 2020 06/22/17 2249 06/23/17 0320 06/23/17 0624   BP: 95/63 94/56 102/67    Pulse: (!) 56 77 69    Resp: 18 20 19    Temp: 98.3 °F (36.8 °C) 97.7 °F (36.5 °C) 97.4 °F (36.3 °C)    SpO2: 92% 93% 90%    Weight:    177 lb 6.4 oz (80.5 kg)   Height:         Intake and Output:   06/21 1901 - 06/23 0700  In: 4812 [P.O.:480; I.V.:504]  Out: 4175 [Urine:400]       Physical Exam:   Constitution:  the patient is well developed and in no acute distress, room air  EENMT: Sclera clear, pupils equal, oral mucosa moist  Respiratory: diminished, clear to auscultation bilaterally, no sputum production  Cardiovascular:  RRR without M,G,R  Gastrointestinal: soft and non-tender; with positive bowel sounds. Musculoskeletal: warm without cyanosis. There is no lower leg edema. Skin:  no jaundice or rashes, no open wounds   Neurologic: no gross neuro deficits     Psychiatric:  alert and oriented x 3    CHEST XRAY: None today    Chest CT 6/15/17:                LAB   Recent Labs      06/23/17   0600  06/22/17   0540  06/21/17   0550  06/20/17   1512   WBC  9.5  14.2*  11.7*  10.3   HGB  10.4*  10.5*  11.3*  10.5*   HCT  33.1*  33.0*  36.6*  33.6*   PLT  297  310  369  415   INR   --    --   1.1  1.2     Recent Labs      06/22/17   0540  06/21/17   0550  06/20/17   1512   NA  146*  147*  146*   K  3.5  3.6  3.5   CL  107  109*  109*   CO2  32  34*  30   GLU  72  79  75   BUN  13  19  15   CREA  0.72*  0.97  0.78*   MG  2.0  2.1   --    CA  8.1*  8.3  8.8   ALB  2.3*  2.6*   --    TBILI  0.3  0.2   --    ALT  25  38   --    SGOT  20  47*   --      No results for input(s): PH, PCO2, PO2, HCO3 in the last 72 hours. No results for input(s): LCAD, LAC in the last 72 hours.       Assessment:  (Medical Decision Making)     Patient Active Problem List   Diagnosis Code    Anxiety and depression F41.9, F32.9    History of hepatitis C Z86.19    Chronic shoulder pain M25.519, G89.29    HLD (hyperlipidemia) E78.5    BPH (benign prostatic hyperplasia) N40.0    Pulmonary cavitary lesion J98.4    Abnormal CXR R93.8    Elevated C-reactive protein (CRP) R79.82    Goodpasture syndrome (HCC) M31.0    Bradycardia R00.1    Severe protein-calorie malnutrition (HCC) E43         Plan:  (Medical Decision Making)     Hospital Problems  Date Reviewed: 6/23/2017          Codes Class Noted POA    BPH (benign prostatic hyperplasia) (Chronic) ICD-10-CM: N40.0  ICD-9-CM: 600.00  6/16/2017 Yes    chronic    * (Principal)Pulmonary cavitary lesion ICD-10-CM: J98.4  ICD-9-CM: 518.89  6/15/2017 Yes    Navigational bronch / EBUS scheduled for 6/20  Continue abx   CRP and anti-GBM ab elevated  So far bronch results neg. Abnormal CXR ICD-10-CM: R93.8  ICD-9-CM: 793.2  6/15/2017 Yes    With cavitary lesions    Elevated C-reactive protein (CRP) ICD-10-CM: R79.82  ICD-9-CM: 790.95  6/15/2017 Yes    Burst dose steroids completed    Anxiety and depression (Chronic) ICD-10-CM: F41.9, F32.9  ICD-9-CM: 300.00, 311  8/7/2012 Yes    Chronic--taking Valium      History of hepatitis C ICD-10-CM: Z86.19  ICD-9-CM: V12.09  8/7/2012 Yes     S/p treatment         Chronic shoulder pain (Chronic) ICD-10-CM: M25.519, G89.29  ICD-9-CM: 719.41, 338.29  8/7/2012 Yes    Chronic pain    HLD (hyperlipidemia) (Chronic) ICD-10-CM: E78.5  ICD-9-CM: 272.4  8/7/2012 Yes    chronic       --anti-GBM ab:  Glomerular Basement Membrane Ab 75 (H) 0 - 20 Final   --LR at 100 ml/hr  --Likely Goodpasteur's syndrome    pulse dose steroids and cyclophosphamide(2mh/kg/day) - started 6/17, completed 6/19    UA without microscopic protein or RBC. --Heme/Onc:  Plasma exchange for Anti-GBM Ab for possible 2-3 weeks. On Cytoxan  --Cardiology consulted for bradycardia-felt to be benign, ECHO pending today  --Bronch 6/20: White exudate false cords, diffuse inflammation, edema, erythema. --BAL pending  --ID following  --GI following, had EGD with esophageal dilation and noted with thick esophageal secretions. More than 50% of the time documented was spent in face-to-face contact with the patient and in the care of the patient on the floor/unit where the patient is located. Mai [place], NP   Lungs:  clear  Heart:  RRR with no Murmur/Rubs/Gallops    Additional Comments: Will check cxr,  Only reason he is here now is for plasmapheresis    I have spoken with and examined the patient. I agree with the above assessment and plan as documented.     India Akbar MD [facility]

## 2017-06-23 NOTE — PROGRESS NOTES
6/23/2017    Admit Date: 6/15/2017    Subjective:   CHIEF COMPLAINT- dysphagia  HPI      Overall-better           Diet-reg    Appetite-good     Nausea-no   Vomiting-no          Pain-no            BM-yes   Medications-reviewed and adjusted as appropriate   IV FLUIDS-reviewed      FAM HX-per H&P   SH-per H&P   \  Past Medical History:   Diagnosis Date    Anxiety and depression 8/7/2012    BPH (benign prostatic hyperplasia) 8/7/2012    Chronic shoulder pain 8/7/2012    Colon polyps     Hepatitis C     diagnosed 10/08    History of hepatitis C 8/7/2012    History of hepatitis C 8/7/2012    HLD (hyperlipidemia) 8/7/2012    Other ill-defined conditions     had abscess on lung; liver problems    Psychiatric disorder     depression; anxiety               Past Surgical History:   Procedure Laterality Date    HX ORTHOPAEDIC      shoulders x4    HX OTHER SURGICAL      abscess removed from bilateral lungs           Objective:     Blood pressure 102/67, pulse 70, temperature 97.6 °F (36.4 °C), resp. rate 18, height 5' 7\" (1.702 m), weight 80.5 kg (177 lb 6.4 oz), SpO2 93 %.       Intake/Output Summary (Last 24 hours) at 06/23/17 0759  Last data filed at 06/22/17 1810   Gross per 24 hour   Intake             4812 ml   Output             4175 ml   Net              637 ml       EXAM:     NEURO-a&o    HEENT-wnl    LUNGS-clear       COR-rrr        ABD-soft , min tenderness, no rebound, good bs        EXT-no edema                         LABS-  Lab Results   Component Value Date/Time    WBC 9.5 06/23/2017 06:00 AM    RBC 4.02 06/23/2017 06:00 AM    HGB 10.4 06/23/2017 06:00 AM    HCT 33.1 06/23/2017 06:00 AM    PLATELET 106 12/49/0328 06:00 AM     Lab Results   Component Value Date/Time    Sodium 146 06/22/2017 05:40 AM    Potassium 3.5 06/22/2017 05:40 AM    Chloride 107 06/22/2017 05:40 AM    CO2 32 06/22/2017 05:40 AM    Anion gap 7 06/22/2017 05:40 AM    Glucose 72 06/22/2017 05:40 AM    BUN 13 06/22/2017 05:40 AM Creatinine 0.72 06/22/2017 05:40 AM    GFR est AA >60 06/22/2017 05:40 AM    GFR est non-AA >60 06/22/2017 05:40 AM    Calcium 8.1 06/22/2017 05:40 AM    Magnesium 2.0 06/22/2017 05:40 AM    Albumin 2.3 06/22/2017 05:40 AM    Bilirubin, total 0.3 06/22/2017 05:40 AM    Protein, total 5.1 06/22/2017 05:40 AM    Globulin 2.8 06/22/2017 05:40 AM    A-G Ratio 0.8 06/22/2017 05:40 AM    AST (SGOT) 20 06/22/2017 05:40 AM    ALT (SGPT) 25 06/22/2017 05:40 AM          -    Assessment:     Principal Problem:    Pulmonary cavitary lesion (6/15/2017)    Active Problems:    Anxiety and depression (6/15/2017)      History of hepatitis C (6/15/2017)      Overview: S/p treatment      Chronic shoulder pain (6/15/2017)      HLD (hyperlipidemia) (6/15/2017)      BPH (benign prostatic hyperplasia) (6/16/2017)      Abnormal CXR (6/15/2017)      Elevated C-reactive protein (CRP) (6/15/2017)      Goodpasture syndrome ([address] (6/17/2017)      Bradycardia (6/20/2017)      Severe protein-calorie malnutrition ([address] (6/20/2017)      Schatzki's ring (6/23/2017)    post dilation doing better    Plan:     Routine outpt f/u in 3 months  No further plans at this time. Will sign off. Please call if any problems. PT SEEN AND EXAMINED AND PLAN DISCUSSED AND IMPLEMENTED.   Cecilia Macdonald MD

## 2017-06-23 NOTE — PROGRESS NOTES
Shift assessment complete. Pt resting quietly in bed. Alert and oriented x4. IVF bolus infusing. No signs of acute distress. Pt c/o of pain and anxiety, will medicate when time. BP running low, will monitor. Call light within reach. Pt instructed to call for assistance.

## 2017-06-23 NOTE — PROGRESS NOTES
Patient in bed resting with no complaints at this time. Patient is alert and orientated with no distress noted. IV intact and patent with no s/s of infection noted. Respirations even and unlabored with heart rate regular. Patient states yesterday kicked my butt, I hope its better today. Patient able to ambulate independently without assistance. Bed in low locked position with call light within reach. Patient instructed to call if assistance is needed. Will continue to monitor.

## 2017-06-23 NOTE — PROGRESS NOTES
Patient relaxing in bed watching TV. Patient ready for tx and states its going to go better than yesterday. Call light within reach. Will continue to monitor. Patient going to xray.

## 2017-06-23 NOTE — PROGRESS NOTES
Infectious Disease Consult    Today's Date: 2017   Admit Date: 6/15/2017    Impression:   · Tick Bite: occurred 1-2 months ago and was not associated with any febrile illness. Very unlikely to be related to patient's pulmonary findings and anti-GBM serology. Would not pursue any workup related to this  · Cavitary Lung Disease: s/p Bronch yesterday; so far routine culture negative; PJP negative; AFB smear negative; serum crypto ag negative; pathology negative; HSV and CMV negative. HIV serology negative  · HCV: patient recalls being treated several years ago with daily interferon and states all testing done afterwards was negative; would confirm with HCV PCR; last HCV PCR in computer from 2009 and VL was 22,000. · Anti-GBM Disease    Plan:   ·  Await galactomannan (serum and BAL); fungitell; TB quantiferon and HCV VL  · Would continue to observe off antibiotics  We will continue to see pt, and f/o on Monday unless otherwise called. Anti-infectives:     Inpat Anti-Infectives     Start     Ordered Stop    06/15/17 1500  cefTRIAXone (ROCEPHIN) 1 g in 0.9% sodium chloride (MBP/ADV) 50 mL  1 g,   IntraVENous,   EVERY 24 HOURS      06/15/17 1408 --    06/15/17 1500  azithromycin (ZITHROMAX) 500 mg in 0.9% sodium chloride (MBP/ADV) 250 mL  500 mg,   IntraVENous,   EVERY 24 HOURS      06/15/17 1408 --          Subjective:   Date of Consultation:  2017  Referring Physician: Samira Phillips  Eating lunch; swallowing better. Had EGD with balloon dilation along with plasmaphoresis run yesterday. No Known Allergies     Review of Systems:  A comprehensive review of systems was negative except for that written in the History of Present Illness.     Objective:     Visit Vitals    /71    Pulse 66    Temp 98.5 °F (36.9 °C)    Resp 17    Ht 5' 7\" (1.702 m)    Wt 80.5 kg (177 lb 6.4 oz)    SpO2 91%    BMI 27.78 kg/m2     Temp (24hrs), Av.1 °F (36.7 °C), Min:97.4 °F (36.3 °C), Max:98.5 °F (36.9 °C) Lines:  Central Venous Catheter:  Right IJ phoresis catheter (06/20/17)          Physical Exam:    General:  Alert, cooperative, well noursished, well developed, appears stated age   Eyes:  Sclera anicteric. Pupils equally round and reactive to light. Mouth/Throat: Mucous membranes normal, oral pharynx clear, edentulous   Neck: Supple   Lungs:   Clear to auscultation bilaterally, good effort   CV:  Regular rate and rhythm,no murmur, click, rub or gallop   Abdomen:   Soft, non-tender.  bowel sounds normal. non-distended   Extremities: No cyanosis or edema   Skin: Skin color, texture, turgor normal. no acute rash or lesions; back area without any evidence of previous tick bite, no specific site identified   Lymph nodes: Cervical and supraclavicular normal   Musculoskeletal: No swelling or deformity   Lines/Devices:  Intact, no erythema, drainage or tenderness   Psych: Alert and oriented, normal mood affect given the setting         Data Review:     CBC:  Recent Labs      06/23/17   0600  06/22/17   0540  06/21/17   0550   WBC  9.5  14.2*  11.7*   GRANS  65  78  58   MONOS  4  2*  6   EOS  0*  1  0*   ANEU  6.1  10.9*  6.9   ABL  2.9  2.7  4.1   HGB  10.4*  10.5*  11.3*   HCT  33.1*  33.0*  36.6*   PLT  297  310  369       BMP:  Recent Labs      06/23/17   0600  06/22/17   0540  06/21/17   0550   CREA  0.72*  0.72*  0.97   BUN  10  13  19   NA  148*  146*  147*   K  3.4*  3.5  3.6   CL  108*  107  109*   CO2  32  32  34*   AGAP  8  7  4*   GLU  98  72  79       LFTS:  Recent Labs      06/23/17   0600  06/22/17   0540  06/21/17   0550   TBILI  0.2  0.3  0.2   ALT  23  25  38   SGOT  23  20  47*   AP  70  73  94   TP  5.2*  5.1*  5.8*   ALB  2.4*  2.3*  2.6*       Microbiology:     All Micro Results     Procedure Component Value Units Date/Time    ANAEROBIC CULTURE - ONLY PERFORMED ON BRONCHIAL BRUSHING [[id number]] Collected:  06/20/17 1410    Order Status:  Completed Specimen:  Bronchial lavage Updated:  06/23/17 1410     Special Requests: NO SPECIAL REQUESTS        Culture result:         NO ANAEROBIC GROWTH IN 3 DAYS    CYTOMEGALOVIRUS (CMV) CULTURE [[id number]] Collected:  06/20/17 1410    Order Status:  Completed Specimen:  Miscellaneous sample Updated:  06/23/17 0639     Source BRONCHIAL LAVAGE        Cytomegalovirus (CMV) Culture Comment         (NOTE)  Preliminary Report:  No Cytomegalovirus isolated at 24 hours. Next report to follow  after 1 week. Performed At: [facility]  [employer], [place] [id number]  Jaxson Benson MD ZX:[id number]         PNEUMOCYSTIS JIROVECI - PCR [[id number]] Collected:  06/20/17 1410    Order Status:  Completed Specimen:  Miscellaneous sample Updated:  06/23/17 0627     Specimen source BRONCHIAL LAVAGE        P. jiroveci - PCR         RESULTS SCANNED IN [facility]    HSV W/ TYPING: CULTURE [[id number]] Collected:  06/20/17 1410    Order Status:  Completed Specimen:  Miscellaneous sample Updated:  06/22/17 2141     Source BRONCHIAL LAVAGE        HSV culture w typing Comment         (NOTE)  Negative  No Herpes simplex virus isolated. Performed At: [facility]  [employer], [place] [id number]  Jaxson Benson MD [id number]         [facility] CULTURE AND SMEAR [facility] [[id number]] Collected:  06/20/17 1410    Order Status:  Completed Specimen:  Other from Miscellaneous sample Updated:  06/22/17 2106     Source BRONCHIAL LAVAGE         Corrected on 06/20 AT 1628: This is a correction to the medical record of the test results previously reported as BIOPSY RUL, Corrected on 06/20 AT 1524:  This is a correction to the medical record of the test results previously reported as BRONCHIAL LAVAGE        Fungus stain Direct Inoculation     Fungus (Mycology) Culture Other source received      (NOTE)  Performed At: [facility]  [employer], [place] [id number]  Jaxson Benson MD Maori:[id number]         FUNGUS CULTURE AND SMEAR - Zach Tijerina [[id number]] Collected:  06/20/17 1410    Order Status:  Completed Specimen:  Other from Miscellaneous sample Updated:  06/22/17 2106     Source BRONCHIAL LAVAGE         Corrected on 06/20 AT 1548:  This is a correction to the medical record of the test results previously reported as LUNG BIOPSY        Fungus stain Direct Inoculation     Fungus (Mycology) Culture Other source received      (NOTE)  Performed At: [facility]  [employer] [address] [id number]  Mickie Koch MD XF:[id number]         PNEUMOCYSTIS JIROVECI - PCR [[id number]] Collected:  06/20/17 1410    Order Status:  Completed Specimen:  Miscellaneous sample Updated:  06/22/17 1429     Specimen source BRONCHIAL LAVAGE        P. jiroveci - PCR         RESULTS SCANNED IN [facility]    CRYPTOCOCCUS AG W/REFLEX TITER [[id number]] Collected:  06/22/17 0540    Order Status:  Completed Specimen:  Serum from Serum Updated:  06/22/17 1326     Cryptococcus Ag NEGATIVE        CULTURE, RESPIRATORY/SPUTUM/BRONCH Canda Oven STAIN [[id number]] Collected:  06/20/17 1410    Order Status:  Completed Specimen:  Sputum from Bronchial lavage Updated:  06/22/17 1016     Special Requests: NO SPECIAL REQUESTS        GRAM STAIN NO  WBCS SEEN         NO DEFINITE ORGANISM SEEN         NO  EPITHELIAL CELLS SEEN        Culture result: NO GROWTH 2 DAYS       QUANTIFERON TB GOLD [[id number]] Collected:  06/21/17 1800    Order Status:  Canceled Specimen:  Whole Blood from Blood Updated:  06/21/17 1827    AFB (MYCOBACTERIUM) CULTURE & SMEAR W/REFLEX ID - Joshua Lim [[id number]] Collected:  06/20/17 1410    Order Status:  Completed Specimen:  Miscellaneous sample Updated:  06/21/17 1637     Source BRONCHIAL LAVAGE        AFB Specimen processing Concentration     Acid Fast Smear NEGATIVE          (NOTE)  Performed At: [facility]  [employer] [address] [place] [id number]  Mickie Koch MD LN:[id number]          Acid Fast Culture PENDING RESPIRATORY VIRAL PANEL, PCR [[id number]] Collected:  06/20/17 1410    Order Status:  Completed Updated:  06/20/17 1620    CMV BY PCR, QT, BAL [[id number]] Collected:  06/20/17 1410    Order Status:  Completed Updated:  06/20/17 1555    ADENOVIRUS BY PCR, BAL [[id number]] Collected:  06/20/17 1410    Order Status:  Completed Updated:  06/20/17 1539    A. GALACTOMANNAN BY EIA, BRONCHOALVEOLAR LAVAGE [[id number]] Collected:  06/20/17 1410    Order Status:  Completed Updated:  06/20/17 1539    ATYPICAL PNEUMONIA BY PCR,BRONCHOALVEOLAR LAVAGE - INCLUDES: MYCOPLASMA PNEUMONIAE AND CHLAMYDIA PNEUMONIAE [[id number]] Collected:  06/20/17 1410    Order Status:  Completed Updated:  06/20/17 1539    CMV BY PCR, QT [[id number]] Collected:  06/20/17 1410    Order Status:  Canceled Specimen:  Blood Updated:  06/20/17 1539    L. PNEUMOPHILA BY Eugenia Mc [[id number]] Collected:  06/20/17 1410    Order Status:  Completed Updated:  06/20/17 1539    CULTURE, BLOOD [[id number]] Collected:  06/15/17 1458    Order Status:  Completed Specimen:  Blood from Blood Updated:  06/20/17 0830     Special Requests: LEFT ANTECUBITAL        Culture result: NO GROWTH 5 DAYS       CULTURE, BLOOD [[id number]] Collected:  06/15/17 1500    Order Status:  Completed Specimen:  Blood from Blood Updated:  06/20/17 0830     Special Requests: RIGHT ANTECUBITAL        Culture result: NO GROWTH 5 DAYS             Imaging:   Chest CT (06/15/17): IMPRESSION  IMPRESSION:      1. Progressive consolidation and volume loss in the right upper lobe with new  areas of cavitation. The consolidative cavitary process in the right lower lobe  has also mildly enlarged, and several new irregular and groundglass nodules have  developed bilaterally. Findings are suggestive of an infectious process,  possibly fungal.     2. No evidence of pulmonary embolus.     3. Mildly prominent mediastinal lymph nodes which are likely reactive.     4. Cholelithiasis.     Signed By: Milton Yip MD     June 23, 2017

## 2017-06-23 NOTE — PROGRESS NOTES
Problem: Nutrition Deficit  Goal: *Optimize nutritional status  Nutrition F/U: Day 8  Assessment:   Diet order(s): GI Soft  Food/Nutrition Patient History:  Patient is s/p esophageal dilation yesterday. Reports swallowing has improved. States that his appetite is doing well and that he has no concerns over food or nutrition at this time. Anthropometrics:Height: 5' 7\" (170.2 cm),  Weight: 80.5 kg (177 lb),  Body mass index is 27.8 kg/(m^2). Macronutrient needs:  EER:  1408-2940 kcal /day (25-30 kcal/kg actual BW)  EPR:  67-87 grams protein/day (1-1.3 grams/kg IBW)  Intake/Comparative Standards: Average intake for past 13 recorded meal(s): 92%. This potentially meets ~100% of kcal and ~100% of protein needs      Intervention:  Meals and snacks: Continue current diet. Nutrition Supplement Therapy: Continue mighty shake BID  Nutrition Discharge Plan: None at this time, patient is meeting nutritional needs without supplementation. Compa Reddy [address], [address] [place] [address],  [phone]

## 2017-06-24 LAB
ALBUMIN SERPL BCP-MCNC: 2.7 G/DL (ref 3.5–5)
ALBUMIN/GLOB SERPL: 1 {RATIO} (ref 1.2–3.5)
ALP SERPL-CCNC: 71 U/L (ref 50–136)
ALT SERPL-CCNC: 23 U/L (ref 12–65)
ANION GAP BLD CALC-SCNC: 5 MMOL/L (ref 7–16)
APTT PPP: 31.2 SEC (ref 23.5–31.7)
AST SERPL W P-5'-P-CCNC: 21 U/L (ref 15–37)
BASOPHILS # BLD AUTO: 0 K/UL (ref 0–0.2)
BASOPHILS # BLD: 0 % (ref 0–2)
BILIRUB SERPL-MCNC: 0.4 MG/DL (ref 0.2–1.1)
BLD PROD TYP BPU: NORMAL
BPU ID: NORMAL
BUN SERPL-MCNC: 7 MG/DL (ref 6–23)
CALCIUM SERPL-MCNC: 8.2 MG/DL (ref 8.3–10.4)
CHLORIDE SERPL-SCNC: 106 MMOL/L (ref 98–107)
CO2 SERPL-SCNC: 33 MMOL/L (ref 21–32)
CREAT SERPL-MCNC: 0.69 MG/DL (ref 0.8–1.5)
DIFFERENTIAL METHOD BLD: ABNORMAL
EOSINOPHIL # BLD: 0 K/UL (ref 0–0.8)
EOSINOPHIL NFR BLD: 0 % (ref 0.5–7.8)
ERYTHROCYTE [DISTWIDTH] IN BLOOD BY AUTOMATED COUNT: 19 % (ref 11.9–14.6)
FIBRINOGEN PPP-MCNC: 289 MG/DL (ref 172–437)
GLOBULIN SER CALC-MCNC: 2.7 G/DL (ref 2.3–3.5)
GLUCOSE SERPL-MCNC: 81 MG/DL (ref 65–100)
HCT VFR BLD AUTO: 31.6 % (ref 41.1–50.3)
HGB BLD-MCNC: 10 G/DL (ref 13.6–17.2)
IMM GRANULOCYTES # BLD: 0.1 K/UL (ref 0–0.5)
IMM GRANULOCYTES NFR BLD AUTO: 0.9 % (ref 0–5)
LYMPHOCYTES # BLD AUTO: 21 % (ref 13–44)
LYMPHOCYTES # BLD: 2.5 K/UL (ref 0.5–4.6)
MAGNESIUM SERPL-MCNC: 2 MG/DL (ref 1.8–2.4)
MCH RBC QN AUTO: 26 PG (ref 26.1–32.9)
MCHC RBC AUTO-ENTMCNC: 31.6 G/DL (ref 31.4–35)
MCV RBC AUTO: 82.1 FL (ref 79.6–97.8)
MONOCYTES # BLD: 0.3 K/UL (ref 0.1–1.3)
MONOCYTES NFR BLD AUTO: 3 % (ref 4–12)
NEUTS SEG # BLD: 8.8 K/UL (ref 1.7–8.2)
NEUTS SEG NFR BLD AUTO: 75 % (ref 43–78)
PLATELET # BLD AUTO: 284 K/UL (ref 150–450)
PMV BLD AUTO: 8.8 FL (ref 10.8–14.1)
POTASSIUM SERPL-SCNC: 3.6 MMOL/L (ref 3.5–5.1)
PROT SERPL-MCNC: 5.4 G/DL (ref 6.3–8.2)
RBC # BLD AUTO: 3.85 M/UL (ref 4.23–5.67)
SODIUM SERPL-SCNC: 144 MMOL/L (ref 136–145)
STATUS OF UNIT,%ST: NORMAL
UNIT DIVISION, %UDIV: NORMAL
WBC # BLD AUTO: 11.7 K/UL (ref 4.3–11.1)

## 2017-06-24 PROCEDURE — 74011250637 HC RX REV CODE- 250/637: Performed by: INTERNAL MEDICINE

## 2017-06-24 PROCEDURE — 36514 APHERESIS PLASMA: CPT

## 2017-06-24 PROCEDURE — 36430 TRANSFUSION BLD/BLD COMPNT: CPT

## 2017-06-24 PROCEDURE — 74011250636 HC RX REV CODE- 250/636: Performed by: NURSE PRACTITIONER

## 2017-06-24 PROCEDURE — 36415 COLL VENOUS BLD VENIPUNCTURE: CPT | Performed by: INTERNAL MEDICINE

## 2017-06-24 PROCEDURE — 80053 COMPREHEN METABOLIC PANEL: CPT | Performed by: INTERNAL MEDICINE

## 2017-06-24 PROCEDURE — 74011250636 HC RX REV CODE- 250/636: Performed by: INTERNAL MEDICINE

## 2017-06-24 PROCEDURE — 83735 ASSAY OF MAGNESIUM: CPT | Performed by: INTERNAL MEDICINE

## 2017-06-24 PROCEDURE — 99232 SBSQ HOSP IP/OBS MODERATE 35: CPT | Performed by: INTERNAL MEDICINE

## 2017-06-24 PROCEDURE — 74011250637 HC RX REV CODE- 250/637: Performed by: PHYSICIAN ASSISTANT

## 2017-06-24 PROCEDURE — 85384 FIBRINOGEN ACTIVITY: CPT | Performed by: INTERNAL MEDICINE

## 2017-06-24 PROCEDURE — 85730 THROMBOPLASTIN TIME PARTIAL: CPT | Performed by: INTERNAL MEDICINE

## 2017-06-24 PROCEDURE — 65270000029 HC RM PRIVATE

## 2017-06-24 PROCEDURE — 85025 COMPLETE CBC W/AUTO DIFF WBC: CPT | Performed by: INTERNAL MEDICINE

## 2017-06-24 PROCEDURE — P9059 PLASMA, FRZ BETWEEN 8-24HOUR: HCPCS | Performed by: INTERNAL MEDICINE

## 2017-06-24 PROCEDURE — 74011250636 HC RX REV CODE- 250/636: Performed by: PHYSICIAN ASSISTANT

## 2017-06-24 RX ORDER — SODIUM CHLORIDE 9 MG/ML
250 INJECTION, SOLUTION INTRAVENOUS AS NEEDED
Status: DISCONTINUED | OUTPATIENT
Start: 2017-06-24 | End: 2017-06-29 | Stop reason: HOSPADM

## 2017-06-24 RX ADMIN — ONDANSETRON 4 MG: 2 INJECTION INTRAMUSCULAR; INTRAVENOUS at 18:28

## 2017-06-24 RX ADMIN — CALCIUM GLUCONATE: 94 INJECTION, SOLUTION INTRAVENOUS at 16:14

## 2017-06-24 RX ADMIN — BACLOFEN 20 MG: 10 TABLET ORAL at 09:07

## 2017-06-24 RX ADMIN — DOXAZOSIN 4 MG: 4 TABLET ORAL at 09:06

## 2017-06-24 RX ADMIN — DIPHENHYDRAMINE HYDROCHLORIDE 25 MG: 25 CAPSULE ORAL at 14:30

## 2017-06-24 RX ADMIN — SODIUM CHLORIDE 500 ML: 900 INJECTION, SOLUTION INTRAVENOUS at 17:00

## 2017-06-24 RX ADMIN — DEXTROAMPHETAMINE SACCHARATE, AMPHETAMINE ASPARTATE, DEXTROAMPHETAMINE SULFATE AND AMPHETAMINE SULFATE 30 MG: 3.75; 3.75; 3.75; 3.75 TABLET ORAL at 09:06

## 2017-06-24 RX ADMIN — BACLOFEN 20 MG: 10 TABLET ORAL at 22:27

## 2017-06-24 RX ADMIN — GABAPENTIN 1200 MG: 400 CAPSULE ORAL at 09:07

## 2017-06-24 RX ADMIN — GABAPENTIN 1200 MG: 400 CAPSULE ORAL at 17:39

## 2017-06-24 RX ADMIN — DIAZEPAM 10 MG: 5 TABLET ORAL at 09:06

## 2017-06-24 RX ADMIN — PANTOPRAZOLE SODIUM 40 MG: 40 TABLET, DELAYED RELEASE ORAL at 05:22

## 2017-06-24 RX ADMIN — DESVENLAFAXINE 100 MG: 100 TABLET, EXTENDED RELEASE ORAL at 17:47

## 2017-06-24 RX ADMIN — ACETAMINOPHEN 650 MG: 325 TABLET ORAL at 14:30

## 2017-06-24 RX ADMIN — OXYCODONE HYDROCHLORIDE 30 MG: 15 TABLET ORAL at 18:28

## 2017-06-24 RX ADMIN — LAMOTRIGINE 100 MG: 100 TABLET ORAL at 09:07

## 2017-06-24 RX ADMIN — MIRTAZAPINE 30 MG: 15 TABLET, FILM COATED ORAL at 22:27

## 2017-06-24 RX ADMIN — BACLOFEN 20 MG: 10 TABLET ORAL at 17:47

## 2017-06-24 RX ADMIN — GABAPENTIN 1200 MG: 400 CAPSULE ORAL at 22:27

## 2017-06-24 RX ADMIN — OXYCODONE HYDROCHLORIDE 30 MG: 15 TABLET ORAL at 03:21

## 2017-06-24 RX ADMIN — Medication 10 ML: at 16:12

## 2017-06-24 RX ADMIN — OXYCODONE HYDROCHLORIDE 30 MG: 15 TABLET ORAL at 09:06

## 2017-06-24 RX ADMIN — Medication 5 ML: at 05:23

## 2017-06-24 RX ADMIN — B-COMPLEX W/ C & FOLIC ACID TAB 1 TABLET: TAB at 09:06

## 2017-06-24 RX ADMIN — DIAZEPAM 10 MG: 5 TABLET ORAL at 03:21

## 2017-06-24 RX ADMIN — SODIUM CHLORIDE 500 ML: 900 INJECTION, SOLUTION INTRAVENOUS at 15:41

## 2017-06-24 RX ADMIN — DIAZEPAM 10 MG: 5 TABLET ORAL at 23:07

## 2017-06-24 RX ADMIN — CYCLOPHOSPHAMIDE 150 MG: 50 CAPSULE ORAL at 22:28

## 2017-06-24 RX ADMIN — DESVENLAFAXINE 100 MG: 100 TABLET, EXTENDED RELEASE ORAL at 09:10

## 2017-06-24 RX ADMIN — OXYCODONE HYDROCHLORIDE 30 MG: 15 TABLET ORAL at 22:26

## 2017-06-24 RX ADMIN — Medication 10 ML: at 22:28

## 2017-06-24 RX ADMIN — DEXTROAMPHETAMINE SACCHARATE, AMPHETAMINE ASPARTATE, DEXTROAMPHETAMINE SULFATE AND AMPHETAMINE SULFATE 30 MG: 3.75; 3.75; 3.75; 3.75 TABLET ORAL at 17:39

## 2017-06-24 NOTE — PROGRESS NOTES
Reviewing notes of patient for spiritual concerns. Patient is Religion.  LOS - 9 days. Lives in [place] and has good family support. Noted anxiety, depression. Per notes patient feeling better. Will continue to assess needs during admission.

## 2017-06-24 NOTE — PROGRESS NOTES
Gave tylenol and benadryl po for plasma apheresis. Pt's BP is 92/56 the doctor has been paged , will await call back. Pt is awake alert and oriented x 4 will continue to monitor.

## 2017-06-24 NOTE — PROGRESS NOTES
Shift assessment complete. Alert and oriented x4. No signs of acute distress. Pt receiving plasma apheresis at this time. Resp even and unlabored. Pt denies pain at this time. Call light within reach. Pt instructed to call for assistance.

## 2017-06-24 NOTE — PROGRESS NOTES
Inpatient Hematology / Oncology Progress Note      Admission Date: 6/15/2017 11:50 AM  Reason for Admission/Hospital Course: Lung mass [R91.8]; Dysphagia, unspecified [R13.10]; Esophagea*    24 Hour Events:  Afebrile. VSS  He is feeling substantially better than on admission. \"A new man\"  For fifth plasma exchange today-tolerating well. ROS:  Constitutional: + fatigue Negative for fever, chills, weakness, malaise. CV: Positive for BLE edema; negative for chest pain, palpitations. Respiratory: Negative for dyspnea, cough, wheezing. GI: Negative for nausea, abdominal pain, diarrhea. 10 point review of systems is otherwise negative with the exception of the elements mentioned above in the HPI. No Known Allergies    OBJECTIVE:  Patient Vitals for the past 8 hrs:   BP Temp Pulse Resp SpO2 Weight   17 1145 - 97.9 °F (36.6 °C) 69 - 94 % -   17 0849 - - - - - 173 lb 4.8 oz (78.6 kg)   17 0749 122/76 98.1 °F (36.7 °C) 86 18 92 % -     Temp (24hrs), Av.7 °F (37.1 °C), Min:97.9 °F (36.6 °C), Max:99.6 °F (37.6 °C)     0701 -  1900  In: 240 [P.O.:240]  Out: -     Physical Exam:  Constitutional: Well developed, well nourished male in no acute distress, sitting  comfortably in the hospital bed. HEENT: Normocephalic and atraumatic. Oropharynx is clear, mucous membranes are moist. Sclerae anicteric. Neck supple    Skin Warm and dry. No bruising and no rash noted. No erythema. No pallor. Respiratory Lungs are clear to auscultation bilaterally without wheezes, rales or rhonchi, normal air exchange without accessory muscle use. CVS Normal rate, regular rhythm and normal S1 and S2. No murmurs, gallops, or rubs. Abdomen RUQ tenderness with palpation-he states is chronic. Soft and nondistended, normoactive bowel sounds. No palpable mass. Neuro Grossly nonfocal with no obvious sensory or motor deficits.    MSK Normal range of motion in general.  No edema and no tenderness. Psych Appropriate mood and affect. Right neck temp apheresis cath with small old blood noted to dressing    Labs:      Recent Labs      06/24/17   0510  06/23/17   0600  06/22/17   0540   WBC  11.7*  9.5  14.2*   RBC  3.85*  4.02*  4.07*   HGB  10.0*  10.4*  10.5*   HCT  31.6*  33.1*  33.0*   MCV  82.1  82.3  81.1   MCH  26.0*  25.9*  25.8*   MCHC  31.6  31.4  31.8   RDW  19.0*  19.2*  18.4*   PLT  284  297  310   GRANS  75  65  78   LYMPH  21  30  19   MONOS  3*  4  2*   EOS  0*  0*  1   BASOS  0  0  0   IG  0.9  0.9  0.5   DF  AUTOMATED  AUTOMATED  AUTOMATED   ANEU  8.8*  6.1  10.9*   ABL  2.5  2.9  2.7   ABM  0.3  0.4  0.3   YEMI  0.0  0.0  0.1   ABB  0.0  0.0  0.0   AIG  0.1  0.1  0.1        Recent Labs      06/24/17   0510  06/23/17   0600  06/22/17   0540   NA  144  148*  146*   K  3.6  3.4*  3.5   CL  106  108*  107   CO2  33*  32  32   AGAP  5*  8  7   GLU  81  98  72   BUN  7  10  13   CREA  0.69*  0.72*  0.72*   GFRAA  >60  >60  >60   GFRNA  >60  >60  >60   CA  8.2*  8.2*  8.1*   SGOT  21  23  20   AP  71  70  73   TP  5.4*  5.2*  5.1*   ALB  2.7*  2.4*  2.3*   GLOB  2.7  2.8  2.8   AGRAT  1.0*  0.9*  0.8*   MG  2.0  2.2  2.0         Imaging:  CT Chest 6/15/17  HISTORY: Lower extremities pain and swelling. Pulmonary infiltrates.     EXAM: Contiguous axial CT images were obtained from the thoracic inlet to the  upper abdomen following the uneventful IV administration of 100 mL Isovue-370. Coronal reformations were performed.     Radiation dose reduction techniques were used for this study. All CT scans  performed at this facility use one or all of the following: Automated exposure  control, adjustment of the mA and/or kVp according to patient's size, and  iterative reconstruction.     PRIOR STUDY: 05/08/2017     FINDINGS: There is no evidence of pulmonary embolus through the subsegmental  level bilaterally. The main pulmonary artery is normal in size.  There are  multiple mildly prominent mediastinal lymph nodes with a representative right  paratracheal lymph node measuring 18 x 10 mm. There are nonenlarged hilar lymph  nodes bilaterally. There is no pericardial effusion.     There has been progressive consolidation and volume loss in the right upper lobe  with areas of air bronchograms as well as multiple cavitary foci. The  consolidative and cavitary process in the right lower lobe has also mildly  enlarged, now measuring 5.6 x 2.5 cm compared with 4.2 x 2.5 cm previously. There is a new 6 mm irregular, nodular density (image 72) located just anterior  and inferior to this larger process. There are minor groundglass opacities in  the left lower lobe, new from the prior exam. There is a background of  centrilobular emphysema. There is no pleural effusion. Several tiny nodular  densities in the right lung base (images 93-96) have developed since the prior  exam.     There are subcentimeter hypodensities in the left hepatic lobe. Several  calcified gallstones are present in the gallbladder. The remaining included  upper abdominal organs are unremarkable. There are no acute or concerning bony  lesions.     IMPRESSION  IMPRESSION:      1. Progressive consolidation and volume loss in the right upper lobe with new  areas of cavitation. The consolidative cavitary process in the right lower lobe  has also mildly enlarged, and several new irregular and groundglass nodules have  developed bilaterally. Findings are suggestive of an infectious process,  possibly fungal.     2. No evidence of pulmonary embolus.     3. Mildly prominent mediastinal lymph nodes which are likely reactive.     4. Cholelithiasis.         ASSESSMENT:    Problem List  Date Reviewed: 6/23/2017          Codes Class Noted    Schatzki's ring ICD-10-CM: K22.2  ICD-9-CM: 750.3  6/23/2017        Bradycardia ICD-10-CM: R00.1  ICD-9-CM: 427.89  6/20/2017        Severe protein-calorie malnutrition (HCC) (Chronic) ICD-10-CM: H22  ICD-9-CM: 160 6/20/2017        Goodpasture syndrome ([facility] [address] ICD-10-CM: M31.0  ICD-9-CM: 446.21  6/17/2017        BPH (benign prostatic hyperplasia) (Chronic) ICD-10-CM: N40.0  ICD-9-CM: 600.00  6/16/2017        Anxiety and depression (Chronic) ICD-10-CM: F41.9, F32.9  ICD-9-CM: 300.00, 311  6/15/2017        History of hepatitis C (Chronic) ICD-10-CM: Z86.19  ICD-9-CM: V12.09  6/15/2017    Overview Signed 8/7/2012 10:55 AM by [facility]     S/p treatment             Chronic shoulder pain (Chronic) ICD-10-CM: M25.519, G89.29  ICD-9-CM: 719.41, 338.29  6/15/2017        HLD (hyperlipidemia) (Chronic) ICD-10-CM: E78.5  ICD-9-CM: 272.4  6/15/2017        * (Principal)Pulmonary cavitary lesion ICD-10-CM: J98.4  ICD-9-CM: 518.89  6/15/2017        Abnormal CXR ICD-10-CM: R93.8  ICD-9-CM: 793.2  6/15/2017        Elevated C-reactive protein (CRP) ICD-10-CM: R79.82  ICD-9-CM: 790.95  6/15/2017                PLAN:  Goodpasture syndrome   6/19: Mr Keen was informed of the apheresis process and purpose and is agreeable  IR was consulted today for temp cath placement. Once line is placed we will start plasmapheresis with the plan of daily for 5 days and then every other day. GBM antibody was 75 on admission. Per Pulmonary, he is scheduled for Bronch/EBUS tomorrow. He will continue on steroids and cytoxan.   6/21 S/p bronch yesterday and found white exudate false cords and diffuse inflammation, edema, erythema. BAL done and pending. Day 2 PLEX today. Plan to repeat levels in 1 week. Continue steroids/cytoxan  6/22 Clinically no changes. Day 3 PLEX today, will continue with steroids and cytoxan.   6/23 Day 4 PLEX today, continue steroids and cytoxan  6/24-Day 5 PLEX today-off tomorrow-resume Monday. Will have repeat quantitation of anti-GBM antibody on Monday. Continue steroids and Cytoxan.      Althea Berumen MD   [facility]  [address] [address]  Office : [phone]  Fax : [phone]

## 2017-06-24 NOTE — PROGRESS NOTES
Shift assessment complete. Pt sitting up in bed with multiple visitors at bedside. Alert and oriented x4. No signs of acute distress. Resp even and unlabored. Call light within reach. PT instructed to call for assistance.

## 2017-06-24 NOTE — PROGRESS NOTES
Irma Steinberg Adcox  Admission Date: 6/15/2017             Daily Progress Note: 6/24/2017    The patient's chart is reviewed and the patient is discussed with the staff. Subjective:     62 yo male presents with chills, shortness of breath, and edema. CT chest 4/21/17 revealed RLL 4.4 cm x 3.1cm density. Had chest CT 5/8/17 with multiple large infiltrative nodules in R lung concerning for cavitation. Was seen 6/8 by Dr. Mehreen Freire and started on Levaquin on 6/9. Was seen by Dr. Mehreen Freire the morning of admission, was not any better,hypoxic on RA, and was referred for admission / ongoing workup of cavitary lesion. History of smoking <1 ppd x 10 years off and on, quit in 2014. CRP elevated at 283. Anti GBM Ab +- initiated on Cyclophosphamide and pulse dose steroid 06/17/17. Nephrology consulted with no plans for renal bx at this time (normal renal function / no blood or microscopic protein in urine). Also seen by heme/Onc with plans for plasma pheresis. Chronic depression, anxiety, ADD,Hepatitis C, BPH, and chronic pain. Has been feeling poorly, lost 40lbs over 3 months secondary to anorexia. Weaned to room air but using O2 prn  Minimal cough and no chest pain.   Getting plasmapheresis    Current Facility-Administered Medications   Medication Dose Route Frequency    0.9% sodium chloride infusion 1,000 mL  1,000 mL IntraVENous PRN    lactated Ringers infusion  100 mL/hr IntraVENous CONTINUOUS    heparin (PF) 2 units/ml in NS infusion 2,000 Units  1,000 mL Irrigation Multiple    sodium chloride (NS) flush 5-10 mL  5-10 mL IntraVENous Q8H    sodium chloride (NS) flush 5-10 mL  5-10 mL IntraVENous PRN    lidocaine (XYLOCAINE) 2 % jelly   Mucous Membrane PRN    acetaminophen (TYLENOL) tablet 650 mg  650 mg Oral Q4H PRN    diphenhydrAMINE (BENADRYL) capsule 25 mg  25 mg Oral Q6H PRN    cyclophosphamide (CYTOXAN) chemo capsule 150 mg  150 mg Oral Q24H    dextroamphetamine-amphetamine (ADDERALL) tablet 30 mg  30 mg Oral TID    baclofen (LIORESAL) tablet 20 mg  20 mg Oral TID    DESVENLAFAXINE SUCCINATE (PRISTIQ PO) - patient supplied  100 mg Oral BID    doxazosin (CARDURA) tablet 4 mg  4 mg Oral DAILY    gabapentin (NEURONTIN) capsule 1,200 mg  1,200 mg Oral TID    lamoTRIgine (LaMICtal) tablet 100 mg  100 mg Oral DAILY    mirtazapine (REMERON) tablet 30 mg  30 mg Oral QHS    multivitamin, stress formula (STRESS TAB) tablet 1 Tab  1 Tab Oral DAILY    pantoprazole (PROTONIX) tablet 40 mg  40 mg Oral ACB    oxyCODONE IR (OXY-IR) immediate release tablet 30 mg  30 mg Oral Q4H PRN    diazePAM (VALIUM) tablet 10 mg  10 mg Oral Q4H PRN    sodium chloride (NS) flush 5-10 mL  5-10 mL IntraVENous Q8H    sodium chloride (NS) flush 5-10 mL  5-10 mL IntraVENous PRN    albuterol (PROVENTIL VENTOLIN) nebulizer solution 2.5 mg  2.5 mg Nebulization Q6H PRN    acetaminophen (TYLENOL) tablet 650 mg  650 mg Oral Q4H PRN    naloxone (NARCAN) injection 0.4 mg  0.4 mg IntraVENous PRN    ondansetron (ZOFRAN) injection 4 mg  4 mg IntraVENous Q4H PRN    senna-docusate (PERICOLACE) 8.6-50 mg per tablet 1 Tab  1 Tab Oral BID PRN       Review of Systems    Constitutional: negative for fever, chills, sweats  Cardiovascular: negative for chest pain, palpitations, syncope, edema  Gastrointestinal:  negative for dysphagia, reflux, vomiting, diarrhea, abdominal pain, or melena  Neurologic:  negative for focal weakness, numbness, headache    Objective:     Vitals:    06/23/17 2000 06/23/17 2320 06/24/17 0308 06/24/17 0749   BP: 114/89 126/70 123/79 122/76   Pulse: 77 74 69 86   Resp: 18 18 18 18   Temp: 98.9 °F (37.2 °C) 98.9 °F (37.2 °C) 98 °F (36.7 °C) 98.1 °F (36.7 °C)   SpO2: 90% 93% 92% 92%   Weight:       Height:         Intake and Output:   06/22 1901 - 06/24 0700  In: 4848 [P.O.:940; I.V.:359]  Out: 3206        Physical Exam:   Constitution:  the patient is well developed and in no acute distress  EENMT:  Sclera clear, pupils equal, oral mucosa moist  Respiratory: clear  Cardiovascular:  RRR without M,G,R  Gastrointestinal: soft and non-tender; with positive bowel sounds. Musculoskeletal: warm without cyanosis. There is no lower leg edema. Skin:  no jaundice or rashes, no wounds   Neurologic: no gross neuro deficits     Psychiatric:  alert and oriented x 3    CXR: some RUL infiltrate      LAB  No results for input(s): GLUCPOC in the last 72 hours. No lab exists for component: [place]   Recent Labs      06/24/17   0510  06/23/17   0600  06/22/17   0540   WBC  11.7*  9.5  14.2*   HGB  10.0*  10.4*  10.5*   HCT  31.6*  33.1*  33.0*   PLT  284  297  310     Recent Labs      06/24/17   0510  06/23/17   0600  06/22/17   0540   NA  144  148*  146*   K  3.6  3.4*  3.5   CL  106  108*  107   CO2  33*  32  32   GLU  81  98  72   BUN  7  10  13   CREA  0.69*  0.72*  0.72*   MG  2.0  2.2  2.0   CA  8.2*  8.2*  8.1*   ALB  2.7*  2.4*  2.3*   TBILI  0.4  0.2  0.3   ALT  23  23  25   SGOT  21  23  20     No results for input(s): PH, PCO2, PO2, HCO3 in the last 72 hours. No results for input(s): LCAD, LAC in the last 72 hours.       Assessment:  (Medical Decision Making)     Hospital Problems  Date Reviewed: 6/23/2017          Codes Class Noted POA    Schatzki's ring ICD-10-CM: K22.2  ICD-9-CM: 750.3  6/23/2017 Yes        Bradycardia ICD-10-CM: R00.1  ICD-9-CM: 427.89  6/20/2017 Unknown    better    Severe protein-calorie malnutrition (HCC) (Chronic) ICD-10-CM: E43  ICD-9-CM: 262  6/20/2017 Yes        Goodpasture syndrome ([address] ICD-10-CM: M31.0  ICD-9-CM: 446.21  6/17/2017 Yes    + serology    BPH (benign prostatic hyperplasia) (Chronic) ICD-10-CM: N40.0  ICD-9-CM: 600.00  6/16/2017 Yes        Anxiety and depression (Chronic) ICD-10-CM: F41.9, F32.9  ICD-9-CM: 300.00, 311  6/15/2017 Yes        History of hepatitis C (Chronic) ICD-10-CM: Z86.19  ICD-9-CM: V12.09  6/15/2017 Yes    Overview Signed 8/7/2012 10:55 AM by Keyla Freitas III     S/p treatment             Chronic shoulder pain (Chronic) ICD-10-CM: M25.519, G89.29  ICD-9-CM: 719.41, 338.29  6/15/2017 Yes        HLD (hyperlipidemia) (Chronic) ICD-10-CM: E78.5  ICD-9-CM: 272.4  6/15/2017 Yes        * (Principal)Pulmonary cavitary lesion ICD-10-CM: J98.4  ICD-9-CM: 518.89  6/15/2017 Yes    S/p bronch-so far nothing specific- no ca    Abnormal CXR ICD-10-CM: R93.8  ICD-9-CM: 793.2  6/15/2017 Yes        Elevated C-reactive protein (CRP) ICD-10-CM: R79.82  ICD-9-CM: 790.95  6/15/2017 Yes              Plan:  (Medical Decision Making)   1   Continue plasmapheresis-could do as outpt. But no transportation  2   Follow up gold quanteferon,cultures,   3   May need repeat ct of chest at some point  --    More than 50% of the time documented was spent in face-to-face contact with the patient and in the care of the patient on the floor/unit where the patient is located.     Johnny pApiah MD

## 2017-06-24 NOTE — PROGRESS NOTES
Procedure: Therapeutic plasma exchange  Dx: [place] syndrome  Day: 5 of 5 ( daily, then every other day)  Labs drawn: CBC, CMP,PTT, FIBRINOGEN  Calcium used:   4 grams  Replacement product: FFP  Replace volume:3682  Remove volume: 3890   TBV processed: 100%  TPV 1.2  [facility] # (if FFP used): [id number]  Issues: before initiating, pt with SBP 90's order received from [facility] NP to give 500 cc bolus, repeat SBP 80's order received to repeat bolus. BP responded well to 2nd bolus. TPE was then begun.   Next procedure date: 6/26/17  Labs needed: per protocol    Report given to Dante Sommer, primary RN

## 2017-06-24 NOTE — PROGRESS NOTES
Pt resting quietly in bed. Resp even and unlabored. Call light within reach. Report to be given to oncoming RN.

## 2017-06-25 PROBLEM — R60.9 EDEMA: Status: ACTIVE | Noted: 2017-06-25

## 2017-06-25 LAB
BLD PROD TYP BPU: NORMAL
BPU ID: NORMAL
STATUS OF UNIT,%ST: NORMAL
UNIT DIVISION, %UDIV: 0
UNIT DIVISION, %UDIV: NORMAL

## 2017-06-25 PROCEDURE — 99232 SBSQ HOSP IP/OBS MODERATE 35: CPT | Performed by: INTERNAL MEDICINE

## 2017-06-25 PROCEDURE — 65270000029 HC RM PRIVATE

## 2017-06-25 PROCEDURE — 74011250636 HC RX REV CODE- 250/636: Performed by: INTERNAL MEDICINE

## 2017-06-25 PROCEDURE — 94762 N-INVAS EAR/PLS OXIMTRY CONT: CPT

## 2017-06-25 PROCEDURE — 74011250637 HC RX REV CODE- 250/637: Performed by: PHYSICIAN ASSISTANT

## 2017-06-25 PROCEDURE — 94760 N-INVAS EAR/PLS OXIMETRY 1: CPT

## 2017-06-25 RX ORDER — FUROSEMIDE 10 MG/ML
40 INJECTION INTRAMUSCULAR; INTRAVENOUS 2 TIMES DAILY
Status: DISCONTINUED | OUTPATIENT
Start: 2017-06-25 | End: 2017-06-29 | Stop reason: HOSPADM

## 2017-06-25 RX ADMIN — DESVENLAFAXINE 100 MG: 100 TABLET, EXTENDED RELEASE ORAL at 17:26

## 2017-06-25 RX ADMIN — BACLOFEN 20 MG: 10 TABLET ORAL at 16:12

## 2017-06-25 RX ADMIN — CYCLOPHOSPHAMIDE 150 MG: 50 CAPSULE ORAL at 21:29

## 2017-06-25 RX ADMIN — GABAPENTIN 1200 MG: 400 CAPSULE ORAL at 16:13

## 2017-06-25 RX ADMIN — Medication 5 ML: at 21:29

## 2017-06-25 RX ADMIN — DIAZEPAM 10 MG: 5 TABLET ORAL at 21:28

## 2017-06-25 RX ADMIN — Medication 10 ML: at 16:16

## 2017-06-25 RX ADMIN — OXYCODONE HYDROCHLORIDE 30 MG: 15 TABLET ORAL at 16:15

## 2017-06-25 RX ADMIN — GABAPENTIN 1200 MG: 400 CAPSULE ORAL at 21:28

## 2017-06-25 RX ADMIN — MIRTAZAPINE 30 MG: 15 TABLET, FILM COATED ORAL at 21:29

## 2017-06-25 RX ADMIN — DIAZEPAM 10 MG: 5 TABLET ORAL at 05:21

## 2017-06-25 RX ADMIN — DOXAZOSIN 4 MG: 4 TABLET ORAL at 09:26

## 2017-06-25 RX ADMIN — FUROSEMIDE 40 MG: 10 INJECTION, SOLUTION INTRAMUSCULAR; INTRAVENOUS at 13:18

## 2017-06-25 RX ADMIN — OXYCODONE HYDROCHLORIDE 30 MG: 15 TABLET ORAL at 05:22

## 2017-06-25 RX ADMIN — PANTOPRAZOLE SODIUM 40 MG: 40 TABLET, DELAYED RELEASE ORAL at 05:22

## 2017-06-25 RX ADMIN — BACLOFEN 20 MG: 10 TABLET ORAL at 09:26

## 2017-06-25 RX ADMIN — OXYCODONE HYDROCHLORIDE 30 MG: 15 TABLET ORAL at 01:00

## 2017-06-25 RX ADMIN — B-COMPLEX W/ C & FOLIC ACID TAB 1 TABLET: TAB at 09:26

## 2017-06-25 RX ADMIN — DEXTROAMPHETAMINE SACCHARATE, AMPHETAMINE ASPARTATE, DEXTROAMPHETAMINE SULFATE AND AMPHETAMINE SULFATE 30 MG: 3.75; 3.75; 3.75; 3.75 TABLET ORAL at 09:26

## 2017-06-25 RX ADMIN — BACLOFEN 20 MG: 10 TABLET ORAL at 21:28

## 2017-06-25 RX ADMIN — DEXTROAMPHETAMINE SACCHARATE, AMPHETAMINE ASPARTATE, DEXTROAMPHETAMINE SULFATE AND AMPHETAMINE SULFATE 30 MG: 3.75; 3.75; 3.75; 3.75 TABLET ORAL at 16:13

## 2017-06-25 RX ADMIN — LAMOTRIGINE 100 MG: 100 TABLET ORAL at 09:26

## 2017-06-25 RX ADMIN — Medication 10 ML: at 05:22

## 2017-06-25 RX ADMIN — DESVENLAFAXINE 100 MG: 100 TABLET, EXTENDED RELEASE ORAL at 09:28

## 2017-06-25 RX ADMIN — OXYCODONE HYDROCHLORIDE 30 MG: 15 TABLET ORAL at 21:29

## 2017-06-25 RX ADMIN — DIAZEPAM 10 MG: 5 TABLET ORAL at 17:26

## 2017-06-25 RX ADMIN — GABAPENTIN 1200 MG: 400 CAPSULE ORAL at 09:26

## 2017-06-25 NOTE — PROGRESS NOTES
Bonnie Dawson Adcox  Admission Date: 6/15/2017             Daily Progress Note: 6/25/2017    The patient's chart is reviewed and the patient is discussed with the staff. Subjective:     62 yo male presents with chills, shortness of breath, and edema. CT chest 4/21/17 revealed RLL 4.4 cm x 3.1cm density. Had chest CT 5/8/17 with multiple large infiltrative nodules in R lung concerning for cavitation. Was seen 6/8 by Dr. Narciso Hernandez and started on Levaquin on 6/9. Was seen by Dr. Narciso Hernandez the morning of admission, was not any better,hypoxic on RA, and was referred for admission / ongoing workup of cavitary lesion. History of smoking <1 ppd x 10 years off and on, quit in 2014. CRP elevated at 283. Anti GBM Ab +- initiated on Cyclophosphamide and pulse dose steroid 06/17/17. Nephrology consulted with no plans for renal bx at this time (normal renal function / no blood or microscopic protein in urine). Also seen by heme/Onc with plans for plasma pheresis.  Chronic depression, anxiety, ADD,Hepatitis C, BPH, and chronic pain. Has been feeling poorly, lost 40lbs over 3 months secondary to anorexia.  Weaned to room air but using O2 prn    Currently back on O2 2 L nc.   Has noted increased edema in legs    Current Facility-Administered Medications   Medication Dose Route Frequency    furosemide (LASIX) injection 40 mg  40 mg IntraVENous BID    0.9% sodium chloride infusion 250 mL  250 mL IntraVENous PRN    0.9% sodium chloride infusion 1,000 mL  1,000 mL IntraVENous PRN    heparin (PF) 2 units/ml in NS infusion 2,000 Units  1,000 mL Irrigation Multiple    sodium chloride (NS) flush 5-10 mL  5-10 mL IntraVENous Q8H    sodium chloride (NS) flush 5-10 mL  5-10 mL IntraVENous PRN    lidocaine (XYLOCAINE) 2 % jelly   Mucous Membrane PRN    acetaminophen (TYLENOL) tablet 650 mg  650 mg Oral Q4H PRN    diphenhydrAMINE (BENADRYL) capsule 25 mg  25 mg Oral Q6H PRN    cyclophosphamide (CYTOXAN) chemo capsule 150 mg 150 mg Oral Q24H    dextroamphetamine-amphetamine (ADDERALL) tablet 30 mg  30 mg Oral TID    baclofen (LIORESAL) tablet 20 mg  20 mg Oral TID    DESVENLAFAXINE SUCCINATE (PRISTIQ PO) - patient supplied  100 mg Oral BID    doxazosin (CARDURA) tablet 4 mg  4 mg Oral DAILY    gabapentin (NEURONTIN) capsule 1,200 mg  1,200 mg Oral TID    lamoTRIgine (LaMICtal) tablet 100 mg  100 mg Oral DAILY    mirtazapine (REMERON) tablet 30 mg  30 mg Oral QHS    multivitamin, stress formula (STRESS TAB) tablet 1 Tab  1 Tab Oral DAILY    pantoprazole (PROTONIX) tablet 40 mg  40 mg Oral ACB    oxyCODONE IR (OXY-IR) immediate release tablet 30 mg  30 mg Oral Q4H PRN    diazePAM (VALIUM) tablet 10 mg  10 mg Oral Q4H PRN    sodium chloride (NS) flush 5-10 mL  5-10 mL IntraVENous Q8H    sodium chloride (NS) flush 5-10 mL  5-10 mL IntraVENous PRN    albuterol (PROVENTIL VENTOLIN) nebulizer solution 2.5 mg  2.5 mg Nebulization Q6H PRN    acetaminophen (TYLENOL) tablet 650 mg  650 mg Oral Q4H PRN    naloxone (NARCAN) injection 0.4 mg  0.4 mg IntraVENous PRN    ondansetron (ZOFRAN) injection 4 mg  4 mg IntraVENous Q4H PRN    senna-docusate (PERICOLACE) 8.6-50 mg per tablet 1 Tab  1 Tab Oral BID PRN       Review of Systems    Constitutional: negative for fever, chills, sweats  Cardiovascular: negative for chest pain, palpitations, syncope,   + for edema  Gastrointestinal:  negative for dysphagia, reflux, vomiting, diarrhea, abdominal pain, or melena  Neurologic:  negative for focal weakness, numbness, headache    Objective:     Vitals:    06/25/17 0001 06/25/17 0300 06/25/17 0524 06/25/17 0704   BP: 129/66 105/71  121/81   Pulse: 71 69  63   Resp: 18 18  18   Temp: 98.4 °F (36.9 °C) 98.3 °F (36.8 °C)  98.4 °F (36.9 °C)   SpO2: 91% 97%  95%   Weight:   180 lb 14.4 oz (82.1 kg)    Height:         Intake and Output:   06/23 1901 - 06/25 0700  In: 5764 [P.O.:1080; I.V.:410]  Out: 3890   06/25 0701 - 06/25 1900  In: 240 [P.O.:240]  Out: -     Physical Exam:   Constitution:  the patient is well developed and in no acute distress  EENMT:  Sclera clear, pupils equal, oral mucosa moist  Respiratory:   Fairly clear  Cardiovascular:  RRR without M,G,R  Gastrointestinal: soft and non-tender; with positive bowel sounds. Musculoskeletal: warm without cyanosis. There is 2+ lower leg edema. Skin:  no jaundice or rashes, no wounds   Neurologic: no gross neuro deficits     Psychiatric:  alert and oriented x 3    CXR:       LAB  No results for input(s): GLUCPOC in the last 72 hours. No lab exists for component: GLPOC   Recent Labs      06/24/17   0510  06/23/17   0600   WBC  11.7*  9.5   HGB  10.0*  10.4*   HCT  31.6*  33.1*   PLT  284  297     Recent Labs      06/24/17   0510  06/23/17   0600   NA  144  148*   K  3.6  3.4*   CL  106  108*   CO2  33*  32   GLU  81  98   BUN  7  10   CREA  0.69*  0.72*   MG  2.0  2.2   CA  8.2*  8.2*   ALB  2.7*  2.4*   TBILI  0.4  0.2   ALT  23  23   SGOT  21  23     No results for input(s): PH, PCO2, PO2, HCO3 in the last 72 hours. No results for input(s): LCAD, LAC in the last 72 hours.       Assessment:  (Medical Decision Making)     Hospital Problems  Date Reviewed: 6/23/2017          Codes Class Noted POA    Edema ICD-10-CM: R60.9  ICD-9-CM: 782.3  6/25/2017 Unknown    Lower ext new    Schatzki's ring ICD-10-CM: K22.2  ICD-9-CM: 750.3  6/23/2017 Yes        Bradycardia ICD-10-CM: R00.1  ICD-9-CM: 427.89  6/20/2017 Unknown    better    Severe protein-calorie malnutrition (HCC) (Chronic) ICD-10-CM: E43  ICD-9-CM: 262  6/20/2017 Yes        Goodpasture syndrome ([address] ICD-10-CM: M31.0  ICD-9-CM: 446.21  6/17/2017 Yes    plasmapheresis    BPH (benign prostatic hyperplasia) (Chronic) ICD-10-CM: N40.0  ICD-9-CM: 600.00  6/16/2017 Yes        Anxiety and depression (Chronic) ICD-10-CM: F41.9, F32.9  ICD-9-CM: 300.00, 311  6/15/2017 Yes        History of hepatitis C (Chronic) ICD-10-CM: Z86.19  ICD-9-CM: V12.09 6/15/2017 Yes    Overview Signed 8/7/2012 10:55 AM by Rochelle Mondragon III     S/p treatment             Chronic shoulder pain (Chronic) ICD-10-CM: M25.519, G89.29  ICD-9-CM: 719.41, 338.29  6/15/2017 Yes        HLD (hyperlipidemia) (Chronic) ICD-10-CM: E78.5  ICD-9-CM: 272.4  6/15/2017 Yes        * (Principal)Pulmonary cavitary lesion ICD-10-CM: J98.4  ICD-9-CM: 518.89  6/15/2017 Yes        Abnormal CXR ICD-10-CM: R93.8  ICD-9-CM: 793.2  6/15/2017 Yes        Elevated C-reactive protein (CRP) ICD-10-CM: R79.82  ICD-9-CM: 790.95  6/15/2017 Yes              Plan:  (Medical Decision Making)   1   Iv lasix  2   matilda   3   Wean O2  4   plasmapheresis  --    More than 50% of the time documented was spent in face-to-face contact with the patient and in the care of the patient on the floor/unit where the patient is located.     Danne Epley, MD

## 2017-06-26 PROBLEM — R00.1 BRADYCARDIA: Status: RESOLVED | Noted: 2017-06-20 | Resolved: 2017-06-26

## 2017-06-26 LAB
A FUMIGATUS DNA SPEC QL NAA+PROBE: NOT DETECTED
A TERREUS DNA SPEC QL NAA+PROBE: NOT DETECTED
ALBUMIN SERPL BCP-MCNC: 2.9 G/DL (ref 3.5–5)
ALBUMIN/GLOB SERPL: 0.9 {RATIO} (ref 1.2–3.5)
ALP SERPL-CCNC: 74 U/L (ref 50–136)
ALT SERPL-CCNC: 26 U/L (ref 12–65)
ANION GAP BLD CALC-SCNC: 7 MMOL/L (ref 7–16)
ASPERGILLUS DNA SPEC QL NAA+PROBE: NOT DETECTED
AST SERPL W P-5'-P-CCNC: 24 U/L (ref 15–37)
BASOPHILS # BLD AUTO: 0 K/UL (ref 0–0.2)
BASOPHILS # BLD: 0 % (ref 0–2)
BILIRUB SERPL-MCNC: 0.3 MG/DL (ref 0.2–1.1)
BUN SERPL-MCNC: 8 MG/DL (ref 6–23)
C. PNEUMONAIE, CPPT: NOT DETECTED
CALCIUM SERPL-MCNC: 8.7 MG/DL (ref 8.3–10.4)
CHLORIDE SERPL-SCNC: 103 MMOL/L (ref 98–107)
CO2 SERPL-SCNC: 35 MMOL/L (ref 21–32)
CREAT SERPL-MCNC: 1.07 MG/DL (ref 0.8–1.5)
DIFFERENTIAL METHOD BLD: ABNORMAL
EOSINOPHIL # BLD: 0 K/UL (ref 0–0.8)
EOSINOPHIL NFR BLD: 0 % (ref 0.5–7.8)
ERYTHROCYTE [DISTWIDTH] IN BLOOD BY AUTOMATED COUNT: 20.2 % (ref 11.9–14.6)
GALACTOMANNAN AG SERPL IA-ACNC: 0.07
GLOBULIN SER CALC-MCNC: 3.2 G/DL (ref 2.3–3.5)
GLUCOSE SERPL-MCNC: 113 MG/DL (ref 65–100)
HADV DNA # BAL NAA+PROBE: NOT DETECTED COPIES/ML
HCT VFR BLD AUTO: 32 % (ref 41.1–50.3)
HCV GENOTYPE: NORMAL
HCV RNA SERPL NAA+PROBE-ACNC: NORMAL IU/ML
HCV RNA SERPL NAA+PROBE-LOG IU: NORMAL LOG10 IU/ML
HGB BLD-MCNC: 10.1 G/DL (ref 13.6–17.2)
HSV1 DNA # BAL NAA+PROBE: NOT DETECTED COPIES/ML
HSV2 DNA # BAL NAA+PROBE: NOT DETECTED COPIES/ML
IMM GRANULOCYTES # BLD: 0.1 K/UL (ref 0–0.5)
IMM GRANULOCYTES NFR BLD AUTO: 0.5 % (ref 0–5)
L PNEUMO DNA SPEC QL NAA+PROBE: NOT DETECTED
LYMPHOCYTES # BLD AUTO: 24 % (ref 13–44)
LYMPHOCYTES # BLD: 2.4 K/UL (ref 0.5–4.6)
M. PNEUMONIAE, MPPT: NOT DETECTED
MCH RBC QN AUTO: 26.1 PG (ref 26.1–32.9)
MCHC RBC AUTO-ENTMCNC: 31.6 G/DL (ref 31.4–35)
MCV RBC AUTO: 82.7 FL (ref 79.6–97.8)
MONOCYTES # BLD: 0.4 K/UL (ref 0.1–1.3)
MONOCYTES NFR BLD AUTO: 4 % (ref 4–12)
NEUTS SEG # BLD: 7.2 K/UL (ref 1.7–8.2)
NEUTS SEG NFR BLD AUTO: 72 % (ref 43–78)
PLATELET # BLD AUTO: 234 K/UL (ref 150–450)
PMV BLD AUTO: 8.9 FL (ref 10.8–14.1)
POTASSIUM SERPL-SCNC: 3.3 MMOL/L (ref 3.5–5.1)
PROT SERPL-MCNC: 6.1 G/DL (ref 6.3–8.2)
RBC # BLD AUTO: 3.87 M/UL (ref 4.23–5.67)
SODIUM SERPL-SCNC: 145 MMOL/L (ref 136–145)
TEST INFORMATION, 550045: NORMAL
WBC # BLD AUTO: 10 K/UL (ref 4.3–11.1)

## 2017-06-26 PROCEDURE — 85025 COMPLETE CBC W/AUTO DIFF WBC: CPT | Performed by: INTERNAL MEDICINE

## 2017-06-26 PROCEDURE — 74011250636 HC RX REV CODE- 250/636: Performed by: INTERNAL MEDICINE

## 2017-06-26 PROCEDURE — 36514 APHERESIS PLASMA: CPT

## 2017-06-26 PROCEDURE — 36430 TRANSFUSION BLD/BLD COMPNT: CPT

## 2017-06-26 PROCEDURE — 74011250637 HC RX REV CODE- 250/637: Performed by: PHYSICIAN ASSISTANT

## 2017-06-26 PROCEDURE — 65270000029 HC RM PRIVATE

## 2017-06-26 PROCEDURE — P9059 PLASMA, FRZ BETWEEN 8-24HOUR: HCPCS | Performed by: INTERNAL MEDICINE

## 2017-06-26 PROCEDURE — 99232 SBSQ HOSP IP/OBS MODERATE 35: CPT | Performed by: INTERNAL MEDICINE

## 2017-06-26 PROCEDURE — 80053 COMPREHEN METABOLIC PANEL: CPT | Performed by: INTERNAL MEDICINE

## 2017-06-26 RX ADMIN — LAMOTRIGINE 100 MG: 100 TABLET ORAL at 08:35

## 2017-06-26 RX ADMIN — GABAPENTIN 1200 MG: 400 CAPSULE ORAL at 23:47

## 2017-06-26 RX ADMIN — GABAPENTIN 1200 MG: 400 CAPSULE ORAL at 17:02

## 2017-06-26 RX ADMIN — Medication 5 ML: at 05:25

## 2017-06-26 RX ADMIN — FUROSEMIDE 40 MG: 10 INJECTION, SOLUTION INTRAMUSCULAR; INTRAVENOUS at 00:50

## 2017-06-26 RX ADMIN — MIRTAZAPINE 30 MG: 15 TABLET, FILM COATED ORAL at 23:47

## 2017-06-26 RX ADMIN — PANTOPRAZOLE SODIUM 40 MG: 40 TABLET, DELAYED RELEASE ORAL at 05:25

## 2017-06-26 RX ADMIN — DIAZEPAM 10 MG: 5 TABLET ORAL at 17:02

## 2017-06-26 RX ADMIN — DEXTROAMPHETAMINE SACCHARATE, AMPHETAMINE ASPARTATE, DEXTROAMPHETAMINE SULFATE AND AMPHETAMINE SULFATE 30 MG: 3.75; 3.75; 3.75; 3.75 TABLET ORAL at 17:02

## 2017-06-26 RX ADMIN — Medication 5 ML: at 17:04

## 2017-06-26 RX ADMIN — DEXTROAMPHETAMINE SACCHARATE, AMPHETAMINE ASPARTATE, DEXTROAMPHETAMINE SULFATE AND AMPHETAMINE SULFATE 30 MG: 3.75; 3.75; 3.75; 3.75 TABLET ORAL at 08:34

## 2017-06-26 RX ADMIN — BACLOFEN 20 MG: 10 TABLET ORAL at 23:47

## 2017-06-26 RX ADMIN — OXYCODONE HYDROCHLORIDE 30 MG: 15 TABLET ORAL at 19:50

## 2017-06-26 RX ADMIN — OXYCODONE HYDROCHLORIDE 30 MG: 15 TABLET ORAL at 05:24

## 2017-06-26 RX ADMIN — FUROSEMIDE 40 MG: 10 INJECTION, SOLUTION INTRAMUSCULAR; INTRAVENOUS at 23:49

## 2017-06-26 RX ADMIN — Medication 5 ML: at 23:50

## 2017-06-26 RX ADMIN — FUROSEMIDE 40 MG: 10 INJECTION, SOLUTION INTRAMUSCULAR; INTRAVENOUS at 08:35

## 2017-06-26 RX ADMIN — BACLOFEN 20 MG: 10 TABLET ORAL at 08:34

## 2017-06-26 RX ADMIN — CALCIUM GLUCONATE: 94 INJECTION, SOLUTION INTRAVENOUS at 15:41

## 2017-06-26 RX ADMIN — CYCLOPHOSPHAMIDE 150 MG: 50 CAPSULE ORAL at 23:45

## 2017-06-26 RX ADMIN — GABAPENTIN 1200 MG: 400 CAPSULE ORAL at 08:34

## 2017-06-26 RX ADMIN — OXYCODONE HYDROCHLORIDE 30 MG: 15 TABLET ORAL at 13:22

## 2017-06-26 RX ADMIN — DESVENLAFAXINE 100 MG: 100 TABLET, EXTENDED RELEASE ORAL at 17:02

## 2017-06-26 RX ADMIN — DIAZEPAM 10 MG: 5 TABLET ORAL at 05:24

## 2017-06-26 RX ADMIN — BACLOFEN 20 MG: 10 TABLET ORAL at 17:02

## 2017-06-26 RX ADMIN — B-COMPLEX W/ C & FOLIC ACID TAB 1 TABLET: TAB at 08:35

## 2017-06-26 RX ADMIN — DOXAZOSIN 4 MG: 4 TABLET ORAL at 08:35

## 2017-06-26 RX ADMIN — Medication 10 ML: at 05:25

## 2017-06-26 RX ADMIN — DESVENLAFAXINE 100 MG: 100 TABLET, EXTENDED RELEASE ORAL at 08:34

## 2017-06-26 NOTE — PROGRESS NOTES
Met with patient to discuss discharge planning. Patient lives alone in his own home where he is normally independent at baseline. Explained need for transportation to and from [facility] for pheresis procedures every other day x 1 week. Patient does not seem to understand that this is for just 1 week although I repeated that as the documented length of time we will need to secure transportation. Patient states that he has friends and relatives, but when asked if one of them can provide a ride, he refuses to commit. Patient repeatedly asks what is going to be done about the nodules in his lungs. He also voices concern over the central line in his neck and whether he is going home with that. Patient states that he was seen by [facility] doctors\" this morning, but does not understand what the plan for his care will be. He asks when a biopsy will be run. Patient is requesting that the MD see him again and explain what will be done and why. He voices understanding that it may be tomorrow before a physician comes again. Patient states that he is not comfortable that he understands what is going to happen. There is no transportation that patient will qualify for other than private pay ambulance or taxi. It does not seem that patient is willing to commit to this solution. Case Management will continue to follow for discharge planning.      Care Management Interventions  Transition of Care Consult (CM Consult): Discharge Planning  Discharge Durable Medical Equipment: No  Physical Therapy Consult: No  Occupational Therapy Consult: No  Speech Therapy Consult: Yes  Current Support Network: Lives Alone, Own Home  Confirm Follow Up Transport: Other (see comment) (To be determined.)  Plan discussed with Pt/Family/Caregiver: Yes  Freedom of Choice Offered: Yes  Discharge Location  Discharge Placement: Home with home health

## 2017-06-26 NOTE — PROGRESS NOTES
Shift assessment complete. Pt resting quietly in bed. No signs of acute distress. Wife at bedside. Resp even and unlabored. Call light within reach.

## 2017-06-26 NOTE — PROGRESS NOTES
Found pt asleep with pulse ox machine alarming and sats 83-85%. Woke pt up and encouraged pt to take deep breaths although sats failed to come up. Placed pt on 2lnc and o2 sat came up to 94% on 2L.

## 2017-06-26 NOTE — PROGRESS NOTES
Overnight sat study started on monitor #250. Pt is on room air. Date and time are correct.  Previous data deleted

## 2017-06-26 NOTE — PROGRESS NOTES
Pt reporting generalized 9/10 pain, requesting Oxycodone. Pt given Roxicodone 30mg PO per pt request, will monitor.

## 2017-06-26 NOTE — PROGRESS NOTES
Hawa Levi Adcox  Admission Date: 6/15/2017             Daily Progress Note: 6/26/2017    The patient's chart is reviewed and the patient is discussed with the staff. 62 yo male presents with chills, shortness of breath, and edema. CT chest 4/21/17 revealed RLL 4.4 cm x 3.1cm density. Had chest CT 5/8/17 with multiple large infiltrative nodules in R lung concerning for cavitation. Was seen 6/8 by Dr. Fernando Pacheco and started on Levaquin on 6/9. Was not any better, was hypoxic on RA, and was referred for admission / ongoing workup of cavitary lesion. History of smoking <1 ppd x 10 years off and on, quit in 2014. CRP elevated at 283. Anti GBM Ab +- initiated on Cyclophosphamide and pulse dose steroid 06/17/17. Nephrology consulted with no plans for renal bx (normal renal function / no blood or microscopic protein in urine). Also seen by heme/Onc with plans for plasma pheresis.  Chronic depression, anxiety, ADD,Hepatitis C, BPH, and chronic pain. Has been feeling poorly, lost 40lbs over 3 months secondary to anorexia.  Weaned to room air but using O2 prn. Undergoing plasmapheresis per hematology. AN with desat 1hr--NC 1L applied. Subjective:       Currently back on O2 2 L NC--sat dropped on AN. Still with lower leg edema--voiding on Lasix. Denies shortness of breath, has rare , nonproductive cough.     Current Facility-Administered Medications   Medication Dose Route Frequency    furosemide (LASIX) injection 40 mg  40 mg IntraVENous BID    0.9% sodium chloride infusion 250 mL  250 mL IntraVENous PRN    0.9% sodium chloride infusion 1,000 mL  1,000 mL IntraVENous PRN    heparin (PF) 2 units/ml in NS infusion 2,000 Units  1,000 mL Irrigation Multiple    sodium chloride (NS) flush 5-10 mL  5-10 mL IntraVENous Q8H    sodium chloride (NS) flush 5-10 mL  5-10 mL IntraVENous PRN    lidocaine (XYLOCAINE) 2 % jelly   Mucous Membrane PRN    acetaminophen (TYLENOL) tablet 650 mg  650 mg Oral Q4H PRN    diphenhydrAMINE (BENADRYL) capsule 25 mg  25 mg Oral Q6H PRN    cyclophosphamide (CYTOXAN) chemo capsule 150 mg  150 mg Oral Q24H    dextroamphetamine-amphetamine (ADDERALL) tablet 30 mg  30 mg Oral TID    baclofen (LIORESAL) tablet 20 mg  20 mg Oral TID    DESVENLAFAXINE SUCCINATE (PRISTIQ PO) - patient supplied  100 mg Oral BID    doxazosin (CARDURA) tablet 4 mg  4 mg Oral DAILY    gabapentin (NEURONTIN) capsule 1,200 mg  1,200 mg Oral TID    lamoTRIgine (LaMICtal) tablet 100 mg  100 mg Oral DAILY    mirtazapine (REMERON) tablet 30 mg  30 mg Oral QHS    multivitamin, stress formula (STRESS TAB) tablet 1 Tab  1 Tab Oral DAILY    pantoprazole (PROTONIX) tablet 40 mg  40 mg Oral ACB    oxyCODONE IR (OXY-IR) immediate release tablet 30 mg  30 mg Oral Q4H PRN    diazePAM (VALIUM) tablet 10 mg  10 mg Oral Q4H PRN    sodium chloride (NS) flush 5-10 mL  5-10 mL IntraVENous Q8H    sodium chloride (NS) flush 5-10 mL  5-10 mL IntraVENous PRN    albuterol (PROVENTIL VENTOLIN) nebulizer solution 2.5 mg  2.5 mg Nebulization Q6H PRN    acetaminophen (TYLENOL) tablet 650 mg  650 mg Oral Q4H PRN    naloxone (NARCAN) injection 0.4 mg  0.4 mg IntraVENous PRN    ondansetron (ZOFRAN) injection 4 mg  4 mg IntraVENous Q4H PRN    senna-docusate (PERICOLACE) 8.6-50 mg per tablet 1 Tab  1 Tab Oral BID PRN       Review of Systems  Constitutional: negative for fever, chills, sweats  Cardiovascular: LE edema, negative for chest pain, palpitations, syncope  Gastrointestinal:  negative for dysphagia, reflux, vomiting, diarrhea, abdominal pain, or melena  Neurologic:  negative for focal weakness, numbness, headache    Objective:     Vitals:    06/25/17 2042 06/25/17 2336 06/26/17 0302 06/26/17 0430   BP: 95/66 109/69 101/69    Pulse: 71 74 70    Resp: 18 18 18    Temp: 99.1 °F (37.3 °C) 98.9 °F (37.2 °C) 98.6 °F (37 °C)    SpO2: 90% 94% 90%    Weight:    165 lb (74.8 kg)   Height:         Intake and Output:   06/24 1901 - 06/26 0700  In: 1157 [P.O.:840]  Out: -        Physical Exam:   Constitution:  the patient is well developed and in no acute distress, NC 2L  EENMT:  Sclera clear, pupils equal, oral mucosa moist  Respiratory:   Clear anterior and posterior, no wheezing  Cardiovascular:  RRR without M,G,R  Gastrointestinal: soft and non-tender; with positive bowel sounds. Musculoskeletal: warm without cyanosis. There is 1+ lower leg edema. Skin:  no jaundice or rashes, no wounds   Neurologic: no gross neuro deficits     Psychiatric:  alert and oriented x 3    CXR: None today      LAB  No results for input(s): GLUCPOC in the last 72 hours. No lab exists for component: GLPOC   Recent Labs      06/24/17   0510   WBC  11.7*   HGB  10.0*   HCT  31.6*   PLT  284     Recent Labs      06/24/17   0510   NA  144   K  3.6   CL  106   CO2  33*   GLU  81   BUN  7   CREA  0.69*   MG  2.0   CA  8.2*   ALB  2.7*   TBILI  0.4   ALT  23   SGOT  21     No results for input(s): PH, PCO2, PO2, HCO3 in the last 72 hours. No results for input(s): LCAD, LAC in the last 72 hours.       Assessment:  (Medical Decision Making)     Hospital Problems  Date Reviewed: 6/26/2017          Codes Class Noted POA    Edema ICD-10-CM: R60.9  ICD-9-CM: 782.3  6/25/2017 Unknown    diuresing    Schatzki's ring ICD-10-CM: K22.2  ICD-9-CM: 750.3  6/23/2017 Yes    unchanged    Bradycardia ICD-10-CM: R00.1  ICD-9-CM: 427.89  6/20/2017 Unknown    resolved    Severe protein-calorie malnutrition ([facility] [address] (Chronic) ICD-10-CM: E43  ICD-9-CM: 262  6/20/2017 Yes    chronic    Goodpasture syndrome ([facility] [address] ICD-10-CM: M31.0  ICD-9-CM: 446.21  6/17/2017 Yes    Continue current    BPH (benign prostatic hyperplasia) (Chronic) ICD-10-CM: N40.0  ICD-9-CM: 600.00  6/16/2017 Yes    Chronic--voiding without problems    Anxiety and depression (Chronic) ICD-10-CM: F41.9, F32.9  ICD-9-CM: 300.00, 311  6/15/2017 Yes    chronic    History of hepatitis C (Chronic) ICD-10-CM: Z86.19  ICD-9-CM: V12.09  6/15/2017 Yes     S/p treatment         chronic    Chronic shoulder pain (Chronic) ICD-10-CM: M25.519, G89.29  ICD-9-CM: 719.41, 338.29  6/15/2017 Yes    chronic    HLD (hyperlipidemia) (Chronic) ICD-10-CM: E78.5  ICD-9-CM: 272.4  6/15/2017 Yes    chronic    * (Principal)Pulmonary cavitary lesion ICD-10-CM: J98.4  ICD-9-CM: 518.89  6/15/2017 Yes    continue current    Abnormal CXR ICD-10-CM: R93.8  ICD-9-CM: 793.2  6/15/2017 Yes    unchanged    Elevated C-reactive protein (CRP) ICD-10-CM: R79.82  ICD-9-CM: 790.95  6/15/2017 Yes    On admission 283          Plan:  (Medical Decision Making)     --IV  Lasix  --AN on room air with desat 1hr and 1 min--placed on NC 1L  --Hematology following for plasmapheresis, on Cytoxin    More than 50% of the time documented was spent in face-to-face contact with the patient and in the care of the patient on the floor/unit where the patient is located. Vasquez Ansari, YUDELKA    Lungs: No wheezing  Heart:  RRR with no Murmur/Rubs/Gallops    Additional Comments:  Main issue is need for plasmapheresis. Will ask hematology to assume care and cont. To follow with you    I have spoken with and examined the patient. I agree with the above assessment and plan as documented.     Geovani Sheppard MD

## 2017-06-26 NOTE — PROGRESS NOTES
Inpatient Hematology / Oncology Progress Note      Admission Date: 6/15/2017 11:50 AM  Reason for Admission/Hospital Course: Lung mass [R91.8]  Dysphagia, unspecified [R13.10]  Esophageal stricture [K22.2]    24 Hour Events:  Afebrile and vitals stable  Feeling well still today  Planning for PLEX again this afternoon  Hypoxic overnight requiring O2 supplement      ROS:  Constitutional: + fatigue Negative for fever, chills, weakness, malaise. CV: Positive for BLE edema; negative for chest pain, palpitations. Respiratory: Negative for dyspnea, cough, wheezing. GI: Negative for nausea, abdominal pain, diarrhea. 10 point review of systems is otherwise negative with the exception of the elements mentioned above in the HPI. No Known Allergies    OBJECTIVE:  Patient Vitals for the past 8 hrs:   BP Temp Pulse Resp SpO2   17 1319 90/58 - 90 - -   17 1100 (!) 88/56 98.3 °F (36.8 °C) 76 17 95 %   17 0721 114/72 98.4 °F (36.9 °C) (!) 56 19 98 %     Temp (24hrs), Av.5 °F (36.9 °C), Min:97.6 °F (36.4 °C), Max:99.1 °F (37.3 °C)     0701 -  1900  In: 480 [P.O.:480]  Out: -     Physical Exam:  Constitutional: Well developed, well nourished male in no acute distress, sitting  comfortably in the hospital bed. HEENT: Normocephalic and atraumatic. Oropharynx is clear, mucous membranes are moist. Sclerae anicteric. Neck supple    Skin Warm and dry. No bruising and no rash noted. No erythema. No pallor. Respiratory Lungs are clear to auscultation bilaterally without wheezes, rales or rhonchi, normal air exchange without accessory muscle use. CVS Normal rate, regular rhythm and normal S1 and S2. No murmurs, gallops, or rubs. Abdomen RUQ tenderness with palpation-he states is chronic. Soft and nondistended, normoactive bowel sounds. No palpable mass. Neuro Grossly nonfocal with no obvious sensory or motor deficits.    MSK Normal range of motion in general.  No edema and no tenderness. Psych Appropriate mood and affect. Right neck temp apheresis cath with small old blood noted to dressing    Labs:      Recent Labs      06/24/17   0510   WBC  11.7*   RBC  3.85*   HGB  10.0*   HCT  31.6*   MCV  82.1   MCH  26.0*   MCHC  31.6   RDW  19.0*   PLT  284   GRANS  75   LYMPH  21   MONOS  3*   EOS  0*   BASOS  0   IG  0.9   DF  AUTOMATED   ANEU  8.8*   ABL  2.5   ABM  0.3   YEMI  0.0   ABB  0.0   AIG  0.1        Recent Labs      06/24/17   0510   NA  144   K  3.6   CL  106   CO2  33*   AGAP  5*   GLU  81   BUN  7   CREA  0.69*   GFRAA  >60   GFRNA  >60   CA  8.2*   SGOT  21   AP  71   TP  5.4*   ALB  2.7*   GLOB  2.7   AGRAT  1.0*   MG  2.0         Imaging:  CT Chest 6/15/17  HISTORY: Lower extremities pain and swelling. Pulmonary infiltrates.     EXAM: Contiguous axial CT images were obtained from the thoracic inlet to the  upper abdomen following the uneventful IV administration of 100 mL Isovue-370. Coronal reformations were performed.     Radiation dose reduction techniques were used for this study. All CT scans  performed at this facility use one or all of the following: Automated exposure  control, adjustment of the mA and/or kVp according to patient's size, and  iterative reconstruction.     PRIOR STUDY: 05/08/2017     FINDINGS: There is no evidence of pulmonary embolus through the subsegmental  level bilaterally. The main pulmonary artery is normal in size. There are  multiple mildly prominent mediastinal lymph nodes with a representative right  paratracheal lymph node measuring 18 x 10 mm. There are nonenlarged hilar lymph  nodes bilaterally. There is no pericardial effusion.     There has been progressive consolidation and volume loss in the right upper lobe  with areas of air bronchograms as well as multiple cavitary foci.  The  consolidative and cavitary process in the right lower lobe has also mildly  enlarged, now measuring 5.6 x 2.5 cm compared with 4.2 x 2.5 cm previously. There is a new 6 mm irregular, nodular density (image 72) located just anterior  and inferior to this larger process. There are minor groundglass opacities in  the left lower lobe, new from the prior exam. There is a background of  centrilobular emphysema. There is no pleural effusion. Several tiny nodular  densities in the right lung base (images 93-96) have developed since the prior  exam.     There are subcentimeter hypodensities in the left hepatic lobe. Several  calcified gallstones are present in the gallbladder. The remaining included  upper abdominal organs are unremarkable. There are no acute or concerning bony  lesions.     IMPRESSION  IMPRESSION:      1. Progressive consolidation and volume loss in the right upper lobe with new  areas of cavitation. The consolidative cavitary process in the right lower lobe  has also mildly enlarged, and several new irregular and groundglass nodules have  developed bilaterally. Findings are suggestive of an infectious process,  possibly fungal.     2. No evidence of pulmonary embolus.     3. Mildly prominent mediastinal lymph nodes which are likely reactive.     4. Cholelithiasis.         ASSESSMENT:    Problem List  Date Reviewed: 6/26/2017          Codes Class Noted    Edema ICD-10-CM: R60.9  ICD-9-CM: 782.3  6/25/2017        Schatzki's ring ICD-10-CM: K22.2  ICD-9-CM: 750.3  6/23/2017        Severe protein-calorie malnutrition ([facility] [address] (Chronic) ICD-10-CM: E43  ICD-9-CM: 262  6/20/2017        Goodpasture syndrome ([facility] [address] ICD-10-CM: M31.0  ICD-9-CM: 446.21  6/17/2017        BPH (benign prostatic hyperplasia) (Chronic) ICD-10-CM: N40.0  ICD-9-CM: 600.00  6/16/2017        Anxiety and depression (Chronic) ICD-10-CM: F41.9, F32.9  ICD-9-CM: 300.00, 311  6/15/2017        History of hepatitis C (Chronic) ICD-10-CM: Z86.19  ICD-9-CM: V12.09  6/15/2017    Overview Signed 8/7/2012 10:55 AM by Tylor Mtz III     S/p treatment             Chronic shoulder pain (Chronic) ICD-10-CM: M25.519, G89.29  ICD-9-CM: 719.41, 338.29  6/15/2017        HLD (hyperlipidemia) (Chronic) ICD-10-CM: E78.5  ICD-9-CM: 272.4  6/15/2017        * (Principal)Pulmonary cavitary lesion ICD-10-CM: J98.4  ICD-9-CM: 518.89  6/15/2017        Abnormal CXR ICD-10-CM: R93.8  ICD-9-CM: 793.2  6/15/2017        Elevated C-reactive protein (CRP) ICD-10-CM: R79.82  ICD-9-CM: 790.95  6/15/2017                PLAN:  Goodpasture syndrome   6/19: Mr Kene was informed of the apheresis process and purpose and is agreeable  IR was consulted today for temp cath placement. Once line is placed we will start plasmapheresis with the plan of daily for 5 days and then every other day. GBM antibody was 75 on admission. Per Pulmonary, he is scheduled for Bronch/EBUS tomorrow. He will continue on steroids and cytoxan.   6/21 S/p bronch yesterday and found white exudate false cords and diffuse inflammation, edema, erythema. BAL done and pending. Day 2 PLEX today. Plan to repeat levels in 1 week. Continue steroids/cytoxan  6/22 Clinically no changes. Day 3 PLEX today, will continue with steroids and cytoxan.   6/23 Day 4 PLEX today, continue steroids and cytoxan  6/24-Day 5 PLEX today-off tomorrow-resume Monday. Will have repeat quantitation of anti-GBM antibody on Monday. Continue steroids and Cytoxan. 6/26 - Plasma exchange decreased to every other day. Repeat anti GBM antibody today. Potentially ready for discharge. Discussed with pulmonary. Patient does not have transportation to the cancer center for pheresis treatments. Social work consulted. Hopefully home tomorrow with O2 at home PRN. We will gladly assume primary care of this patient    . Vamsi Eason NP   [facility]  [id number] [employer]  [address] [place] [address]  Office : [phone]  Fax : [phone]   Patient seen, examed and discussed with NP, agree with above history/assessment/plan. 61 y. o.male new dx Goodpasture syndrome finished plasmaphresis for 5 days, change to every other day, added cytoxan per Dr. Deena Salamanca, tolerated well, can continue rx outpt, follow Dr. Deena Salamanca in office for labs and further adjustment. Navarro Frye M.D.   [address]  Office : [phone]  Fax : [phone]

## 2017-06-26 NOTE — PROGRESS NOTES
Procedure: TPE  Dx: Ceci  Day: 6   Labs drawn: CBC CMP  Calcium used: 4 grams  Replacement product: FFP  Replace volume: 3416  /  Remove volume: 3624  TBV processed: 100%  Other parameters: TPV 1.2  [facility] blood tubing lot # (if FFP used): [id number]  Issues: Hypotension before procedure started - IVF given during procedure NS@ 450ml/hr. Tolerated well. Next procedure date:  Wednesday 6/28 - possibly in Infusion - Discharge dependent on transportation availability  Labs needed: CBC CMP Mg

## 2017-06-26 NOTE — PROGRESS NOTES
Pt BP 90/58 pt request pain, gave Roxicodone po for complaints of pain 9/10. Will continue to monitor.

## 2017-06-27 LAB
ANNOTATION COMMENT IMP: NORMAL
BLD PROD TYP BPU: NORMAL
BPU ID: NORMAL
M TB IFN-G CD4+ BCKGRND COR BLD-ACNC: 0.01 IU/ML
M TB IFN-G CD4+ T-CELLS BLD-ACNC: 0.07 IU/ML
M TB TUBERC IFN-G BLD QL: NEGATIVE
M TB TUBERC IGNF/MITOGEN IGNF CONTROL: 9.83 IU/ML
QUANTIFERON NIL VALUE: 0.06 IU/ML
SERVICE CMNT-IMP: NORMAL
STATUS OF UNIT,%ST: NORMAL
UNIT DIVISION, %UDIV: 0
UNIT DIVISION, %UDIV: NORMAL

## 2017-06-27 PROCEDURE — 94760 N-INVAS EAR/PLS OXIMETRY 1: CPT

## 2017-06-27 PROCEDURE — 74011250636 HC RX REV CODE- 250/636: Performed by: INTERNAL MEDICINE

## 2017-06-27 PROCEDURE — 99232 SBSQ HOSP IP/OBS MODERATE 35: CPT | Performed by: INTERNAL MEDICINE

## 2017-06-27 PROCEDURE — 65270000029 HC RM PRIVATE

## 2017-06-27 PROCEDURE — 83516 IMMUNOASSAY NONANTIBODY: CPT | Performed by: NURSE PRACTITIONER

## 2017-06-27 PROCEDURE — 74011250637 HC RX REV CODE- 250/637: Performed by: PHYSICIAN ASSISTANT

## 2017-06-27 PROCEDURE — 77010033678 HC OXYGEN DAILY

## 2017-06-27 RX ADMIN — FUROSEMIDE 40 MG: 10 INJECTION, SOLUTION INTRAMUSCULAR; INTRAVENOUS at 22:01

## 2017-06-27 RX ADMIN — DESVENLAFAXINE 100 MG: 100 TABLET, EXTENDED RELEASE ORAL at 08:14

## 2017-06-27 RX ADMIN — OXYCODONE HYDROCHLORIDE 30 MG: 15 TABLET ORAL at 18:42

## 2017-06-27 RX ADMIN — DIAZEPAM 10 MG: 5 TABLET ORAL at 15:32

## 2017-06-27 RX ADMIN — CYCLOPHOSPHAMIDE 150 MG: 50 CAPSULE ORAL at 22:01

## 2017-06-27 RX ADMIN — BACLOFEN 20 MG: 10 TABLET ORAL at 22:01

## 2017-06-27 RX ADMIN — GABAPENTIN 1200 MG: 400 CAPSULE ORAL at 08:14

## 2017-06-27 RX ADMIN — Medication 5 ML: at 06:08

## 2017-06-27 RX ADMIN — DEXTROAMPHETAMINE SACCHARATE, AMPHETAMINE ASPARTATE, DEXTROAMPHETAMINE SULFATE AND AMPHETAMINE SULFATE 30 MG: 3.75; 3.75; 3.75; 3.75 TABLET ORAL at 17:07

## 2017-06-27 RX ADMIN — DEXTROAMPHETAMINE SACCHARATE, AMPHETAMINE ASPARTATE, DEXTROAMPHETAMINE SULFATE AND AMPHETAMINE SULFATE 30 MG: 3.75; 3.75; 3.75; 3.75 TABLET ORAL at 08:14

## 2017-06-27 RX ADMIN — PANTOPRAZOLE SODIUM 40 MG: 40 TABLET, DELAYED RELEASE ORAL at 06:08

## 2017-06-27 RX ADMIN — GABAPENTIN 1200 MG: 400 CAPSULE ORAL at 22:01

## 2017-06-27 RX ADMIN — OXYCODONE HYDROCHLORIDE 30 MG: 15 TABLET ORAL at 14:20

## 2017-06-27 RX ADMIN — DIAZEPAM 10 MG: 5 TABLET ORAL at 20:30

## 2017-06-27 RX ADMIN — Medication 5 ML: at 15:12

## 2017-06-27 RX ADMIN — DEXTROAMPHETAMINE SACCHARATE, AMPHETAMINE ASPARTATE, DEXTROAMPHETAMINE SULFATE AND AMPHETAMINE SULFATE 30 MG: 3.75; 3.75; 3.75; 3.75 TABLET ORAL at 11:41

## 2017-06-27 RX ADMIN — OXYCODONE HYDROCHLORIDE 30 MG: 15 TABLET ORAL at 10:14

## 2017-06-27 RX ADMIN — BACLOFEN 20 MG: 10 TABLET ORAL at 08:14

## 2017-06-27 RX ADMIN — DESVENLAFAXINE 100 MG: 100 TABLET, EXTENDED RELEASE ORAL at 17:06

## 2017-06-27 RX ADMIN — BACLOFEN 20 MG: 10 TABLET ORAL at 17:07

## 2017-06-27 RX ADMIN — GABAPENTIN 1200 MG: 400 CAPSULE ORAL at 17:07

## 2017-06-27 RX ADMIN — DIAZEPAM 10 MG: 5 TABLET ORAL at 11:41

## 2017-06-27 RX ADMIN — DIAZEPAM 10 MG: 5 TABLET ORAL at 02:37

## 2017-06-27 RX ADMIN — LAMOTRIGINE 100 MG: 100 TABLET ORAL at 08:14

## 2017-06-27 RX ADMIN — B-COMPLEX W/ C & FOLIC ACID TAB 1 TABLET: TAB at 08:14

## 2017-06-27 RX ADMIN — FUROSEMIDE 40 MG: 10 INJECTION, SOLUTION INTRAMUSCULAR; INTRAVENOUS at 08:14

## 2017-06-27 RX ADMIN — MIRTAZAPINE 30 MG: 15 TABLET, FILM COATED ORAL at 22:01

## 2017-06-27 NOTE — PROGRESS NOTES
Pt requesting pain medication and Valium. Pt informed that it was not time for his oxycodone yet, but possibly could have Valium if blood pressure OK. BP 90/68 manually, informed pt that we would check again in 30min to see if BP improved.

## 2017-06-27 NOTE — PROGRESS NOTES
Inpatient Hematology / Oncology Progress Note      Admission Date: 6/15/2017 11:50 AM  Reason for Admission/Hospital Course: Lung mass [R91.8]  Dysphagia, unspecified [R13.10]  Esophageal stricture [K22.2]    24 Hour Events:  Afebrile and vitals stable  No in good spirits today  Mumbling about going home tomorrow and \"finding a ride for treatments\"  PLEX yesterday. Off today    ROS:  Constitutional: + fatigue Negative for fever, chills, weakness, malaise. CV: Positive for BLE edema; negative for chest pain, palpitations. Respiratory: Negative for dyspnea, cough, wheezing. GI: Negative for nausea, abdominal pain, diarrhea. 10 point review of systems is otherwise negative with the exception of the elements mentioned above in the HPI. No Known Allergies    OBJECTIVE:  Patient Vitals for the past 8 hrs:   BP Temp Pulse Resp SpO2   17 1136 94/65 98.5 °F (36.9 °C) 87 17 91 %   17 0720 107/79 98.4 °F (36.9 °C) 67 18 96 %     Temp (24hrs), Av.3 °F (36.8 °C), Min:97.9 °F (36.6 °C), Max:98.5 °F (36.9 °C)     0701 -  1900  In: 240 [P.O.:240]  Out: -     Physical Exam:  Constitutional: Well developed, well nourished male in no acute distress, sitting  comfortably in the hospital bed. HEENT: Normocephalic and atraumatic. Oropharynx is clear, mucous membranes are moist. Sclerae anicteric. Neck supple    Skin Warm and dry. No bruising and no rash noted. No erythema. No pallor. Respiratory Lungs are clear to auscultation bilaterally without wheezes, rales or rhonchi, normal air exchange without accessory muscle use. CVS Normal rate, regular rhythm and normal S1 and S2. No murmurs, gallops, or rubs. Abdomen RUQ tenderness with palpation-he states is chronic. Soft and nondistended, normoactive bowel sounds. No palpable mass. Neuro Grossly nonfocal with no obvious sensory or motor deficits. MSK Normal range of motion in general.  No edema and no tenderness.    Psych Appropriate mood and affect. Right neck temp apheresis cath - dressing C/D/I    Labs:      Recent Labs      06/26/17 1955   WBC  10.0   RBC  3.87*   HGB  10.1*   HCT  32.0*   MCV  82.7   MCH  26.1   MCHC  31.6   RDW  20.2*   PLT  234   GRANS  72   LYMPH  24   MONOS  4   EOS  0*   BASOS  0   IG  0.5   DF  AUTOMATED   ANEU  7.2   ABL  2.4   ABM  0.4   YEMI  0.0   ABB  0.0   AIG  0.1        Recent Labs      06/26/17 1955   NA  145   K  3.3*   CL  103   CO2  35*   AGAP  7   GLU  113*   BUN  8   CREA  1.07   GFRAA  >60   GFRNA  >60   CA  8.7   SGOT  24   AP  74   TP  6.1*   ALB  2.9*   GLOB  3.2   AGRAT  0.9*         Imaging:  CT Chest 6/15/17  HISTORY: Lower extremities pain and swelling. Pulmonary infiltrates.     EXAM: Contiguous axial CT images were obtained from the thoracic inlet to the  upper abdomen following the uneventful IV administration of 100 mL Isovue-370. Coronal reformations were performed.     Radiation dose reduction techniques were used for this study. All CT scans  performed at this facility use one or all of the following: Automated exposure  control, adjustment of the mA and/or kVp according to patient's size, and  iterative reconstruction.     PRIOR STUDY: 05/08/2017     FINDINGS: There is no evidence of pulmonary embolus through the subsegmental  level bilaterally. The main pulmonary artery is normal in size. There are  multiple mildly prominent mediastinal lymph nodes with a representative right  paratracheal lymph node measuring 18 x 10 mm. There are nonenlarged hilar lymph  nodes bilaterally. There is no pericardial effusion.     There has been progressive consolidation and volume loss in the right upper lobe  with areas of air bronchograms as well as multiple cavitary foci. The  consolidative and cavitary process in the right lower lobe has also mildly  enlarged, now measuring 5.6 x 2.5 cm compared with 4.2 x 2.5 cm previously.   There is a new 6 mm irregular, nodular density (image 72) located just anterior  and inferior to this larger process. There are minor groundglass opacities in  the left lower lobe, new from the prior exam. There is a background of  centrilobular emphysema. There is no pleural effusion. Several tiny nodular  densities in the right lung base (images 93-96) have developed since the prior  exam.     There are subcentimeter hypodensities in the left hepatic lobe. Several  calcified gallstones are present in the gallbladder. The remaining included  upper abdominal organs are unremarkable. There are no acute or concerning bony  lesions.     IMPRESSION  IMPRESSION:      1. Progressive consolidation and volume loss in the right upper lobe with new  areas of cavitation. The consolidative cavitary process in the right lower lobe  has also mildly enlarged, and several new irregular and groundglass nodules have  developed bilaterally. Findings are suggestive of an infectious process,  possibly fungal.     2. No evidence of pulmonary embolus.     3. Mildly prominent mediastinal lymph nodes which are likely reactive.     4. Cholelithiasis.         ASSESSMENT:    Problem List  Date Reviewed: 6/27/2017          Codes Class Noted    Edema ICD-10-CM: R60.9  ICD-9-CM: 782.3  6/25/2017        Schatzki's ring ICD-10-CM: K22.2  ICD-9-CM: 750.3  6/23/2017        Severe protein-calorie malnutrition ([facility] [address] (Chronic) ICD-10-CM: E43  ICD-9-CM: 262  6/20/2017        Goodpasture syndrome ([facility] [address] ICD-10-CM: M31.0  ICD-9-CM: 446.21  6/17/2017        BPH (benign prostatic hyperplasia) (Chronic) ICD-10-CM: N40.0  ICD-9-CM: 600.00  6/16/2017        Anxiety and depression (Chronic) ICD-10-CM: F41.9, F32.9  ICD-9-CM: 300.00, 311  6/15/2017        History of hepatitis C (Chronic) ICD-10-CM: Z86.19  ICD-9-CM: V12.09  6/15/2017    Overview Signed 8/7/2012 10:55 AM by Tylor Mtz III     S/p treatment             Chronic shoulder pain (Chronic) ICD-10-CM: M25.519, G89.29  ICD-9-CM: 719.41, 338.29  6/15/2017 HLD (hyperlipidemia) (Chronic) ICD-10-CM: E78.5  ICD-9-CM: 272.4  6/15/2017        * (Principal)Pulmonary cavitary lesion ICD-10-CM: J98.4  ICD-9-CM: 518.89  6/15/2017        Abnormal CXR ICD-10-CM: R93.8  ICD-9-CM: 793.2  6/15/2017        Elevated C-reactive protein (CRP) ICD-10-CM: R79.82  ICD-9-CM: 790.95  6/15/2017                PLAN:  Goodpasture syndrome   6/19: Mr Keen was informed of the apheresis process and purpose and is agreeable  IR was consulted today for temp cath placement. Once line is placed we will start plasmapheresis with the plan of daily for 5 days and then every other day. GBM antibody was 75 on admission. Per Pulmonary, he is scheduled for Bronch/EBUS tomorrow. He will continue on steroids and cytoxan.   6/21 S/p bronch yesterday and found white exudate false cords and diffuse inflammation, edema, erythema. BAL done and pending. Day 2 PLEX today. Plan to repeat levels in 1 week. Continue steroids/cytoxan  6/22 Clinically no changes. Day 3 PLEX today, will continue with steroids and cytoxan.   6/23 Day 4 PLEX today, continue steroids and cytoxan  6/24-Day 5 PLEX today-off tomorrow-resume Monday. Will have repeat quantitation of anti-GBM antibody on Monday. Continue steroids and Cytoxan. 6/26 - Plasma exchange decreased to every other day. Repeat anti GBM antibody today. Potentially ready for discharge. Discussed with pulmonary. Patient does not have transportation to the cancer center for pheresis treatments. Social work consulted. Hopefully home tomorrow with O2 at home PRN. We will gladly assume primary care of this patient  6/27 No exchange today. Repeat tomorrow and should be able to go home with every other day PLEX treatments x 1 week and office follow ups with pulmonary and nephrology. Transportation seems to be an issue and seen by social work yesterday. Apparently today feels he could go after treatment tomorrow and find a ride     .      Dana Ramos NP   [facility] Hematology & Oncology  [address] [id number] [facility]  Office : [phone]  Fax : [phone]     Patient seen, examed and discussed with NP, agree with above history/assessment/plan. 61 y. o.male new dx Goodpasture syndrome finished plasmaphresis for 5 days, change to every other day, added cytoxan per Dr. Hernan Mendiola, tolerated well, can continue rx outpt, follow Dr. Hernan Mendiola in office for labs and further adjustment, try to arrange transportation.  Alina Limon M.D.   [address]  Office : [phone]  Fax : [phone]

## 2017-06-27 NOTE — PROGRESS NOTES
Pt up ad leah, resting in bed. Pt A&Ox4 on RA. CBC, BMP drawn and sent down to lab. No s/s of distress noted at this time. Call light in place, pt instructed to call for assistance as needed.

## 2017-06-27 NOTE — PROGRESS NOTES
Pt requested pain medication, pt states \" I have gone to long without it\" informed the pt the pain medication it prn q 4 hours, that if requested it will be given, that the med is not scheduled. Gave Roxicodone 30 mg po will continue to monitor.

## 2017-06-27 NOTE — PROGRESS NOTES
Pt up in bed eating breakfast, pt voiced no complaints of any at the present time. When administering medications, pt states \"is everything here\" nurse replies, \"All medications that are scheduled for this time are there\" pt states \" I have not had any medications since 0300. \" nurse informed the pt that night shift nurse gave medication at 0600. Pt states \" but that is all I received\" explained to the pt that the 0600 med was the only med scheduled.

## 2017-06-27 NOTE — PROGRESS NOTES
Pt requesting \"pain pill and valium at the same time. \" Woke pt up to take BP, pt found with hand loosely curled around e-cig/vape(?). When pt asked, pt covers device with hand and states \"this is my security blanket, it's just something to hold. \" When asked what he was smoking, pt states, \"there's nothing in there, it's just for comfort. \" Device no longer visible upon return to room a few minutes later.

## 2017-06-27 NOTE — PROGRESS NOTES
Infectious Disease Progress Note    Today's Date: 2017   Admit Date: 6/15/2017    Impression:   · Tick Bite: occurred 1-2 months ago and was not associated with any febrile illness. Very unlikely to be related to patient's pulmonary findings and anti-GBM serology. Would not pursue any workup related to this  · Cavitary Lung Disease: s/p Bronch; so far routine culture negative; PJP negative; AFB smear negative; serum crypto ag negative; pathology negative; HSV and CMV negative. HIV serology negative  · HCV: patient recalls being treated several years ago with daily interferon and states all testing done afterwards was negative. HCV PCR not detected  · Anti-GBM Disease    Plan:   ·  Quantiferon gold pending but AFB from bronch NGTD, smear negative. · Would continue to observe off antibiotics. · Hem/Onc with outpt plans for every other day aphresis and cytoxan. **Will sign off at this time. Please call with questions or concerns. Anti-infectives:     Inpat Anti-Infectives     Start     Ordered Stop    06/15/17 1500  cefTRIAXone (ROCEPHIN) 1 g in 0.9% sodium chloride (MBP/ADV) 50 mL  1 g,   IntraVENous,   EVERY 24 HOURS      06/15/17 1408 --    06/15/17 1500  azithromycin (ZITHROMAX) 500 mg in 0.9% sodium chloride (MBP/ADV) 250 mL  500 mg,   IntraVENous,   EVERY 24 HOURS      06/15/17 1408 --          Subjective:   Anxious for DC today. Denies nausea, vomiting, diarrhea, fever, chills, or sweats      No Known Allergies     Review of Systems:  A comprehensive review of systems was negative except for that written in the History of Present Illness.     Objective:     Visit Vitals    /79 (BP 1 Location: Right arm, BP Patient Position: At rest)    Pulse 67    Temp 98.4 °F (36.9 °C)    Resp 18    Ht 5' 7\" (1.702 m)    Wt 75.6 kg (166 lb 11.2 oz)    SpO2 96%    BMI 26.11 kg/m2     Temp (24hrs), Av.2 °F (36.8 °C), Min:97.9 °F (36.6 °C), Max:98.4 °F (36.9 °C)       Lines:  Central Venous Catheter:  Right IJ phoresis catheter (06/20/17)          Physical Exam:    General:  Alert, cooperative, well noursished, well developed, appears stated age   Eyes:  Sclera anicteric. Pupils equally round and reactive to light. Mouth/Throat: Mucous membranes normal, oral pharynx clear, edentulous   Neck: Supple   Lungs:   Clear to auscultation bilaterally, good effort   CV:  Regular rate and rhythm,no murmur, click, rub or gallop   Abdomen:   Soft, non-tender. bowel sounds normal. non-distended   Extremities: No cyanosis or edema   Skin: Skin color, texture, turgor normal. no acute rash or lesions; back area without any evidence of previous tick bite, no specific site identified   Lymph nodes: Cervical and supraclavicular normal   Musculoskeletal: No swelling or deformity   Lines/Devices:  Intact, no erythema, drainage or tenderness   Psych: Alert and oriented, normal mood affect given the setting         Data Review:     CBC:  Recent Labs      06/26/17 1955   WBC  10.0   GRANS  72   MONOS  4   EOS  0*   ANEU  7.2   ABL  2.4   HGB  10.1*   HCT  32.0*   PLT  234       BMP:  Recent Labs      06/26/17 1955   CREA  1.07   BUN  8   NA  145   K  3.3*   CL  103   CO2  35*   AGAP  7   GLU  113*       LFTS:  Recent Labs      06/26/17 1955   TBILI  0.3   ALT  26   SGOT  24   AP  74   TP  6.1*   ALB  2.9*       Microbiology:     All Micro Results     Procedure Component Value Units Date/Time    CYTOMEGALOVIRUS (CMV) CULTURE [[id number]] Collected:  06/20/17 1410    Order Status:  Completed Specimen:  Miscellaneous sample Updated:  06/27/17 0937     Source BRONCHIAL LAVAGE        Cytomegalovirus (CMV) Culture Comment         (NOTE)  No Cytomegalovirus detected in the shell vial culture. Conventional  tissue culture results to follow. Performed At: [address] [id number]  Tyesha Ott MD AV:[id number]         A.  GALACTOMANNAN BY EIA, BRONCHOALVEOLAR LAVAGE [[id number]] Collected:  06/20/17 1410    Order Status:  Completed Specimen:  Bronchial lavage Updated:  06/26/17 1059     A. GALACTOMANNAN, AGPT 0.073         (NOTE)  Reference Value: Index value less than 0.5. Interpretation:  Patients with an index value of greater  than or equal to 0.5 are considered to be positive for  galactomannan antigen. The Platelia(TM) Aspergillus EIA  package insert also recommends a new sample be collected  from the patient for follow-up testing. Patients with an  index value of less than 0.5 are considered to be negative  for galactomannan antigen. A negative result may indicate  that the patients result is below the detectable level of  the assay. Negative results do not rule out the diagnosis of Invasive  Aspergillosis. Pursuant to the package insert, repeat  testing is recommended if the result is negative, but the  disease is suspected. Due to the potential for  environmental contamination when transferred to pour-off  tubes, which can lead to false positive results, interpret  positive results from samples provided in pour-off tubes  with caution. Results should be used in conjunction with  clinical findings, and should not form the sole basis for  a diagnosis or treatment decision. The Platelia Aspergillus Galactomannan EIA is a product of  [employer] and is FDA approved for in vitro diagnostic use. Performed At: [facility] [place] [id number]  Renae Murillo PhD HL:[id number]         ATYPICAL PNEUMONIA BY PCR,BRONCHOALVEOLAR LAVAGE - INCLUDESJodie Emmer AND CHLAMYDIA PNEUMONIAE [[id number]] Collected:  06/20/17 1410    Order Status:  Completed Specimen:  Bronchial lavage Updated:  06/26/17 1059     M. PNEUMONIAE, MPPT Not Detected     C. PNEUMONAIE, CPPT Not Detected      (NOTE)  Expected Value: Not Detected  This test was developed and its performance  characteristics determined by [employer].  It has not  been cleared or approved by the U.S. Food and Drug  Administration. Results should be used in conjunction with  clinical findings, and should not form the sole basis for  a diagnosis or treatment decision. PCR tests are performed pursuant to a license agreement  with [employer]. Performed At: [employer] [facility] [employer]  [address],[facility] [address] [place], [place] [id number]  lCau Figures PhD SA:[id number]         L. PNEUMOPHILA BY Senthil Davidson [[id number]] Collected:  06/20/17 1410    Order Status:  Completed Specimen:  Bronchial lavage Updated:  06/26/17 1059     L. PNEUMOPHILA, LPNPT Not Detected      (NOTE)  Expected Value: Not Detected  This test was developed and its performance  characteristics determined by [employer]. It has not  been cleared or approved by the U.S. Food and Drug  Administration. Results should be used in conjunction with  clinical findings, and should not form the sole basis for  a diagnosis or treatment decision. PCR tests are performed pursuant to a license agreement  with [employer].   Performed At: [address] [place], [place] [id number]  Clau Figures PhD BH:[id number]         AFB (MYCOBACTERIUM) CULTURE & SMEAR W/REFLEX ID Pauline Daniel [[id number]] Collected:  06/20/17 1410    Order Status:  Completed Specimen:  Miscellaneous sample Updated:  06/26/17 1059     Source BRONCHIAL LAVAGE        AFB Specimen processing Concentration     Acid Fast Smear NEGATIVE          (NOTE)  Performed At: [address] [id number]  Chanel Bhat MD LQ:[id number]          Acid Fast Culture PENDING    ADENOVIRUS BY PCR, BAL [[id number]] Collected:  06/20/17 1410    Order Status:  Completed Specimen:  Bronchial lavage Updated:  06/26/17 1059     Adenovirus Not Detected copies/mL       (NOTE)  Assay Range: 38 copies/mL to 7q24r50 copies/mL  Expected Value: Not Detected  The limit of quantitation (LOQ) is 38 copies/mL. Numerical  results below the lower limit of quantitation are reported  as Detected: <38 copies/mL. This test was developed and its performance  characteristics determined by [employer]. It has not  been cleared or approved by the U.S. Food and Drug  Administration. Results should be used in conjunction with  clinical findings, and should not form the sole basis for  a diagnosis or treatment decision. PCR tests are performed pursuant to a license agreement  with [employer]. Performed At: [facility] [facility] [place] [id number]  Sarah Liu PhD SL:[id number]         CMV BY PCR, QT, BAL [[id number]] Collected:  06/20/17 1410    Order Status:  Completed Updated:  06/26/17 0939    CMV BY PCR, QT [[id number]] Collected:  06/20/17 1410    Order Status:  Completed Specimen:  Whole Blood Updated:  06/26/17 0939     Specimen source BRONCHIAL LAVAGE        CMV by Estefania Rizvi.          RESULTS SCANNED IN [facility]      PERFORMED BY [facility]       ANAEROBIC CULTURE - ONLY PERFORMED ON BRONCHIAL BRUSHING [[id number]] Collected:  06/20/17 1410    Order Status:  Completed Specimen:  Bronchial lavage Updated:  06/26/17 0829     Special Requests: NO SPECIAL REQUESTS        Culture result:         NO ANAEROBES ISOLATED 6 DAYS    RESPIRATORY VIRAL PANEL, PCR [[id number]] Collected:  06/20/17 1410    Order Status:  Completed Specimen:  Respiratory sample Updated:  06/23/17 2236     Source BRONCHIAL LAVAGE        Influenza A NEGATIVE         Influenza B NEGATIVE         RSV A NEGATIVE         RSV B NEGATIVE         Parainfluenza 1 NEGATIVE         Parainfluenza 2 NEGATIVE         Parainfluenza 3 NEGATIVE         Rhinovirus NEGATIVE         Metapneumovirus NEGATIVE         Adenovirus NEGATIVE          (NOTE)  Performed At: [address] [id number]  Roberto Linton MD WH:[id number] PNEUMOCYSTIS JIROVECI - PCR [[id number]] Collected:  06/20/17 1410    Order Status:  Completed Specimen:  Miscellaneous sample Updated:  06/23/17 0627     Specimen source BRONCHIAL LAVAGE        P. jiroveci - PCR         RESULTS SCANNED IN [facility]    HSV W/ TYPING: CULTURE [[id number]] Collected:  06/20/17 1410    Order Status:  Completed Specimen:  Miscellaneous sample Updated:  06/22/17 2141     Source BRONCHIAL LAVAGE        HSV culture w typing Comment         (NOTE)  Negative  No Herpes simplex virus isolated. Performed At: [facility]  [place] [address] [place] [id number]  Katharine Richmond MD SUSANNA:[id number]         [facility] CULTURE AND SMEAR [place] Gavel [[id number]] Collected:  06/20/17 1410    Order Status:  Completed Specimen:  Other from Miscellaneous sample Updated:  06/22/17 2106     Source BRONCHIAL LAVAGE         Corrected on 06/20 AT 1628: This is a correction to the medical record of the test results previously reported as BIOPSY RUL, Corrected on 06/20 AT 1524: This is a correction to the medical record of the test results previously reported as BRONCHIAL LAVAGE        Fungus stain Direct Inoculation     Fungus (Mycology) Culture Other source received      (NOTE)  Performed At: [facility]  [facility] [id number]  Katharine Richmond MD MP:[id number]         [facility] CULTURE AND SMEAR [place] [[id number]] Collected:  06/20/17 1410    Order Status:  Completed Specimen:  Other from Miscellaneous sample Updated:  06/22/17 2106     Source BRONCHIAL LAVAGE         Corrected on 06/20 AT 1548:  This is a correction to the medical record of the test results previously reported as LUNG BIOPSY        Fungus stain Direct Inoculation     Fungus (Mycology) Culture Other source received      (NOTE)  Performed At: [facility] [address] [place] [id number]  Katharine Richmond MD RI:[id number]         PNEUMOCYSTIS JIROVECI - PCR [[id number]] Collected:  06/20/17 1410    Order Status:  Completed Specimen:  Miscellaneous sample Updated:  06/22/17 1429     Specimen source BRONCHIAL LAVAGE        P. jiroveci - PCR         RESULTS SCANNED IN [facility]    CRYPTOCOCCUS AG [facility] TITER [[id number]] Collected:  06/22/17 0540    Order Status:  Completed Specimen:  Serum from Serum Updated:  06/22/17 1326     Cryptococcus Ag NEGATIVE        CULTURE, RESPIRATORY/SPUTUM/BRONCH Massiel Mason STAIN [[id number]] Collected:  06/20/17 1410    Order Status:  Completed Specimen:  Sputum from Bronchial lavage Updated:  06/22/17 1016     Special Requests: NO SPECIAL REQUESTS        GRAM STAIN NO  WBCS SEEN         NO DEFINITE ORGANISM SEEN         NO  EPITHELIAL CELLS SEEN        Culture result: NO GROWTH 2 DAYS       QUANTIFERON TB GOLD [[id number]] Collected:  06/21/17 1800    Order Status:  Canceled Specimen:  Whole Blood from Blood Updated:  06/21/17 1827    CMV BY PCR, QT [[id number]] Collected:  06/20/17 1410    Order Status:  Canceled Specimen:  Blood Updated:  06/20/17 1539    CULTURE, BLOOD [[id number]] Collected:  06/15/17 1458    Order Status:  Completed Specimen:  Blood from Blood Updated:  06/20/17 0830     Special Requests: LEFT ANTECUBITAL        Culture result: NO GROWTH 5 DAYS       CULTURE, BLOOD [[id number]] Collected:  06/15/17 1500    Order Status:  Completed Specimen:  Blood from Blood Updated:  06/20/17 0830     Special Requests: RIGHT ANTECUBITAL        Culture result: NO GROWTH 5 DAYS             Imaging:   Chest CT (06/15/17): IMPRESSION  IMPRESSION:      1. Progressive consolidation and volume loss in the right upper lobe with new  areas of cavitation. The consolidative cavitary process in the right lower lobe  has also mildly enlarged, and several new irregular and groundglass nodules have  developed bilaterally. Findings are suggestive of an infectious process,  possibly fungal.     2.  No evidence of pulmonary embolus.     3. Mildly prominent mediastinal lymph nodes which are likely reactive.     4. Cholelithiasis.     Signed By: Serenity Jerry NP     June 27, 2017

## 2017-06-27 NOTE — PROGRESS NOTES
Prakash Gutierrez Adcox  Admission Date: 6/15/2017             Daily Progress Note: 6/27/2017    The patient's chart is reviewed and the patient is discussed with the staff. 62 yo male presents with chills, shortness of breath, and edema. CT chest 4/21/17 revealed RLL 4.4 cm x 3.1cm density. Had chest CT 5/8/17 with multiple large infiltrative nodules in R lung concerning for cavitation. Was seen 6/8 by Dr. Daksha Bello and started on Levaquin on 6/9. Was not any better, was hypoxic on RA, and was referred for admission / ongoing workup of cavitary lesion. History of smoking <1 ppd x 10 years off and on, quit in 2014. CRP elevated at 283. Anti GBM Ab +- initiated on Cyclophosphamide and pulse dose steroid 06/17/17. Nephrology consulted with no plans for renal bx (normal renal function / no blood or microscopic protein in urine). Also seen by heme/Onc with plans for plasma pheresis.  Chronic depression, anxiety, ADD,Hepatitis C, BPH, and chronic pain. Has been feeling poorly, lost 40lbs over 3 months secondary to anorexia.  Weaned to room air but using O2 prn. Undergoing plasmapheresis per hematology. AN with desat 1hr. Subjective:     Remains on O2 2 L NC. Denies shortness of breath, has rare , nonproductive cough. States was weak after plasma exchange yesterday and was \"blind-sided\" yesterday when told he could go home and come in for exchanges. Does not want to go home and come back for treatment and worried the catheter will cause him problems.       Current Facility-Administered Medications   Medication Dose Route Frequency    furosemide (LASIX) injection 40 mg  40 mg IntraVENous BID    0.9% sodium chloride infusion 250 mL  250 mL IntraVENous PRN    0.9% sodium chloride infusion 1,000 mL  1,000 mL IntraVENous PRN    heparin (PF) 2 units/ml in NS infusion 2,000 Units  1,000 mL Irrigation Multiple    sodium chloride (NS) flush 5-10 mL  5-10 mL IntraVENous Q8H    sodium chloride (NS) flush 5-10 mL  5-10 mL IntraVENous PRN    lidocaine (XYLOCAINE) 2 % jelly   Mucous Membrane PRN    acetaminophen (TYLENOL) tablet 650 mg  650 mg Oral Q4H PRN    diphenhydrAMINE (BENADRYL) capsule 25 mg  25 mg Oral Q6H PRN    cyclophosphamide (CYTOXAN) chemo capsule 150 mg  150 mg Oral Q24H    dextroamphetamine-amphetamine (ADDERALL) tablet 30 mg  30 mg Oral TID    baclofen (LIORESAL) tablet 20 mg  20 mg Oral TID    DESVENLAFAXINE SUCCINATE (PRISTIQ PO) - patient supplied  100 mg Oral BID    doxazosin (CARDURA) tablet 4 mg  4 mg Oral DAILY    gabapentin (NEURONTIN) capsule 1,200 mg  1,200 mg Oral TID    lamoTRIgine (LaMICtal) tablet 100 mg  100 mg Oral DAILY    mirtazapine (REMERON) tablet 30 mg  30 mg Oral QHS    multivitamin, stress formula (STRESS TAB) tablet 1 Tab  1 Tab Oral DAILY    pantoprazole (PROTONIX) tablet 40 mg  40 mg Oral ACB    oxyCODONE IR (OXY-IR) immediate release tablet 30 mg  30 mg Oral Q4H PRN    diazePAM (VALIUM) tablet 10 mg  10 mg Oral Q4H PRN    sodium chloride (NS) flush 5-10 mL  5-10 mL IntraVENous Q8H    sodium chloride (NS) flush 5-10 mL  5-10 mL IntraVENous PRN    albuterol (PROVENTIL VENTOLIN) nebulizer solution 2.5 mg  2.5 mg Nebulization Q6H PRN    acetaminophen (TYLENOL) tablet 650 mg  650 mg Oral Q4H PRN    naloxone (NARCAN) injection 0.4 mg  0.4 mg IntraVENous PRN    ondansetron (ZOFRAN) injection 4 mg  4 mg IntraVENous Q4H PRN    senna-docusate (PERICOLACE) 8.6-50 mg per tablet 1 Tab  1 Tab Oral BID PRN       Review of Systems  Constitutional: negative for fever, chills, sweats  Cardiovascular: LE edema, negative for chest pain, palpitations, syncope  Gastrointestinal:  negative for dysphagia, reflux, vomiting, diarrhea, abdominal pain, or melena  Neurologic:  negative for focal weakness, numbness, headache    Objective:     Vitals:    06/26/17 1949 06/26/17 2241 06/27/17 0238 06/27/17 0310   BP: 110/76 121/78 98/72 94/66   Pulse:  65  68   Resp:  18  18   Temp: 98.1 °F (36.7 °C)  98.3 °F (36.8 °C)   SpO2:  93%  92%   Weight:    166 lb 11.2 oz (75.6 kg)   Height:         Intake and Output:   06/25 0701 - 06/26 1900  In: 8339 [P.O.:1320; I.V.:425]  Out: 3624        Physical Exam:   Constitution:  the patient is well developed and in no acute distress, NC 2L  EENMT:  Sclera clear, pupils equal, oral mucosa moist  Respiratory:   Clear anterior and posterior, no wheezing  Cardiovascular:  RRR without M,G,R  Gastrointestinal: soft and non-tender; with positive bowel sounds. Musculoskeletal: warm without cyanosis. There is 1+ lower leg edema. Skin:  no jaundice or rashes, no wounds   Neurologic: no gross neuro deficits     Psychiatric:  alert and oriented x 3    CXR: None today      LAB  No results for input(s): GLUCPOC in the last 72 hours. No lab exists for component: [place]   Recent Labs      06/26/17 1955   WBC  10.0   HGB  10.1*   HCT  32.0*   PLT  234     Recent Labs      06/26/17 1955   NA  145   K  3.3*   CL  103   CO2  35*   GLU  113*   BUN  8   CREA  1.07   CA  8.7   ALB  2.9*   TBILI  0.3   ALT  26   SGOT  24     No results for input(s): PH, PCO2, PO2, HCO3 in the last 72 hours. No results for input(s): LCAD, LAC in the last 72 hours.       Assessment:  (Medical Decision Making)     Hospital Problems  Date Reviewed: 6/27/2017          Codes Class Noted POA    Edema ICD-10-CM: R60.9  ICD-9-CM: 782.3  6/25/2017 Unknown    diuresing    Schatzki's ring ICD-10-CM: K22.2  ICD-9-CM: 750.3  6/23/2017 Yes    unchanged    Bradycardia ICD-10-CM: R00.1  ICD-9-CM: 427.89  6/20/2017 Unknown    resolved    Severe protein-calorie malnutrition ([facility] [address] (Chronic) ICD-10-CM: E43  ICD-9-CM: 447  6/20/2017 Yes    chronic    Goodpasture syndrome ([facility] ICD-10-CM: M31.0  ICD-9-CM: 446.21  6/17/2017 Yes    Continue current    BPH (benign prostatic hyperplasia) (Chronic) ICD-10-CM: N40.0  ICD-9-CM: 600.00  6/16/2017 Yes    Chronic--voiding without problems    Anxiety and depression (Chronic) ICD-10-CM: F41.9, F32.9  ICD-9-CM: 300.00, 311  6/15/2017 Yes    chronic    History of hepatitis C (Chronic) ICD-10-CM: Z86.19  ICD-9-CM: V12.09  6/15/2017 Yes     S/p treatment         chronic    Chronic shoulder pain (Chronic) ICD-10-CM: M25.519, G89.29  ICD-9-CM: 719.41, 338.29  6/15/2017 Yes    chronic    HLD (hyperlipidemia) (Chronic) ICD-10-CM: E78.5  ICD-9-CM: 272.4  6/15/2017 Yes    chronic    * (Principal)Pulmonary cavitary lesion ICD-10-CM: J98.4  ICD-9-CM: 518.89  6/15/2017 Yes    continue current    Abnormal CXR ICD-10-CM: R93.8  ICD-9-CM: 793.2  6/15/2017 Yes    unchanged    Elevated C-reactive protein (CRP) ICD-10-CM: H77.00  ICD-9-CM: 790.95  6/15/2017 Yes    On admission 283          Plan:  (Medical Decision Making)     --IV  Lasix--urine output not recorded  --AN with desat. Will check ambulating sat prior to discharge--will likely need home O2  --Hematology now primary--continuing plasma exchange every other day. On Cytoxin  --Cessation of vaping stressed. More than 50% of the time documented was spent in face-to-face contact with the patient and in the care of the patient on the floor/unit where the patient is located. Simba Godwin NP    I have spoken with and examined the patient. I agree with the above assessment and plan as documented. Now on RA eating lunch and is comfortable. Lungs: CTA anteriorly presently  Heart:  RRR with no Murmur/Rubs/Gallops  Ext: no edema    Assess O2 needs formally with ambulation. Continue cytoxan/plasmapheresis per hematology/oncology  And will f/u imaging for improvement.     Rocky Magaña MD

## 2017-06-28 LAB
ALBUMIN SERPL BCP-MCNC: 3.2 G/DL (ref 3.5–5)
ALBUMIN/GLOB SERPL: 1 {RATIO} (ref 1.2–3.5)
ALP SERPL-CCNC: 85 U/L (ref 50–136)
ALT SERPL-CCNC: 23 U/L (ref 12–65)
ANION GAP BLD CALC-SCNC: 8 MMOL/L (ref 7–16)
APTT PPP: 34.8 SEC (ref 23.5–31.7)
AST SERPL W P-5'-P-CCNC: 20 U/L (ref 15–37)
BACTERIA SPEC CULT: NORMAL
BASOPHILS # BLD AUTO: 0 K/UL (ref 0–0.2)
BASOPHILS # BLD: 1 % (ref 0–2)
BILIRUB SERPL-MCNC: 0.3 MG/DL (ref 0.2–1.1)
BUN SERPL-MCNC: 11 MG/DL (ref 6–23)
CALCIUM SERPL-MCNC: 9 MG/DL (ref 8.3–10.4)
CHLORIDE SERPL-SCNC: 103 MMOL/L (ref 98–107)
CMV SPEC QL CULT: NORMAL
CO2 SERPL-SCNC: 34 MMOL/L (ref 21–32)
CREAT SERPL-MCNC: 0.97 MG/DL (ref 0.8–1.5)
DIFFERENTIAL METHOD BLD: ABNORMAL
EOSINOPHIL # BLD: 0.3 K/UL (ref 0–0.8)
EOSINOPHIL NFR BLD: 5 % (ref 0.5–7.8)
ERYTHROCYTE [DISTWIDTH] IN BLOOD BY AUTOMATED COUNT: 20.8 % (ref 11.9–14.6)
EV RNA # SPEC NAA+PROBE: NORMAL COPIES/ML
FIBRINOGEN PPP-MCNC: 306 MG/DL (ref 172–437)
GBM IGG SER-ACNC: 29 UNITS (ref 0–20)
GLOBULIN SER CALC-MCNC: 3.3 G/DL (ref 2.3–3.5)
GLUCOSE SERPL-MCNC: 91 MG/DL (ref 65–100)
HCT VFR BLD AUTO: 32.9 % (ref 41.1–50.3)
HGB BLD-MCNC: 10.4 G/DL (ref 13.6–17.2)
IMM GRANULOCYTES # BLD: 0 K/UL (ref 0–0.5)
IMM GRANULOCYTES NFR BLD AUTO: 0.6 % (ref 0–5)
LYMPHOCYTES # BLD AUTO: 33 % (ref 13–44)
LYMPHOCYTES # BLD: 2.1 K/UL (ref 0.5–4.6)
MAGNESIUM SERPL-MCNC: 1.9 MG/DL (ref 1.8–2.4)
MCH RBC QN AUTO: 26.3 PG (ref 26.1–32.9)
MCHC RBC AUTO-ENTMCNC: 31.6 G/DL (ref 31.4–35)
MCV RBC AUTO: 83.3 FL (ref 79.6–97.8)
MONOCYTES # BLD: 0.5 K/UL (ref 0.1–1.3)
MONOCYTES NFR BLD AUTO: 8 % (ref 4–12)
NEUTS SEG # BLD: 3.4 K/UL (ref 1.7–8.2)
NEUTS SEG NFR BLD AUTO: 52 % (ref 43–78)
PLATELET # BLD AUTO: 224 K/UL (ref 150–450)
PMV BLD AUTO: 8.9 FL (ref 10.8–14.1)
POTASSIUM SERPL-SCNC: 3.6 MMOL/L (ref 3.5–5.1)
PROT SERPL-MCNC: 6.5 G/DL (ref 6.3–8.2)
RBC # BLD AUTO: 3.95 M/UL (ref 4.23–5.67)
SERVICE CMNT-IMP: NORMAL
SODIUM SERPL-SCNC: 145 MMOL/L (ref 136–145)
SPECIMEN SOURCE: NORMAL
WBC # BLD AUTO: 6.3 K/UL (ref 4.3–11.1)

## 2017-06-28 PROCEDURE — 74011250637 HC RX REV CODE- 250/637: Performed by: NURSE PRACTITIONER

## 2017-06-28 PROCEDURE — 36514 APHERESIS PLASMA: CPT

## 2017-06-28 PROCEDURE — P9045 ALBUMIN (HUMAN), 5%, 250 ML: HCPCS | Performed by: NURSE PRACTITIONER

## 2017-06-28 PROCEDURE — 94761 N-INVAS EAR/PLS OXIMETRY MLT: CPT

## 2017-06-28 PROCEDURE — 74011250636 HC RX REV CODE- 250/636: Performed by: INTERNAL MEDICINE

## 2017-06-28 PROCEDURE — 80053 COMPREHEN METABOLIC PANEL: CPT | Performed by: NURSE PRACTITIONER

## 2017-06-28 PROCEDURE — 74011250637 HC RX REV CODE- 250/637: Performed by: PHYSICIAN ASSISTANT

## 2017-06-28 PROCEDURE — 85730 THROMBOPLASTIN TIME PARTIAL: CPT | Performed by: INTERNAL MEDICINE

## 2017-06-28 PROCEDURE — 99232 SBSQ HOSP IP/OBS MODERATE 35: CPT | Performed by: INTERNAL MEDICINE

## 2017-06-28 PROCEDURE — 74011250637 HC RX REV CODE- 250/637: Performed by: INTERNAL MEDICINE

## 2017-06-28 PROCEDURE — 83735 ASSAY OF MAGNESIUM: CPT | Performed by: NURSE PRACTITIONER

## 2017-06-28 PROCEDURE — 85025 COMPLETE CBC W/AUTO DIFF WBC: CPT | Performed by: NURSE PRACTITIONER

## 2017-06-28 PROCEDURE — 85384 FIBRINOGEN ACTIVITY: CPT | Performed by: INTERNAL MEDICINE

## 2017-06-28 PROCEDURE — 74011250636 HC RX REV CODE- 250/636: Performed by: NURSE PRACTITIONER

## 2017-06-28 PROCEDURE — 65270000029 HC RM PRIVATE

## 2017-06-28 PROCEDURE — 94760 N-INVAS EAR/PLS OXIMETRY 1: CPT

## 2017-06-28 RX ORDER — POTASSIUM CHLORIDE 20 MEQ/1
40 TABLET, EXTENDED RELEASE ORAL
Status: COMPLETED | OUTPATIENT
Start: 2017-06-28 | End: 2017-06-28

## 2017-06-28 RX ORDER — GLYCERIN ADULT
1 SUPPOSITORY, RECTAL RECTAL
Status: COMPLETED | OUTPATIENT
Start: 2017-06-28 | End: 2017-06-29

## 2017-06-28 RX ORDER — DICYCLOMINE HYDROCHLORIDE 10 MG/1
10 CAPSULE ORAL 4 TIMES DAILY
Status: DISCONTINUED | OUTPATIENT
Start: 2017-06-28 | End: 2017-06-28

## 2017-06-28 RX ORDER — POLYETHYLENE GLYCOL 3350 17 G/17G
17 POWDER, FOR SOLUTION ORAL DAILY
Status: DISCONTINUED | OUTPATIENT
Start: 2017-06-28 | End: 2017-06-29 | Stop reason: HOSPADM

## 2017-06-28 RX ORDER — ALBUMIN HUMAN 50 G/1000ML
200 SOLUTION INTRAVENOUS ONCE
Status: COMPLETED | OUTPATIENT
Start: 2017-06-28 | End: 2017-06-28

## 2017-06-28 RX ADMIN — DESVENLAFAXINE 100 MG: 100 TABLET, EXTENDED RELEASE ORAL at 17:59

## 2017-06-28 RX ADMIN — DEXTROAMPHETAMINE SACCHARATE, AMPHETAMINE ASPARTATE, DEXTROAMPHETAMINE SULFATE AND AMPHETAMINE SULFATE 30 MG: 3.75; 3.75; 3.75; 3.75 TABLET ORAL at 17:59

## 2017-06-28 RX ADMIN — SODIUM CHLORIDE 1000 ML: 900 INJECTION, SOLUTION INTRAVENOUS at 11:23

## 2017-06-28 RX ADMIN — LAMOTRIGINE 100 MG: 100 TABLET ORAL at 07:46

## 2017-06-28 RX ADMIN — ALBUMIN (HUMAN) 200 G: 12.5 INJECTION, SOLUTION INTRAVENOUS at 17:13

## 2017-06-28 RX ADMIN — DEXTROAMPHETAMINE SACCHARATE, AMPHETAMINE ASPARTATE, DEXTROAMPHETAMINE SULFATE AND AMPHETAMINE SULFATE 30 MG: 3.75; 3.75; 3.75; 3.75 TABLET ORAL at 07:45

## 2017-06-28 RX ADMIN — GABAPENTIN 1200 MG: 400 CAPSULE ORAL at 17:59

## 2017-06-28 RX ADMIN — BACLOFEN 20 MG: 10 TABLET ORAL at 17:59

## 2017-06-28 RX ADMIN — DESVENLAFAXINE 100 MG: 100 TABLET, EXTENDED RELEASE ORAL at 07:56

## 2017-06-28 RX ADMIN — Medication 10 ML: at 18:00

## 2017-06-28 RX ADMIN — GABAPENTIN 1200 MG: 400 CAPSULE ORAL at 22:12

## 2017-06-28 RX ADMIN — OXYCODONE HYDROCHLORIDE 30 MG: 15 TABLET ORAL at 22:12

## 2017-06-28 RX ADMIN — Medication 5 ML: at 06:26

## 2017-06-28 RX ADMIN — CYCLOPHOSPHAMIDE 150 MG: 50 CAPSULE ORAL at 22:12

## 2017-06-28 RX ADMIN — BACLOFEN 20 MG: 10 TABLET ORAL at 22:12

## 2017-06-28 RX ADMIN — GABAPENTIN 1200 MG: 400 CAPSULE ORAL at 07:45

## 2017-06-28 RX ADMIN — Medication 10 ML: at 18:26

## 2017-06-28 RX ADMIN — Medication 5 ML: at 01:12

## 2017-06-28 RX ADMIN — DOXAZOSIN 4 MG: 4 TABLET ORAL at 07:46

## 2017-06-28 RX ADMIN — DEXTROAMPHETAMINE SACCHARATE, AMPHETAMINE ASPARTATE, DEXTROAMPHETAMINE SULFATE AND AMPHETAMINE SULFATE 30 MG: 3.75; 3.75; 3.75; 3.75 TABLET ORAL at 12:33

## 2017-06-28 RX ADMIN — POLYETHYLENE GLYCOL 3350 17 G: 17 POWDER, FOR SOLUTION ORAL at 12:33

## 2017-06-28 RX ADMIN — SODIUM CHLORIDE 500 ML: 900 INJECTION, SOLUTION INTRAVENOUS at 17:30

## 2017-06-28 RX ADMIN — DICYCLOMINE HYDROCHLORIDE 10 MG: 10 CAPSULE ORAL at 12:33

## 2017-06-28 RX ADMIN — MIRTAZAPINE 30 MG: 15 TABLET, FILM COATED ORAL at 22:12

## 2017-06-28 RX ADMIN — OXYCODONE HYDROCHLORIDE 30 MG: 15 TABLET ORAL at 01:12

## 2017-06-28 RX ADMIN — B-COMPLEX W/ C & FOLIC ACID TAB 1 TABLET: TAB at 07:46

## 2017-06-28 RX ADMIN — DIAZEPAM 10 MG: 5 TABLET ORAL at 06:30

## 2017-06-28 RX ADMIN — FUROSEMIDE 40 MG: 10 INJECTION, SOLUTION INTRAMUSCULAR; INTRAVENOUS at 07:45

## 2017-06-28 RX ADMIN — OXYCODONE HYDROCHLORIDE 30 MG: 15 TABLET ORAL at 07:46

## 2017-06-28 RX ADMIN — DIAZEPAM 10 MG: 5 TABLET ORAL at 22:12

## 2017-06-28 RX ADMIN — BACLOFEN 20 MG: 10 TABLET ORAL at 07:46

## 2017-06-28 RX ADMIN — Medication 5 ML: at 22:00

## 2017-06-28 RX ADMIN — POTASSIUM CHLORIDE 40 MEQ: 20 TABLET, EXTENDED RELEASE ORAL at 07:46

## 2017-06-28 RX ADMIN — CALCIUM GLUCONATE: 94 INJECTION, SOLUTION INTRAVENOUS at 17:13

## 2017-06-28 RX ADMIN — PANTOPRAZOLE SODIUM 40 MG: 40 TABLET, DELAYED RELEASE ORAL at 06:28

## 2017-06-28 NOTE — PROGRESS NOTES
Lucas Kayser Adcox  Admission Date: 6/15/2017             Daily Progress Note: 6/28/2017    The patient's chart is reviewed and the patient is discussed with the staff. 62 yo male presents with chills, shortness of breath, and edema. CT chest 4/21/17 revealed RLL 4.4 cm x 3.1cm density. Had chest CT 5/8/17 with multiple large infiltrative nodules in R lung concerning for cavitation. Was seen 6/8 by Dr. Brittni Currie and started on Levaquin on 6/9. Was not any better, was hypoxic on RA, and was referred for admission / ongoing workup of cavitary lesion. History of smoking <1 ppd x 10 years off and on, quit in 2014. CRP elevated at 283. Anti GBM Ab +- initiated on Cyclophosphamide and pulse dose steroid 06/17/17. Nephrology consulted with no plans for renal bx (normal renal function / no blood or microscopic protein in urine). Also seen by heme/Onc with plans for plasma pheresis.  Chronic depression, anxiety, ADD,Hepatitis C, BPH, and chronic pain. Has been feeling poorly, lost 40lbs over 3 months secondary to anorexia.  Weaned to room air but using O2 prn. Undergoing plasmapheresis per hematology. AN with desat 1hr. Subjective: Weaned to room air. Sleeping, easily arouse and denies shortness of breath. For plasma exchange today.     Current Facility-Administered Medications   Medication Dose Route Frequency    furosemide (LASIX) injection 40 mg  40 mg IntraVENous BID    0.9% sodium chloride infusion 250 mL  250 mL IntraVENous PRN    0.9% sodium chloride infusion 1,000 mL  1,000 mL IntraVENous PRN    heparin (PF) 2 units/ml in NS infusion 2,000 Units  1,000 mL Irrigation Multiple    sodium chloride (NS) flush 5-10 mL  5-10 mL IntraVENous Q8H    sodium chloride (NS) flush 5-10 mL  5-10 mL IntraVENous PRN    lidocaine (XYLOCAINE) 2 % jelly   Mucous Membrane PRN    acetaminophen (TYLENOL) tablet 650 mg  650 mg Oral Q4H PRN    diphenhydrAMINE (BENADRYL) capsule 25 mg  25 mg Oral Q6H PRN  cyclophosphamide (CYTOXAN) chemo capsule 150 mg  150 mg Oral Q24H    dextroamphetamine-amphetamine (ADDERALL) tablet 30 mg  30 mg Oral TID    baclofen (LIORESAL) tablet 20 mg  20 mg Oral TID    DESVENLAFAXINE SUCCINATE (PRISTIQ PO) - patient supplied  100 mg Oral BID    doxazosin (CARDURA) tablet 4 mg  4 mg Oral DAILY    gabapentin (NEURONTIN) capsule 1,200 mg  1,200 mg Oral TID    lamoTRIgine (LaMICtal) tablet 100 mg  100 mg Oral DAILY    mirtazapine (REMERON) tablet 30 mg  30 mg Oral QHS    multivitamin, stress formula (STRESS TAB) tablet 1 Tab  1 Tab Oral DAILY    pantoprazole (PROTONIX) tablet 40 mg  40 mg Oral ACB    oxyCODONE IR (OXY-IR) immediate release tablet 30 mg  30 mg Oral Q4H PRN    diazePAM (VALIUM) tablet 10 mg  10 mg Oral Q4H PRN    sodium chloride (NS) flush 5-10 mL  5-10 mL IntraVENous Q8H    sodium chloride (NS) flush 5-10 mL  5-10 mL IntraVENous PRN    albuterol (PROVENTIL VENTOLIN) nebulizer solution 2.5 mg  2.5 mg Nebulization Q6H PRN    acetaminophen (TYLENOL) tablet 650 mg  650 mg Oral Q4H PRN    naloxone (NARCAN) injection 0.4 mg  0.4 mg IntraVENous PRN    ondansetron (ZOFRAN) injection 4 mg  4 mg IntraVENous Q4H PRN    senna-docusate (PERICOLACE) 8.6-50 mg per tablet 1 Tab  1 Tab Oral BID PRN       Review of Systems  Constitutional: negative for fever, chills, sweats  Cardiovascular: trace LE edema, negative for chest pain, palpitations, syncope  Gastrointestinal:  negative for dysphagia, reflux, vomiting, diarrhea, abdominal pain, or melena  Neurologic:  negative for focal weakness, numbness, headache    Objective:     Vitals:    06/27/17 1910 06/27/17 2310 06/28/17 0345 06/28/17 0520   BP: 105/63 94/53 106/62    Pulse: 72 70 72    Resp: 18 17 18    Temp: 98.3 °F (36.8 °C) 97.7 °F (36.5 °C) 98.1 °F (36.7 °C)    SpO2: 90% 90% 92%    Weight:    199 lb 11.2 oz (90.6 kg)   Height:         Intake and Output:   06/26 0701 - 06/27 1900  In: 0789 [P.O.:1200;  I.V.:425]  Out: 2615        Physical Exam:   Constitution:  the patient is well developed and in no acute distress, room air, sat 92%  EENMT:  Sclera clear, pupils equal, oral mucosa moist  Respiratory:   Clear anterior and posterior, no wheezing  Cardiovascular:  RRR without M,G,R  Gastrointestinal: soft and non-tender; with positive bowel sounds. Musculoskeletal: warm without cyanosis. There is trace lower leg edema. Skin:  no jaundice or rashes, no wounds   Neurologic: no gross neuro deficits     Psychiatric:  alert and oriented x 3    CXR: None today      LAB  No results for input(s): GLUCPOC in the last 72 hours. No lab exists for component: [place]   Recent Labs      06/26/17 1955   WBC  10.0   HGB  10.1*   HCT  32.0*   PLT  234     Recent Labs      06/26/17 1955   NA  145   K  3.3*   CL  103   CO2  35*   GLU  113*   BUN  8   CREA  1.07   CA  8.7   ALB  2.9*   TBILI  0.3   ALT  26   SGOT  24     No results for input(s): PH, PCO2, PO2, HCO3 in the last 72 hours. No results for input(s): LCAD, LAC in the last 72 hours.       Assessment:  (Medical Decision Making)     Hospital Problems  Date Reviewed: 6/28/2017          Codes Class Noted POA    Edema ICD-10-CM: R60.9  ICD-9-CM: 782.3  6/25/2017 Unknown    diuresing    Schatzki's ring ICD-10-CM: K22.2  ICD-9-CM: 750.3  6/23/2017 Yes    unchanged    Bradycardia ICD-10-CM: R00.1  ICD-9-CM: 427.89  6/20/2017 Unknown    resolved    Severe protein-calorie malnutrition ([address] (Chronic) ICD-10-CM: E43  ICD-9-CM: 262  6/20/2017 Yes    chronic    Goodpasture syndrome ([address] ICD-10-CM: M31.0  ICD-9-CM: 446.21  6/17/2017 Yes    Continue current    BPH (benign prostatic hyperplasia) (Chronic) ICD-10-CM: N40.0  ICD-9-CM: 600.00  6/16/2017 Yes    Chronic--voiding without problems    Anxiety and depression (Chronic) ICD-10-CM: F41.9, F32.9  ICD-9-CM: 300.00, 311  6/15/2017 Yes    chronic    History of hepatitis C (Chronic) ICD-10-CM: Z86.19  ICD-9-CM: V12.09  6/15/2017 Yes     S/p treatment         chronic    Chronic shoulder pain (Chronic) ICD-10-CM: M25.519, G89.29  ICD-9-CM: 719.41, 338.29  6/15/2017 Yes    chronic    HLD (hyperlipidemia) (Chronic) ICD-10-CM: E78.5  ICD-9-CM: 272.4  6/15/2017 Yes    chronic    * (Principal)Pulmonary cavitary lesion ICD-10-CM: J98.4  ICD-9-CM: 518.89  6/15/2017 Yes    continue current    Abnormal CXR ICD-10-CM: R93.8  ICD-9-CM: 793.2  6/15/2017 Yes    unchanged    Elevated C-reactive protein (CRP) ICD-10-CM: X57.34  ICD-9-CM: 790.95  6/15/2017 Yes    On admission 283          Plan:  (Medical Decision Making)     --IV  Lasix--urine output not recorded  --AN with desat. Check ambulating sat prior to discharge--will likely need home O2  --Hematology now primary--continuing plasma exchange due today. On Cytoxin  --Recent K+ 3.3--supplement     More than 50% of the time documented was spent in face-to-face contact with the patient and in the care of the patient on the floor/unit where the patient is located. Bekah Pagan NP    Lungs: CTA b/l. NO wheezing. Heart S1 and S2 audible, no murmers or rubs appreciated  Other     complaining of chronic abdominal pain. Has been an issue for over a year. Has seen Dr. Elizabeth Cardoza and was to go back for an appointment but in the hospital. He is wondering if she can f/u here. ON examination diffusely sore, but no guarding or rebound. + BS. Will engage GI for recommendations. He does not report reflux. cxr tomorrow and will get AP and lat to check for infiltrates. May eventually need another CT in the future since a lot of cavitary & infiltrates were noted plural and apical.       I have spoken with and examined the patient. I have reviewed the history, examination, assessment, and plan and agree with the above. Eusebio Godwin MD      This note was signed electronically.

## 2017-06-28 NOTE — PROGRESS NOTES
Mr. Keen sitting on edge of bed eating breakfast tray. Alert, oriented in all fields. Lungs diminished in all fields; remains on 2 lpm NC without dyspnea or cough. States generalized pain 9/10; Oxycodone PO given at this time. Trace edema noted to b/l legs. Patient without further complaints.  Will monitor with call light in lap and door closed per request.

## 2017-06-28 NOTE — CONSULTS
[facility] Re-Consult Note       Primary GI Physician: Dr Aristides Osborne    Referring Physician:  Dr Malcolm Corona Date:  6/28/2017    Admit Date:  6/15/2017    Chief Complaint:  Chronic ABD Pain    Subjective:     History of Present Illness:  Patient is a 61 y.o. male with PMH of depression, cirrhosis, anxiety, ADD, Hep C (s/p Pegasys tx), BPH and chronic pain, who is seen in consultation at the request of Dr. Livan Jiménez for chronic abd pain. Patient reports diffuse abd pain worse on right side for over 1 year. This is worse with constipation. He has a hx of chronic constipation due to narcotic use. Miralax was recommneded at his last office visit on 6/2/17 but he has not tried this daily. He reports a normal BM last pm.  He denies rectal bleed. He has lost 40 pounds over the past 6 months. He underwent colonoscopy on 9/4/14 for screening a a history of colon polys with fair prep and diverticuloses,. A 3 year colonoscopy surveillance was recommended with a 2 day clear, MOM and Miralax prep. He has a hx of esophageal spasm and dysphagia. He was seen in consult during this hospitalization on 6/21 for dysphagia and underwent EGD 6/22 with mild ring s/p dilation. This improved dysphagia per patient. He underwent CT of the A/P for [address] screening on 5/8/17 with cirrhotic changes in the liver, liver cyst, and abnormal lung lesions for which he was referred to pulmonology. He was admitted with chills, shortness of breath, and edema on 6/15. He had abd CT 4/21/17 which revealed RLL 4.4 cm x 3.1cm density. He had chest CT 5/8/17 which revealed multiple large infiltrative nodules in R lung concerning for cavitation. Pt was admitted by pulmonary team and started on O2 and abx. 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 Pt had bronchoscopy with airway inspection and cleanout on 6/20 as well.  Speech saw him and stated he was not a risk for aspiration and suggested thin liquids and soft diet.       EGD 6/22/16 Dr Sendy Cordero [facility]  Esophagus- Mild Schatzki ring. Dilated with 15-18 mm balloon. No trauma. Esophagus had lots of thick secretions in it. Stomach- Normal.  Duodenum- Normal.  Impression:    Mild Schatzki ring. Dilated with 15-18 mm balloon. Esophagus had lots of thick secretions in it. Recommendations:  Repeat dilation prn. PMH:  Past Medical History:   Diagnosis Date    Anxiety and depression 8/7/2012    BPH (benign prostatic hyperplasia) 8/7/2012    Chronic shoulder pain 8/7/2012    Colon polyps     Hepatitis C     diagnosed 10/08    History of hepatitis C 8/7/2012    History of hepatitis C 8/7/2012    HLD (hyperlipidemia) 8/7/2012    Other ill-defined conditions     had abscess on lung; liver problems    Psychiatric disorder     depression; anxiety       PSH:  Past Surgical History:   Procedure Laterality Date    HX ORTHOPAEDIC      shoulders x4    HX OTHER SURGICAL      abscess removed from bilateral lungs       Allergies:  No Known Allergies    Home Medications:  Prior to Admission medications    Medication Sig Start Date End Date Taking? Authorizing Provider   oxyCODONE IR (OXY-IR) 30 mg immediate release tablet Take 1 Tab by mouth every four (4) hours as needed for Pain. 7/8/17  Yes Dee Dee Kim MD   lamoTRIgine (LAMICTAL) 100 mg tablet TK 1 T PO Q DAY 11/13/16  Yes Historical Provider   baclofen (LIORESAL) 20 mg tablet Take 1 Tab by mouth three (3) times daily. 12/1/16  Yes Dee Dee Ngo MD   gabapentin (NEURONTIN) 600 mg tablet Take 2 Tabs by mouth three (3) times daily. 12/1/16  Yes Dee Dee Ngo MD   amphetamine-dextroamphetamine (ADDERALL) 30 mg tablet Take 30 mg by mouth three (3) times daily. Yes Historical Provider   DESVENLAFAXINE SUCCINATE (PRISTIQ PO) Take 100 mg by mouth daily.    Yes Destini Altamirano MD   VALIUM 10 mg Tab Take 10 mg by mouth every four (4) hours as needed. Yes Historical Provider   levoFLOXacin (LEVAQUIN) 750 mg tablet Take 1 Tab by mouth daily. 6/9/17 April Earnestine Litten, MD   doxazosin (CARDURA) 4 mg tablet Take 1 Tab by mouth daily. Indications: SYMPTOMATIC BENIGN PROSTATIC HYPERPLASIA 6/8/17 April LEEANN Kim MD   omeprazole (PRILOSEC) 40 mg capsule Take 1 Cap by mouth daily. 3/1/17   April Earnestine Litten, MD   mirtazapine (REMERON) 30 mg tablet 2 tabs po qhs    Historical Provider   multivitamin, stress formula (STRESS TAB) tablet Take 1 Tab by mouth daily.     Historical Provider       Hospital Medications:  Current Facility-Administered Medications   Medication Dose Route Frequency    furosemide (LASIX) injection 40 mg  40 mg IntraVENous BID    0.9% sodium chloride infusion 250 mL  250 mL IntraVENous PRN    0.9% sodium chloride infusion 1,000 mL  1,000 mL IntraVENous PRN    heparin (PF) 2 units/ml in NS infusion 2,000 Units  1,000 mL Irrigation Multiple    sodium chloride (NS) flush 5-10 mL  5-10 mL IntraVENous Q8H    sodium chloride (NS) flush 5-10 mL  5-10 mL IntraVENous PRN    lidocaine (XYLOCAINE) 2 % jelly   Mucous Membrane PRN    acetaminophen (TYLENOL) tablet 650 mg  650 mg Oral Q4H PRN    diphenhydrAMINE (BENADRYL) capsule 25 mg  25 mg Oral Q6H PRN    cyclophosphamide (CYTOXAN) chemo capsule 150 mg  150 mg Oral Q24H    dextroamphetamine-amphetamine (ADDERALL) tablet 30 mg  30 mg Oral TID    baclofen (LIORESAL) tablet 20 mg  20 mg Oral TID    DESVENLAFAXINE SUCCINATE (PRISTIQ PO) - patient supplied  100 mg Oral BID    doxazosin (CARDURA) tablet 4 mg  4 mg Oral DAILY    gabapentin (NEURONTIN) capsule 1,200 mg  1,200 mg Oral TID    lamoTRIgine (LaMICtal) tablet 100 mg  100 mg Oral DAILY    mirtazapine (REMERON) tablet 30 mg  30 mg Oral QHS    multivitamin, stress formula (STRESS TAB) tablet 1 Tab  1 Tab Oral DAILY    pantoprazole (PROTONIX) tablet 40 mg  40 mg Oral ACB    oxyCODONE IR (OXY-IR) immediate release tablet 30 mg  30 mg Oral Q4H PRN    diazePAM (VALIUM) tablet 10 mg  10 mg Oral Q4H PRN    sodium chloride (NS) flush 5-10 mL  5-10 mL IntraVENous Q8H    sodium chloride (NS) flush 5-10 mL  5-10 mL IntraVENous PRN    albuterol (PROVENTIL VENTOLIN) nebulizer solution 2.5 mg  2.5 mg Nebulization Q6H PRN    acetaminophen (TYLENOL) tablet 650 mg  650 mg Oral Q4H PRN    naloxone (NARCAN) injection 0.4 mg  0.4 mg IntraVENous PRN    ondansetron (ZOFRAN) injection 4 mg  4 mg IntraVENous Q4H PRN    senna-docusate (PERICOLACE) 8.6-50 mg per tablet 1 Tab  1 Tab Oral BID PRN       Social History:  Social History   Substance Use Topics    Smoking status: Former Smoker     Packs/day: 0.50     Years: 6.00     Quit date: 12/1/2014    Smokeless tobacco: Never Used      Comment: Quit 09/09 > 10pk Hx    Alcohol use No       Pt denies any history of drug use, blood transfusions, or tattoos. Family History:  Family History   Problem Relation Age of Onset    Lung Disease Father     Cancer Father      lung       Review of Systems:  A detailed 10 system ROS is obtained, with pertinent positives as listed above. All others are negative. Diet:  Regular    Objective:     Physical Exam:  Vitals:  Visit Vitals    BP 95/45 (BP 1 Location: Right arm, BP Patient Position: At rest)    Pulse 61    Temp 97.9 °F (36.6 °C)    Resp 18    Ht 5' 7\" (1.702 m)    Wt 90.6 kg (199 lb 11.2 oz)    SpO2 91%    BMI 31.28 kg/m2     Gen:  Pt is alert, cooperative, no acute distress  Skin:  Extremities and face reveal no rashes. HEENT: Sclerae anicteric. Extra-occular muscles are intact. No oral ulcers. No abnormal pigmentation of the lips. The neck is supple. Cardiovascular: Regular rate and rhythm. No murmurs, gallops, or rubs. Respiratory:  Course breath sounds anterior and posterior  GI:  Abdomen nondistended, soft, and nontender. Normal active bowel sounds. No enlargement of the liver or spleen. No masses palpable.   Rectal: Deferred  Musculoskeletal:  No pitting edema of the lower legs. Neurological:  Gross memory appears intact. Patient is alert and oriented. Psychiatric:  Mood appears appropriate with judgement intact. Lymphatic:  No cervical or supraclavicular adenopathy. Laboratory:    Recent Labs      06/26/17 1955   WBC  10.0   HGB  10.1*   HCT  32.0*   PLT  234   MCV  82.7   NA  145   K  3.3*   CL  103   CO2  35*   BUN  8   CREA  1.07   CA  8.7   GLU  113*   AP  74   SGOT  24   ALT  26   TBILI  0.3   ALB  2.9*   TP  6.1*          Assessment:     Principal Problem:    Pulmonary cavitary lesion (6/15/2017)    Active Problems:    Anxiety and depression (6/15/2017)      History of hepatitis C (6/15/2017)      Overview: S/p treatment      Chronic shoulder pain (6/15/2017)      HLD (hyperlipidemia) (6/15/2017)      BPH (benign prostatic hyperplasia) (6/16/2017)      Abnormal CXR (6/15/2017)      Elevated C-reactive protein (CRP) (6/15/2017)      Goodpasture syndrome ([address] (6/17/2017)      Severe protein-calorie malnutrition ([address] (6/20/2017)      Schatzki's ring (6/23/2017)      Edema (6/25/2017)      61 y.o. male with PMH of depression, cirrhosis, anxiety, ADD, Hep C (s/p Pegasys tx), BPH and chronic pain admitted with chills, SOB, edema and lung lesions. Patient reports diffuse abd pain worse on right side for over 1 year. This is worse with constipation. He has a hx of chronic constipation due to narcotic use. Miralax was recommneded at his last office visit on 6/2/17 but he has not tried this. He denies rectal bleed. He has lost 40 pounds over the past 6 months. He underwent colonoscopy on 9/4/14 for screening a a history of colon polys with fair prep and diverticuloses,. A 3 year colonoscopy surveillance was recommended with a 2 day clear, MOM and Miralax prep. He has a hx of esophageal spasm and dysphagia.   He was seen in consult during this hospitalization on 6/21 for dysphagia and underwent EGD 6/22 with mild ring s/p dilation. This improved dysphagia per patient. He is on every other day plasmapheresis for Goodpasture ds. Plan:     1) Begin bowel regimen daily, Miralax  2) Trial of Bentyl 10 mg qid  3) He may benefit from out patient trial of Linzess vs Amitiza  4) Due for screening colonoscopy 9/2017 - would delay until pulmonary status is optimized     Nghia YUDELKA Olivarez  Patient is seen and examined in collaboration with Dr. Dat Hernandez. Assessment and plan as per Dr. Abdullahi Wright.

## 2017-06-28 NOTE — PROGRESS NOTES
Mr. Ruby Lopez resting in bed receiving apheresis. Uneventful day. Without complaints or needs at this time. Call light in lap and door open. Continuous RN at bedside during exchange. Will continue to monitor.

## 2017-06-28 NOTE — PROGRESS NOTES
Pt resting quietly in bed on RA, responds to voice, requesting pain medication when next available. No s/s of acute distress noted. Report given to oncoming RN.

## 2017-06-28 NOTE — PROGRESS NOTES
BP 72/48, pulse 75. Patient asymptomatic. Asking for Valium. Orders received for 1 liter fluid bolus. See MAR. Will monitor and recheck.

## 2017-06-28 NOTE — PROGRESS NOTES
Pt entertained visitors until [phone]. Pt now found splayed out on back, eyes closed, sideways in bed, e-vape in bed by hand. Easy to arouse but drowsy with slurred speech. VSS. Will monitor.

## 2017-06-28 NOTE — PROGRESS NOTES
Procedure: TPE  Dx: Alfie Crespo pastsaira syndrome  Day: 7   Labs drawn: see connect salma  Fibrinogen: 306  Calcium used: 4 grams  Replacement product: Albumin  Replace volume: 3702  /  Remove volume: 3947  TBV processed:100%  Other parameters: TPV 1.2  Issues: bp was low 500 ml bolus given  Next procedure date: 6/30      Report given to [facility] primary RN

## 2017-06-28 NOTE — PROGRESS NOTES
Pt O2 SAT checked on room air at rest HR-72/ SAT-86%. Pt placed on 1L at rest HR-72/ SAT-89%  Pt placed on 2L and SAT checked at rest HR-70/ SAT-92%. Pt ambulated 6+ Min. On 2L HR-84/ SAT-93%.   Pt back to his room on 2L at rest HR-80/ SAT-94%

## 2017-06-28 NOTE — PROGRESS NOTES
Inpatient Hematology / Oncology Progress Note      Admission Date: 6/15/2017 11:50 AM  Reason for Admission/Hospital Course: Lung mass [R91.8]  Dysphagia, unspecified [R13.10]  Esophageal stricture [K22.2]    24 Hour Events:  Afebrile and vitals stable  PLEX today  Hypotensive episode down to 72/48, HR 75, asymptomatic - giving 1L fluid bolus    ROS:  Constitutional: +fatigue Negative for fever, chills, weakness, malaise. CV: Negative for chest pain, palpitations, edema. Respiratory: Negative for dyspnea, cough, wheezing. GI: Negative for nausea, abdominal pain, diarrhea. 10 point review of systems is otherwise negative with the exception of the elements mentioned above in the HPI. No Known Allergies    OBJECTIVE:  Patient Vitals for the past 8 hrs:   BP Temp Pulse Resp SpO2 Weight   17 1053 (!) 72/48 97.8 °F (36.6 °C) 75 18 92 % -   17 0716 95/45 97.9 °F (36.6 °C) 61 18 91 % -   17 0520 - - - - - 199 lb 11.2 oz (90.6 kg)     Temp (24hrs), Av.1 °F (36.7 °C), Min:97.7 °F (36.5 °C), Max:98.6 °F (37 °C)     0701 -  1900  In: 480 [P.O.:480]  Out: 1000 [Urine:1000]    Physical Exam:  Constitutional: Well developed, well nourished male in no acute distress, sitting  comfortably in the hospital bed. HEENT: Normocephalic and atraumatic. Oropharynx is clear, mucous membranes are moist. Sclerae anicteric. Neck supple    Skin Warm and dry. No bruising and no rash noted. No erythema. No pallor. Respiratory Lungs are clear to auscultation bilaterally without wheezes, rales or rhonchi, normal air exchange without accessory muscle use. CVS Normal rate, regular rhythm and normal S1 and S2. No murmurs, gallops, or rubs. Abdomen RUQ tenderness with palpation-he states is chronic. Soft and nondistended, normoactive bowel sounds. No palpable mass. Neuro Grossly nonfocal with no obvious sensory or motor deficits.    MSK Normal range of motion in general.  No edema and no tenderness. Psych Appropriate mood and affect. Right neck temp apheresis cath - dressing C/D/I    Labs:      Recent Labs      06/28/17 1120 06/26/17 1955   WBC  6.3  10.0   RBC  3.95*  3.87*   HGB  10.4*  10.1*   HCT  32.9*  32.0*   MCV  83.3  82.7   MCH  26.3  26.1   MCHC  31.6  31.6   RDW  20.8*  20.2*   PLT  224  234   GRANS  52  72   LYMPH  33  24   MONOS  8  4   EOS  5  0*   BASOS  1  0   IG  0.6  0.5   DF  AUTOMATED  AUTOMATED   ANEU  3.4  7.2   ABL  2.1  2.4   ABM  0.5  0.4   YEMI  0.3  0.0   ABB  0.0  0.0   AIG  0.0  0.1        Recent Labs      06/28/17 1120 06/26/17 1955   NA  145  145   K  3.6  3.3*   CL  103  103   CO2  34*  35*   AGAP  8  7   GLU  91  113*   BUN  11  8   CREA  0.97  1.07   GFRAA  >60  >60   GFRNA  >60  >60   CA  9.0  8.7   SGOT  20  24   AP  85  74   TP  6.5  6.1*   ALB  3.2*  2.9*   GLOB  3.3  3.2   AGRAT  1.0*  0.9*   MG  1.9   --          Imaging:  CT Chest 6/15/17  HISTORY: Lower extremities pain and swelling. Pulmonary infiltrates.     EXAM: Contiguous axial CT images were obtained from the thoracic inlet to the  upper abdomen following the uneventful IV administration of 100 mL Isovue-370. Coronal reformations were performed.     Radiation dose reduction techniques were used for this study. All CT scans  performed at this facility use one or all of the following: Automated exposure  control, adjustment of the mA and/or kVp according to patient's size, and  iterative reconstruction.     PRIOR STUDY: 05/08/2017     FINDINGS: There is no evidence of pulmonary embolus through the subsegmental  level bilaterally. The main pulmonary artery is normal in size. There are  multiple mildly prominent mediastinal lymph nodes with a representative right  paratracheal lymph node measuring 18 x 10 mm. There are nonenlarged hilar lymph  nodes bilaterally.  There is no pericardial effusion.     There has been progressive consolidation and volume loss in the right upper lobe  with areas of air bronchograms as well as multiple cavitary foci. The  consolidative and cavitary process in the right lower lobe has also mildly  enlarged, now measuring 5.6 x 2.5 cm compared with 4.2 x 2.5 cm previously. There is a new 6 mm irregular, nodular density (image 72) located just anterior  and inferior to this larger process. There are minor groundglass opacities in  the left lower lobe, new from the prior exam. There is a background of  centrilobular emphysema. There is no pleural effusion. Several tiny nodular  densities in the right lung base (images 93-96) have developed since the prior  exam.     There are subcentimeter hypodensities in the left hepatic lobe. Several  calcified gallstones are present in the gallbladder. The remaining included  upper abdominal organs are unremarkable. There are no acute or concerning bony  lesions.     IMPRESSION  IMPRESSION:      1. Progressive consolidation and volume loss in the right upper lobe with new  areas of cavitation. The consolidative cavitary process in the right lower lobe  has also mildly enlarged, and several new irregular and groundglass nodules have  developed bilaterally. Findings are suggestive of an infectious process,  possibly fungal.     2. No evidence of pulmonary embolus.     3. Mildly prominent mediastinal lymph nodes which are likely reactive.     4. Cholelithiasis.         ASSESSMENT:    Problem List  Date Reviewed: 6/28/2017          Codes Class Noted    Edema ICD-10-CM: R60.9  ICD-9-CM: 782.3  6/25/2017        Schatzki's ring ICD-10-CM: K22.2  ICD-9-CM: 750.3  6/23/2017        Severe protein-calorie malnutrition ([facility] (Chronic) ICD-10-CM: E43  ICD-9-CM: 262  6/20/2017        Goodpasture syndrome ([facility] ICD-10-CM: M31.0  ICD-9-CM: 446.21  6/17/2017        BPH (benign prostatic hyperplasia) (Chronic) ICD-10-CM: N40.0  ICD-9-CM: 600.00  6/16/2017        Anxiety and depression (Chronic) ICD-10-CM: F41.9, F32.9  ICD-9-CM: 300.00, 311  6/15/2017 History of hepatitis C (Chronic) ICD-10-CM: Z86.19  ICD-9-CM: V12.09  6/15/2017    Overview Signed 8/7/2012 10:55 AM by Jez Melvin III     S/p treatment             Chronic shoulder pain (Chronic) ICD-10-CM: M25.519, G89.29  ICD-9-CM: 719.41, 338.29  6/15/2017        HLD (hyperlipidemia) (Chronic) ICD-10-CM: E78.5  ICD-9-CM: 272.4  6/15/2017        * (Principal)Pulmonary cavitary lesion ICD-10-CM: J98.4  ICD-9-CM: 518.89  6/15/2017        Abnormal CXR ICD-10-CM: R93.8  ICD-9-CM: 793.2  6/15/2017        Elevated C-reactive protein (CRP) ICD-10-CM: R79.82  ICD-9-CM: 790.95  6/15/2017                PLAN:  Goodpasture syndrome   6/19: Mr Keen was informed of the apheresis process and purpose and is agreeable  IR was consulted today for temp cath placement. Once line is placed we will start plasmapheresis with the plan of daily for 5 days and then every other day. GBM antibody was 75 on admission. Per Pulmonary, he is scheduled for Bronch/EBUS tomorrow. He will continue on steroids and cytoxan.   6/21 S/p bronch yesterday and found white exudate false cords and diffuse inflammation, edema, erythema. BAL done and pending. Day 2 PLEX today. Plan to repeat levels in 1 week. Continue steroids/cytoxan  6/22 Clinically no changes. Day 3 PLEX today, will continue with steroids and cytoxan.   6/23 Day 4 PLEX today, continue steroids and cytoxan  6/24-Day 5 PLEX today-off tomorrow-resume Monday. Will have repeat quantitation of anti-GBM antibody on Monday. Continue steroids and Cytoxan. 6/26 - Plasma exchange decreased to every other day. Repeat anti GBM antibody today. Potentially ready for discharge. Discussed with pulmonary. Patient does not have transportation to the cancer center for pheresis treatments. Social work consulted. Hopefully home tomorrow with O2 at home PRN. We will gladly assume primary care of this patient  6/27 No exchange today.  Repeat tomorrow and should be able to go home with every other day PLEX treatments x 1 week and office follow ups with pulmonary and nephrology. Transportation seems to be an issue and seen by social work yesterday. Apparently today feels he could go after treatment tomorrow and find a ride   6/28 PLEX today, off tomorrow, resume Friday. Con't cytoxan daily. CXR today per pulm, may eventually need another CT in the future since a lot of cavitary and infiltrates were noted. Noted 33lbs wt gain from yesterday in chart - believe this value was erroneous. Check wt tomorrow. Hypotension  6/28 BP down to 72/48 today. Receiving lasix BID. Give 1L NS bolus. May need to reduce lasix given hypotension. Hold lasix for SBP <110. Chronic Abdominal Pain  6/28 Pulm consulted GI    Disposition:  Plan for discharge tomorrow. Will need appt with Dr. Ricky Martinez on Friday with PLEX. PLEX every other day x 1 week and office follow-ups with pulm, neph, and GI. Per pt, transportation may be an issue. However, I spoke with patient's son who said pt would have transportation home and for appts. Anibal Cowart, YUDELKA   [facility]  [address] [address]  Office : [phone]  Fax : [phone]   Patient seen, examed and discussed with NP, agree with above history/assessment/plan. 61 y. o.male new dx Goodpasture syndrome finished plasmaphresis for 5 days, change to every other day, added cytoxan per Dr. Ricky Martinez, tolerated well, Ab trend down, can continue rx outpt, follow Dr. Ricky Martinez in office for labs and further adjustment, helped to arrange transportation.  Stacey Troy M.D.   [address], [address] [place] [address]  Office : [phone]  Fax : [phone]

## 2017-06-29 ENCOUNTER — APPOINTMENT (OUTPATIENT)
Dept: GENERAL RADIOLOGY | Age: 57
DRG: 987 | End: 2017-06-29
Attending: INTERNAL MEDICINE
Payer: MEDICARE

## 2017-06-29 VITALS
TEMPERATURE: 98.1 F | OXYGEN SATURATION: 91 % | BODY MASS INDEX: 24.83 KG/M2 | DIASTOLIC BLOOD PRESSURE: 63 MMHG | WEIGHT: 158.2 LBS | HEIGHT: 67 IN | SYSTOLIC BLOOD PRESSURE: 98 MMHG | HEART RATE: 84 BPM | RESPIRATION RATE: 20 BRPM

## 2017-06-29 PROBLEM — R09.02 HYPOXIA: Status: ACTIVE | Noted: 2017-06-29

## 2017-06-29 LAB
FUNGUS CULTURE, RFCO2T: POSITIVE
FUNGUS CULTURE, RFCO2T: POSITIVE
FUNGUS SMEAR, RFCO1T: ABNORMAL
FUNGUS SMEAR, RFCO1T: ABNORMAL
FUNGUS SPEC CULT: NORMAL
FUNGUS SPEC CULT: NORMAL
FUNGUS STAIN, 188244: NORMAL
FUNGUS STAIN, 188244: NORMAL
REFLEX TO ID, RFCO3T: ABNORMAL
REFLEX TO ID, RFCO3T: ABNORMAL
SPECIMEN SOURCE: ABNORMAL
SPECIMEN SOURCE: ABNORMAL
SPECIMEN SOURCE: NORMAL
SPECIMEN SOURCE: NORMAL

## 2017-06-29 PROCEDURE — 99232 SBSQ HOSP IP/OBS MODERATE 35: CPT | Performed by: INTERNAL MEDICINE

## 2017-06-29 PROCEDURE — 74011250637 HC RX REV CODE- 250/637: Performed by: PHYSICIAN ASSISTANT

## 2017-06-29 PROCEDURE — 71020 XR CHEST PA LAT: CPT

## 2017-06-29 PROCEDURE — 74011250637 HC RX REV CODE- 250/637: Performed by: INTERNAL MEDICINE

## 2017-06-29 RX ORDER — POLYETHYLENE GLYCOL 3350 17 G/17G
17 POWDER, FOR SOLUTION ORAL DAILY
Qty: 30 PACKET | Refills: 1 | Status: SHIPPED | OUTPATIENT
Start: 2017-06-29 | End: 2021-11-19 | Stop reason: ALTCHOICE

## 2017-06-29 RX ORDER — AMOXICILLIN 250 MG
1 CAPSULE ORAL
Qty: 60 TAB | Refills: 1 | Status: SHIPPED | OUTPATIENT
Start: 2017-06-29 | End: 2022-05-19

## 2017-06-29 RX ORDER — CYCLOPHOSPHAMIDE 50 MG/1
150 CAPSULE ORAL EVERY 24 HOURS
Qty: 42 CAP | Refills: 0 | Status: SHIPPED | OUTPATIENT
Start: 2017-06-29 | End: 2017-07-13

## 2017-06-29 RX ADMIN — POLYETHYLENE GLYCOL 3350 17 G: 17 POWDER, FOR SOLUTION ORAL at 10:53

## 2017-06-29 RX ADMIN — DIAZEPAM 10 MG: 5 TABLET ORAL at 13:01

## 2017-06-29 RX ADMIN — PANTOPRAZOLE SODIUM 40 MG: 40 TABLET, DELAYED RELEASE ORAL at 05:36

## 2017-06-29 RX ADMIN — DESVENLAFAXINE 100 MG: 100 TABLET, EXTENDED RELEASE ORAL at 10:54

## 2017-06-29 RX ADMIN — GLYCERIN 1 SUPPOSITORY: 2.1 SUPPOSITORY RECTAL at 05:52

## 2017-06-29 RX ADMIN — B-COMPLEX W/ C & FOLIC ACID TAB 1 TABLET: TAB at 10:54

## 2017-06-29 RX ADMIN — Medication 5 ML: at 05:36

## 2017-06-29 RX ADMIN — DIAZEPAM 10 MG: 5 TABLET ORAL at 06:53

## 2017-06-29 RX ADMIN — GABAPENTIN 1200 MG: 400 CAPSULE ORAL at 10:54

## 2017-06-29 RX ADMIN — OXYCODONE HYDROCHLORIDE 30 MG: 15 TABLET ORAL at 06:53

## 2017-06-29 RX ADMIN — LAMOTRIGINE 100 MG: 100 TABLET ORAL at 10:53

## 2017-06-29 RX ADMIN — DOXAZOSIN 4 MG: 4 TABLET ORAL at 10:53

## 2017-06-29 RX ADMIN — DIAZEPAM 10 MG: 5 TABLET ORAL at 03:15

## 2017-06-29 RX ADMIN — OXYCODONE HYDROCHLORIDE 30 MG: 15 TABLET ORAL at 02:27

## 2017-06-29 RX ADMIN — BACLOFEN 20 MG: 10 TABLET ORAL at 10:54

## 2017-06-29 RX ADMIN — DEXTROAMPHETAMINE SACCHARATE, AMPHETAMINE ASPARTATE, DEXTROAMPHETAMINE SULFATE AND AMPHETAMINE SULFATE 30 MG: 3.75; 3.75; 3.75; 3.75 TABLET ORAL at 10:53

## 2017-06-29 RX ADMIN — OXYCODONE HYDROCHLORIDE 30 MG: 15 TABLET ORAL at 16:20

## 2017-06-29 NOTE — PROGRESS NOTES
Discharge instructions and prescriptions provided and explained to the pt and son. Explained appointment times. Son will transport. Med side effect sheet reviewed. Opportunity for questions provided. Upon speaking with patient realized that oxygen had not been ordered. Spoke with Cornelia Spatz at [facility] and he will have the oxygen delivered. Pt ready to go as soon as oxygen gets here.

## 2017-06-29 NOTE — PROGRESS NOTES
Valium 10 mg PO given per patient request. States \"Why don't you bring me the Valium and the Oxycodone at the same time? \" Educated patient on his low blood pressure issues and his BP dropping yesterday after being given Valium and Oxycodone at the same time resulting in fluid bolus being given. Patient states \"you must be new up here then. \" Without further needs at this time.  Will monitor with luc light in lap and door closed per request.

## 2017-06-29 NOTE — PROGRESS NOTES
GI DAILY PROGRESS NOTE    Admit Date:  6/15/2017    Today's Date:  6/29/2017    CC:  Chronic ABD Pain and Constipation     Subjective:     Patient reports small BM this am with suppository. Denies rectal bleed. Reports continued lower abd/pelvic discomfort. Tolerating\g diet.      Medications:   Current Facility-Administered Medications   Medication Dose Route Frequency    polyethylene glycol (MIRALAX) packet 17 g  17 g Oral DAILY    furosemide (LASIX) injection 40 mg  40 mg IntraVENous BID    0.9% sodium chloride infusion 250 mL  250 mL IntraVENous PRN    0.9% sodium chloride infusion 1,000 mL  1,000 mL IntraVENous PRN    heparin (PF) 2 units/ml in NS infusion 2,000 Units  1,000 mL Irrigation Multiple    sodium chloride (NS) flush 5-10 mL  5-10 mL IntraVENous Q8H    sodium chloride (NS) flush 5-10 mL  5-10 mL IntraVENous PRN    lidocaine (XYLOCAINE) 2 % jelly   Mucous Membrane PRN    acetaminophen (TYLENOL) tablet 650 mg  650 mg Oral Q4H PRN    diphenhydrAMINE (BENADRYL) capsule 25 mg  25 mg Oral Q6H PRN    cyclophosphamide (CYTOXAN) chemo capsule 150 mg  150 mg Oral Q24H    dextroamphetamine-amphetamine (ADDERALL) tablet 30 mg  30 mg Oral TID    baclofen (LIORESAL) tablet 20 mg  20 mg Oral TID    DESVENLAFAXINE SUCCINATE (PRISTIQ PO) - patient supplied  100 mg Oral BID    doxazosin (CARDURA) tablet 4 mg  4 mg Oral DAILY    gabapentin (NEURONTIN) capsule 1,200 mg  1,200 mg Oral TID    lamoTRIgine (LaMICtal) tablet 100 mg  100 mg Oral DAILY    mirtazapine (REMERON) tablet 30 mg  30 mg Oral QHS    multivitamin, stress formula (STRESS TAB) tablet 1 Tab  1 Tab Oral DAILY    pantoprazole (PROTONIX) tablet 40 mg  40 mg Oral ACB    oxyCODONE IR (OXY-IR) immediate release tablet 30 mg  30 mg Oral Q4H PRN    diazePAM (VALIUM) tablet 10 mg  10 mg Oral Q4H PRN    sodium chloride (NS) flush 5-10 mL  5-10 mL IntraVENous Q8H    sodium chloride (NS) flush 5-10 mL  5-10 mL IntraVENous PRN    albuterol (PROVENTIL VENTOLIN) nebulizer solution 2.5 mg  2.5 mg Nebulization Q6H PRN    acetaminophen (TYLENOL) tablet 650 mg  650 mg Oral Q4H PRN    naloxone (NARCAN) injection 0.4 mg  0.4 mg IntraVENous PRN    ondansetron (ZOFRAN) injection 4 mg  4 mg IntraVENous Q4H PRN    senna-docusate (PERICOLACE) 8.6-50 mg per tablet 1 Tab  1 Tab Oral BID PRN       Review of Systems:  ROS was obtained, with pertinent positives as listed above. No chest pain or SOB. Objective:   Vitals:  Visit Vitals    BP (!) 88/61 (BP 1 Location: Right arm, BP Patient Position: Sitting)    Pulse 96    Temp 97.5 °F (36.4 °C)    Resp 20    Ht 5' 7\" (1.702 m)    Wt 71.8 kg (158 lb 3.2 oz)    SpO2 93%    BMI 24.78 kg/m2     Intake/Output:  06/29 0701 - 06/29 1900  In: 240 [P.O.:240]  Out: -   06/27 1901 - 06/29 0700  In: 2336 [P.O.:600; I.V.:431]  Out: 6597 [Urine:2650]  Exam:  General appearance: alert, cooperative, no distress  Lungs: clear to auscultation bilaterally anteriorly  Heart: regular rate and rhythm  Abdomen: soft, mildly ttp lorenzo lower abd w/o R/G.  Bowel sounds normal.  Extremities: extremities normal, atraumatic, no cyanosis or edema  Neuro:  alert and oriented    Data Review (Labs):    Recent Labs      06/28/17   1705  06/28/17   1120  06/26/17   1955   WBC   --   6.3  10.0   HGB   --   10.4*  10.1*   HCT   --   32.9*  32.0*   PLT   --   224  234   MCV   --   83.3  82.7   NA   --   145  145   K   --   3.6  3.3*   CL   --   103  103   CO2   --   34*  35*   BUN   --   11  8   CREA   --   0.97  1.07   CA   --   9.0  8.7   MG   --   1.9   --    GLU   --   91  113*   AP   --   85  74   SGOT   --   20  24   ALT   --   23  26   TBILI   --   0.3  0.3   ALB   --   3.2*  2.9*   TP   --   6.5  6.1*   APTT  34.8*   --    --        Assessment:     Principal Problem:    Pulmonary cavitary lesion (6/15/2017)    Active Problems:    Anxiety and depression (6/15/2017)      History of hepatitis C (6/15/2017)      Overview: S/p treatment Chronic shoulder pain (6/15/2017)      HLD (hyperlipidemia) (6/15/2017)      BPH (benign prostatic hyperplasia) (6/16/2017)      Abnormal CXR (6/15/2017)      Elevated C-reactive protein (CRP) (6/15/2017)      Goodpasture syndrome ([facility] [address] (6/17/2017)      Severe protein-calorie malnutrition ([address] (6/20/2017)      Schatzki's ring (6/23/2017)      Edema (6/25/2017)      Hypoxia (6/29/2017)      61 y.o. male with PMH of depression, cirrhosis, anxiety, ADD, Hep C (s/p Pegasys tx), BPH and chronic pain admitted with chills, SOB, edema and lung lesions. Patient reports diffuse abd pain worse on right side for over 1 year. This is worse with constipation. He has a hx of chronic constipation due to narcotic use. Miralax was recommneded at his last office visit on 6/2/17 but he has not tried this. He denies rectal bleed. He has lost 40 pounds over the past 6 months. He underwent colonoscopy on 9/4/14 for screening a a history of colon polys with fair prep and diverticuloses. A 3 year colonoscopy surveillance was recommended with a 2 day clear, MOM and Miralax prep. He has a hx of esophageal spasm and dysphagia. He was seen in consult during this hospitalization on 6/21 for dysphagia and underwent EGD 6/22 with mild ring s/p dilation. This improved dysphagia per patient. He is on every other day plasmapheresis for Goodpasture ds. CT abd this admission did not demonstrate etiology of pain. Pelvis was not imaged. Plan:     1. Continue miralax 17 g daily. Can increase to 34 g daily if not improved in 5-7 days  2. Will be due for colonoscopy 9/2017. 3. Minimize pain medication use, encouraged ambulation   4. If patient has persistent abd pain despite normalization of bowel habits and negative colonoscopy, will need need CT of pelvis    Violeta Mujica NP  Patient is seen and examined in collaboration with Dr. Cristel Jarquin. Assessment and plan as per Dr. Kate Soto.

## 2017-06-29 NOTE — PROGRESS NOTES
Mr. Ingrid Olivas ambulating from bathroom to bed. Ambulates without gait disturbance or assistance. Alert, oriented in all fields. Appears agitated; asking \"are you gonna keep my pain medicine away from me again today like you did yesterday? \" Lungs clear on 2 lpm NC without dyspnea or cough. No edema noted. BP 88/61; asymptomatic. Without further needs at this time.  Call light at reach and door closed per request.

## 2017-06-29 NOTE — PROGRESS NOTES
Oxycodone given for pain at this time. IV to left arm removed; tip intact. Right tunneled catheter to be discharged with patient for outpatient infusions starting tomorrow. Patient has all personal belongings with him and HOME MEDICATIONS (Pristiq) RETURNED. Son to transport patient home and take him to his first pheresis appointment tomorrow. Without further needs at this time. Will monitor until son arrives.

## 2017-06-29 NOTE — DISCHARGE INSTRUCTIONS
Chronic Pain: Care Instructions  Your Care Instructions  Chronic pain is pain that lasts a long time (months or even years) and may or may not have a clear cause. It is different from acute pain, which usually does have a clear cause--like an injury or illness--and gets better over time. Chronic pain:  · Lasts over time but may vary from day to day. · Does not go away despite efforts to end it. · May disrupt your sleep and lead to fatigue. · May cause depression or anxiety. · May make your muscles tense, causing more pain. · Can disrupt your work, hobbies, home life, and relationships with friends and family. Chronic pain is a very real condition. It is not just in your head. Treatment can help and usually includes several methods used together, such as medicines, physical therapy, exercise, and other treatments. Learning how to relax and changing negative thought patterns can also help you cope. Chronic pain is complex. Taking an active role in your treatment will help you better manage your pain. Tell your doctor if you have trouble dealing with your pain. You may have to try several things before you find what works best for you. Follow-up care is a key part of your treatment and safety. Be sure to make and go to all appointments, and call your doctor if you are having problems. Its also a good idea to know your test results and keep a list of the medicines you take. How can you care for yourself at home? · Pace yourself. Break up large jobs into smaller tasks. Save harder tasks for days when you have less pain, or go back and forth between hard tasks and easier ones. Take rest breaks. · Relax, and reduce stress. Relaxation techniques such as deep breathing or meditation can help. · Keep moving. Gentle, daily exercise can help reduce pain over the long run. Try low- or no-impact exercises such as walking, swimming, and stationary biking. Do stretches to stay flexible.   · Try heat, cold packs, and massage. · Get enough sleep. Chronic pain can make you tired and drain your energy. Talk with your doctor if you have trouble sleeping because of pain. · Think positive. Your thoughts can affect your pain level. Do things that you enjoy to distract yourself when you have pain instead of focusing on the pain. See a movie, read a book, listen to music, or spend time with a friend. · If you think you are depressed, talk to your doctor about treatment. · Keep a daily pain diary. Record how your moods, thoughts, sleep patterns, activities, and medicine affect your pain. You may find that your pain is worse during or after certain activities or when you are feeling a certain emotion. Having a record of your pain can help you and your doctor find the best ways to treat your pain. · Take pain medicines exactly as directed. ¨ If the doctor gave you a prescription medicine for pain, take it as prescribed. ¨ If you are not taking a prescription pain medicine, ask your doctor if you can take an over-the-counter medicine. Reducing constipation caused by pain medicine  · Include fruits, vegetables, beans, and whole grains in your diet each day. These foods are high in fiber. · Drink plenty of fluids, enough so that your urine is light yellow or clear like water. If you have kidney, heart, or liver disease and have to limit fluids, talk with your doctor before you increase the amount of fluids you drink. · If your doctor recommends it, get more exercise. Walking is a good choice. Bit by bit, increase the amount you walk every day. Try for at least 30 minutes on most days of the week. · Schedule time each day for a bowel movement. A daily routine may help. Take your time and do not strain when having a bowel movement. When should you call for help? Call your doctor now or seek immediate medical care if:  · Your pain gets worse or is out of control. · You feel down or blue, or you do not enjoy things like you once did. You may be depressed, which is common in people with chronic pain. Depression can be treated. · You have vomiting or cramps for more than 2 hours. Watch closely for changes in your health, and be sure to contact your doctor if:  · You cannot sleep because of pain. · You are very worried or anxious about your pain. · You have trouble taking your pain medicine. · You have any concerns about your pain medicine. · You have trouble with bowel movements, such as:  ¨ No bowel movement in 3 days. ¨ Blood in the anal area, in your stool, or on the toilet paper. ¨ Diarrhea for more than 24 hours. Where can you learn more? Go to http://dionne-keyana.info/. Enter N004 in the search box to learn more about \"Chronic Pain: Care Instructions. \"  Current as of: October 14, 2016  Content Version: 11.3  © 8817-3765 [employer]. Care instructions adapted under license by [employer] (which disclaims liability or warranty for this information). If you have questions about a medical condition or this instruction, always ask your healthcare professional. [place] [address] any warranty or liability for your use of this information. Leg and Ankle Edema: Care Instructions  Your Care Instructions  Swelling in the legs, ankles, and feet is called edema. It is common after you sit or stand for a while. Long plane flights or car rides often cause swelling in the legs and feet. You may also have swelling if you have to stand for long periods of time at your job. Problems with the veins in the legs (varicose veins) and changes in hormones can also cause swelling. Sometimes the swelling in the ankles and feet is caused by a more serious problem, such as heart failure, infection, blood clots, or liver or kidney disease. Follow-up care is a key part of your treatment and safety. Be sure to make and go to all appointments, and call your doctor if you are having problems. Its also a good idea to know your test results and keep a list of the medicines you take. How can you care for yourself at home? · If your doctor gave you medicine, take it as prescribed. Call your doctor if you think you are having a problem with your medicine. · Whenever you are resting, raise your legs up. Try to keep the swollen area higher than the level of your heart. · Take breaks from standing or sitting in one position. ¨ Walk around to increase the blood flow in your lower legs. ¨ Move your feet and ankles often while you stand, or tighten and relax your leg muscles. · Wear support stockings. Put them on in the morning, before swelling gets worse. · Eat a balanced diet. Lose weight if you need to. · Limit the amount of salt (sodium) in your diet. Salt holds fluid in the body and may increase swelling. When should you call for help? Call 911 anytime you think you may need emergency care. For example, call if:  · You have symptoms of a blood clot in your lung (called a pulmonary embolism). These may include:  ¨ Sudden chest pain. ¨ Trouble breathing. ¨ Coughing up blood. Call your doctor now or seek immediate medical care if:  · You have signs of a blood clot, such as:  ¨ Pain in your calf, back of the knee, thigh, or groin. ¨ Redness and swelling in your leg or groin. · You have symptoms of infection, such as:  ¨ Increased pain, swelling, warmth, or redness. ¨ Red streaks or pus. ¨ A fever. Watch closely for changes in your health, and be sure to contact your doctor if:  · Your swelling is getting worse. · You have new or worsening pain in your legs. · You do not get better as expected. Where can you learn more? Go to http://dionne-keyana.info/. Enter V655 in the search box to learn more about \"Leg and Ankle Edema: Care Instructions. \"  Current as of: March 20, 2017  Content Version: 11.3  © 7182-1420 [employer].  Care instructions adapted under license by [address] (which disclaims liability or warranty for this information). If you have questions about a medical condition or this instruction, always ask your healthcare professional. [address] any warranty or liability for your use of this information. DISCHARGE SUMMARY from Nurse    The following personal items are in your possession at time of discharge:    Dental Appliances: Uppers, Lowers  Visual Aid: Glasses     Home Medications: None  Jewelry: None  Clothing: Pants, Shirt, Footwear  Other Valuables: None             PATIENT INSTRUCTIONS:    After general anesthesia or intravenous sedation, for 24 hours or while taking prescription Narcotics:  · Limit your activities  · Do not drive and operate hazardous machinery  · Do not make important personal or business decisions  · Do  not drink alcoholic beverages  · If you have not urinated within 8 hours after discharge, please contact your surgeon on call. Report the following to your surgeon:  · Excessive pain, swelling, redness or odor of or around the surgical area  · Temperature over 100.5  · Nausea and vomiting lasting longer than 4 hours or if unable to take medications  · Any signs of decreased circulation or nerve impairment to extremity: change in color, persistent  numbness, tingling, coldness or increase pain  · Any questions        What to do at Home:  Recommended activity: Activity as tolerated,     *  Please give a list of your current medications to your Primary Care Provider. *  Please update this list whenever your medications are discontinued, doses are      changed, or new medications (including over-the-counter products) are added. *  Please carry medication information at all times in case of emergency situations.           These are general instructions for a healthy lifestyle:    No smoking/ No tobacco products/ Avoid exposure to second hand smoke    Surgeon General's Warning:  Quitting smoking now greatly reduces serious risk to your health. Obesity, smoking, and sedentary lifestyle greatly increases your risk for illness    A healthy diet, regular physical exercise & weight monitoring are important for maintaining a healthy lifestyle    You may be retaining fluid if you have a history of heart failure or if you experience any of the following symptoms:  Weight gain of 3 pounds or more overnight or 5 pounds in a week, increased swelling in our hands or feet or shortness of breath while lying flat in bed. Please call your doctor as soon as you notice any of these symptoms; do not wait until your next office visit. Recognize signs and symptoms of STROKE:    F-face looks uneven    A-arms unable to move or move unevenly    S-speech slurred or non-existent    T-time-call 911 as soon as signs and symptoms begin-DO NOT go       Back to bed or wait to see if you get better-TIME IS BRAIN. Warning Signs of HEART ATTACK     Call 911 if you have these symptoms:   Chest discomfort. Most heart attacks involve discomfort in the center of the chest that lasts more than a few minutes, or that goes away and comes back. It can feel like uncomfortable pressure, squeezing, fullness, or pain.  Discomfort in other areas of the upper body. Symptoms can include pain or discomfort in one or both arms, the back, neck, jaw, or stomach.  Shortness of breath with or without chest discomfort.  Other signs may include breaking out in a cold sweat, nausea, or lightheadedness. Don't wait more than five minutes to call 911 - MINUTES MATTER! Fast action can save your life. Calling 911 is almost always the fastest way to get lifesaving treatment. Emergency Medical Services staff can begin treatment when they arrive -- up to an hour sooner than if someone gets to the hospital by car. The discharge information has been reviewed with the patient. The patient verbalized understanding.     Discharge medications reviewed with the patient and appropriate educational materials and side effects teaching were provided.

## 2017-06-29 NOTE — DISCHARGE SUMMARY
[facility] Hematology & Oncology: Inpatient Hematology / Oncology Discharge Summary Note    Patient ID:  Caitlyn Naidu  [id number]  43 y.o.  1960    Admit Date: 6/15/2017    Discharge Date: 6/29/2017    Admission Diagnoses: Lung mass [R91.8]  Dysphagia, unspecified [R13.10]  Esophageal stricture [K22.2]    Discharge Diagnoses:  Principal Diagnosis: Pulmonary cavitary lesion  Principal Problem:    Pulmonary cavitary lesion (6/15/2017)    Active Problems:    Anxiety and depression (6/15/2017)      History of hepatitis C (6/15/2017)      Overview: S/p treatment      Chronic shoulder pain (6/15/2017)      HLD (hyperlipidemia) (6/15/2017)      BPH (benign prostatic hyperplasia) (6/16/2017)      Abnormal CXR (6/15/2017)      Elevated C-reactive protein (CRP) (6/15/2017)      Goodpasture syndrome ([facility] (6/17/2017)      Severe protein-calorie malnutrition ([facility] (6/20/2017)      Schatzki's ring (6/23/2017)      Edema (6/25/2017)      Hypoxia (6/29/2017)      Hospital Course:  Mr Stephen Philip is a 62 yo male patient admitted on 6/15/17 for w/u of cavitary lung lesions. He was seen by pulmonary and they felt the appearnace of the lesion was more consistent with infectious or inflammatory processes. Ultimately, and anti-GBM was done and returned high at 75, consistent with good pastures syndrome. He was seen by nephrology who did not feel a renal biopsy was necessary given his preserved renal function. We were consulted for the initiation of plasmapheresis. He has completed 7 treatments and is now on an every other day regimen. His repeat anti-GBM on 6/27 shows improvement, down to 29 (high level is 20). He will be discharged home today with infusion appointment tomorrow for his next PLEX and he will see Dr. Celestine Suárez, pulmonary and nephrology in follow up to determine ongoing plan of care. He will continue the oral cytoxan until directed to stop by Dr. Celestine Suárez. He will call our office with any questions or concerns. Transportation has been a concern but I spoke with his son, Teodoro Pyle, who lives in [place] and will ensure he gets to the center where he needs to be. Consults:  IP CONSULT TO NEPHROLOGY  IP CONSULT TO HEMATOLOGY  IP CONSULT TO INTERVENTIONAL RADIOLOGY  IP CONSULT TO INFECTIOUS DISEASES  IP CONSULT TO GASTROENTEROLOGY  IP CONSULT TO GASTROENTEROLOGY  IP CONSULT TO CARDIOLOGY    Pertinent Diagnostic Studies:   Labs:    Recent Labs      06/28/17   1120 06/26/17 1955   WBC  6.3  10.0   HGB  10.4*  10.1*   PLT  224  234   ANEU  3.4  7.2    Recent Labs      06/28/17   1120  06/26/17 1955   NA  145  145   K  3.6  3.3*   CL  103  103   CO2  34*  35*   GLU  91  113*   BUN  11  8   CREA  0.97  1.07   CA  9.0  8.7   SGOT  20  24   AP  85  74   TP  6.5  6.1*   ALB  3.2*  2.9*   MG  1.9   --        Imaging:  CXR 6/28  FINDINGS: Right-sided internal jugular catheter is again seen, strandy opacity  and volume loss with some shift of the upper mediastinum to the right is again  noted, cardiac silhouette and pulmonary vascularity are considered normal.     IMPRESSION: Overall no significant interval change with differences in  positioning and technique. Current Discharge Medication List      START taking these medications    Details   cyclophosphamide (CYTOXAN) 50 mg capsule Take 3 Caps by mouth every twenty-four (24) hours for 14 days. Qty: 42 Cap, Refills: 0      polyethylene glycol (MIRALAX) 17 gram packet Take 1 Packet by mouth daily. Qty: 30 Packet, Refills: 1      senna-docusate (PERICOLACE) 8.6-50 mg per tablet Take 1 Tab by mouth two (2) times daily as needed for Constipation. Qty: 60 Tab, Refills: 1         CONTINUE these medications which have NOT CHANGED    Details   oxyCODONE IR (OXY-IR) 30 mg immediate release tablet Take 1 Tab by mouth every four (4) hours as needed for Pain.   Qty: 180 Tab, Refills: 0      lamoTRIgine (LAMICTAL) 100 mg tablet TK 1 T PO Q DAY  Refills: 2      baclofen (LIORESAL) 20 mg tablet Take 1 Tab by mouth three (3) times daily. Qty: 270 Tab, Refills: 3      gabapentin (NEURONTIN) 600 mg tablet Take 2 Tabs by mouth three (3) times daily. Qty: 180 Tab, Refills: 11    Comments: To replace previous order for 400 mg capsules      amphetamine-dextroamphetamine (ADDERALL) 30 mg tablet Take 30 mg by mouth three (3) times daily. DESVENLAFAXINE SUCCINATE (PRISTIQ PO) Take 100 mg by mouth daily. VALIUM 10 mg Tab Take 10 mg by mouth every four (4) hours as needed. doxazosin (CARDURA) 4 mg tablet Take 1 Tab by mouth daily. Indications: SYMPTOMATIC BENIGN PROSTATIC HYPERPLASIA  Qty: 30 Tab, Refills: 6      omeprazole (PRILOSEC) 40 mg capsule Take 1 Cap by mouth daily. Qty: 30 Cap, Refills: 11      mirtazapine (REMERON) 30 mg tablet 2 tabs po qhs      multivitamin, stress formula (STRESS TAB) tablet Take 1 Tab by mouth daily. STOP taking these medications       levoFLOXacin (LEVAQUIN) 750 mg tablet Comments:   Reason for Stopping:                 OBJECTIVE:  Patient Vitals for the past 8 hrs:   BP Temp Pulse Resp SpO2   17 1520 98/63 98.1 °F (36.7 °C) 84 20 91 %   17 1146 96/58 97.6 °F (36.4 °C) 76 20 92 %     Temp (24hrs), Av.9 °F (36.6 °C), Min:97.5 °F (36.4 °C), Max:98.3 °F (36.8 °C)     0701 -  1900  In: 480 [P.O.:480]  Out: 700 [Urine:700]    Physical Exam:  Constitutional: Well developed, well nourished male in no acute distress, laying in hospital bed   HEENT: Normocephalic and atraumatic. Oropharynx is clear, mucous membranes are moist. Neck supple     Lymph node   Deferred   Skin Warm and dry. No bruising and no rash noted. No erythema. No pallor. Respiratory Lungs are clear to auscultation bilaterally without wheezes, rales or rhonchi, normal air exchange without accessory muscle use. CVS Normal rate, regular rhythm and normal S1 and S2. No murmurs, gallops, or rubs.    Abdomen Soft, nontender and nondistended, normoactive bowel sounds. No palpable mass. No hepatosplenomegaly. Neuro Grossly nonfocal with no obvious sensory or motor deficits. MSK Normal range of motion in general.  No edema and no tenderness. Psych Appropriate mood and affect. ASSESSMENT:    Principal Problem:    Pulmonary cavitary lesion (6/15/2017)    Active Problems:    Anxiety and depression (6/15/2017)      History of hepatitis C (6/15/2017)      Overview: S/p treatment      Chronic shoulder pain (6/15/2017)      HLD (hyperlipidemia) (6/15/2017)      BPH (benign prostatic hyperplasia) (6/16/2017)      Abnormal CXR (6/15/2017)      Elevated C-reactive protein (CRP) (6/15/2017)      Goodpasture syndrome ([facility] (6/17/2017)      Severe protein-calorie malnutrition ([address] (6/20/2017)      Schatzki's ring (6/23/2017)      Edema (6/25/2017)      Hypoxia (6/29/2017)        DISPOSITION:  Follow-up Appointments   Procedures    FOLLOW UP VISIT Appointment in: Other (Specify) Please schedule f/u appt with Dr. Meaghan Collins on 6/30 with labs and PLEX. Please schedule f/u appt with Dr. Meahgan Collins on 6/30 with labs and PLEX. Standing Status:   Standing     Number of Occurrences:   1     Order Specific Question:   Appointment in     Answer: Other (Specify)    FOLLOW UP VISIT Appointment in: Other (Specify) - Infusion appointments Friday and Sunday for plasmapheresis at [address] [place] [address] with Dr. Meaghan Collins in 1 week to discuss ongoing treatment ([phone]) - F/u with nephrology in 1 week - F/u . ..     - Infusion appointments Friday and Sunday for plasmapheresis at [address] [place] [address] with Dr. Meaghan Collins in 1 week to discuss ongoing treatment ([phone])  - F/u with nephrology in 1 week  - F/u pulmonary 2 weeks     Standing Status:   Standing     Number of Occurrences:   1     Order Specific Question:   Appointment in     Answer: Other (Specify)         Over 30 minutes was spent in discharge planning and coordination of care.             Rey Nicholson [facility]  [address] [address]  Office : [phone]  Fax : [phone]   Patient seen, examed and discussed with NP, agree with above history/assessment/plan. 61 y. o.male new dx Goodpasture syndrome finished plasmaphresis for 5 days, changed to every other day, added cytoxan per Dr. Candis Tran, tolerated well, Ab trending down, continue rx outpt, follow Dr. Candis Tran in office for labs and further adjustment, helped to arrange transportation.  Brett Walls M.D.   [place] [address]  Office : [phone]  Fax : [phone]

## 2017-06-29 NOTE — PROGRESS NOTES
Problem: Nutrition Deficit  Goal: *Optimize nutritional status  Nutrition F/U: Day 14  Assessment:   Diet order(s): Regular  Food/Nutrition Patient History:  Patient states he is ready to discharge. Reports he is eating well and that his appetite is doing great. He has been very pleased with the food during this admission. Denies any questions or concerns over food or nutrition at this time. Anthropometrics:Height: 5' 7\" (170.2 cm),  Weight: 71.8 kg (158 lb),  Body mass index is 24.8 kg/(m^2). Macronutrient needs:  EER:  3442-0749 kcal /day (25-30 kcal/kg actual BW)  EPR:  67-87 grams protein/day (1-1.3 grams/kg IBW)  Intake/Comparative Standards: Average intake for past 16 recorded meal(s): 94%. This potentially meets ~100% of kcal and ~100% of protein needs      Intervention:  Meals and snacks: Continue current diet. Nutrition Supplement Therapy: Continue mighty shake BID  Nutrition Discharge Plan: None at this time, patient is meeting nutritional needs without supplementation. Compa Staley [address], [address] [place] [address], -[phone]

## 2017-06-29 NOTE — PROGRESS NOTES
Yudelka Langston Adcox  Admission Date: 6/15/2017             Daily Progress Note: 6/29/2017    The patient's chart is reviewed and the patient is discussed with the staff. 62 yo male presents with chills, shortness of breath, and edema. CT chest 4/21/17 revealed RLL 4.4 cm x 3.1cm density. Had chest CT 5/8/17 with multiple large infiltrative nodules in R lung concerning for cavitation. Was seen 6/8 by Dr. Kana Thomas and started on Levaquin on 6/9. Was not any better, was hypoxic on RA, and was referred for admission / ongoing workup of cavitary lesion. History of smoking <1 ppd x 10 years off and on, quit in 2014. CRP elevated at 283. Anti GBM Ab +- initiated on Cyclophosphamide and pulse dose steroid 06/17/17. Nephrology consulted with no plans for renal bx (normal renal function / no blood or microscopic protein in urine). Also seen by heme/Onc with plans for plasma pheresis.  Chronic depression, anxiety, ADD,Hepatitis C, BPH, and chronic pain. Has been feeling poorly, lost 40lbs over 3 months secondary to anorexia.  Weaned to room air but using O2 prn. Undergoing plasmapheresis per hematology. AN with desat 1hr. Subjective:     Ambulating in the room, takes O2 off at times-encouraged to wear continuously. No BM yet. States is going home today.     Current Facility-Administered Medications   Medication Dose Route Frequency    polyethylene glycol (MIRALAX) packet 17 g  17 g Oral DAILY    furosemide (LASIX) injection 40 mg  40 mg IntraVENous BID    0.9% sodium chloride infusion 250 mL  250 mL IntraVENous PRN    0.9% sodium chloride infusion 1,000 mL  1,000 mL IntraVENous PRN    heparin (PF) 2 units/ml in NS infusion 2,000 Units  1,000 mL Irrigation Multiple    sodium chloride (NS) flush 5-10 mL  5-10 mL IntraVENous Q8H    sodium chloride (NS) flush 5-10 mL  5-10 mL IntraVENous PRN    lidocaine (XYLOCAINE) 2 % jelly   Mucous Membrane PRN    acetaminophen (TYLENOL) tablet 650 mg  650 mg Oral Q4H PRN    diphenhydrAMINE (BENADRYL) capsule 25 mg  25 mg Oral Q6H PRN    cyclophosphamide (CYTOXAN) chemo capsule 150 mg  150 mg Oral Q24H    dextroamphetamine-amphetamine (ADDERALL) tablet 30 mg  30 mg Oral TID    baclofen (LIORESAL) tablet 20 mg  20 mg Oral TID    DESVENLAFAXINE SUCCINATE (PRISTIQ PO) - patient supplied  100 mg Oral BID    doxazosin (CARDURA) tablet 4 mg  4 mg Oral DAILY    gabapentin (NEURONTIN) capsule 1,200 mg  1,200 mg Oral TID    lamoTRIgine (LaMICtal) tablet 100 mg  100 mg Oral DAILY    mirtazapine (REMERON) tablet 30 mg  30 mg Oral QHS    multivitamin, stress formula (STRESS TAB) tablet 1 Tab  1 Tab Oral DAILY    pantoprazole (PROTONIX) tablet 40 mg  40 mg Oral ACB    oxyCODONE IR (OXY-IR) immediate release tablet 30 mg  30 mg Oral Q4H PRN    diazePAM (VALIUM) tablet 10 mg  10 mg Oral Q4H PRN    sodium chloride (NS) flush 5-10 mL  5-10 mL IntraVENous Q8H    sodium chloride (NS) flush 5-10 mL  5-10 mL IntraVENous PRN    albuterol (PROVENTIL VENTOLIN) nebulizer solution 2.5 mg  2.5 mg Nebulization Q6H PRN    acetaminophen (TYLENOL) tablet 650 mg  650 mg Oral Q4H PRN    naloxone (NARCAN) injection 0.4 mg  0.4 mg IntraVENous PRN    ondansetron (ZOFRAN) injection 4 mg  4 mg IntraVENous Q4H PRN    senna-docusate (PERICOLACE) 8.6-50 mg per tablet 1 Tab  1 Tab Oral BID PRN       Review of Systems  Constitutional: negative for fever, chills, sweats  Cardiovascular: trace LE edema, negative for chest pain, palpitations, syncope  Gastrointestinal:  negative for dysphagia, reflux, vomiting, diarrhea, abdominal pain, or melena  Neurologic:  negative for focal weakness, numbness, headache    Objective:     Vitals:    06/28/17 1956 06/28/17 2352 06/29/17 0301 06/29/17 0420   BP: 91/52 100/61 94/52    Pulse: 79 76 68    Resp: 18 20 18    Temp: 98.3 °F (36.8 °C) 98.2 °F (36.8 °C) 98.3 °F (36.8 °C)    SpO2: 93% 92% 98%    Weight:    158 lb 3.2 oz (71.8 kg)   Height: Intake and Output:   06/27 0701 - 06/28 1900  In: 1891 [P.O.:960; I.V.:431]  Out: 3313 [Urine:1600]  06/28 1901 - 06/29 0700  In: 120 [P.O.:120]  Out: 1050 [Urine:1050]    Physical Exam:   Constitution:  the patient is well developed and in no acute distress, NC 2L, sat 98%  EENMT:  Sclera clear, pupils equal, oral mucosa moist  Respiratory:   Clear anterior and posterior, no wheezing  Cardiovascular:  RRR without M,G,R  Gastrointestinal: soft and non-tender; with positive bowel sounds, no BM. Musculoskeletal: warm without cyanosis. There is trace lower leg edema. Skin:  no jaundice or rashes, no wounds   Neurologic: no gross neuro deficits     Psychiatric:  alert and oriented x 3    CXR: pending      LAB  No results for input(s): GLUCPOC in the last 72 hours. No lab exists for component: [place]   Recent Labs      06/28/17 1120 06/26/17 1955   WBC  6.3  10.0   HGB  10.4*  10.1*   HCT  32.9*  32.0*   PLT  224  234     Recent Labs      06/28/17 1120 06/26/17 1955   NA  145  145   K  3.6  3.3*   CL  103  103   CO2  34*  35*   GLU  91  113*   BUN  11  8   CREA  0.97  1.07   MG  1.9   --    CA  9.0  8.7   ALB  3.2*  2.9*   TBILI  0.3  0.3   ALT  23  26   SGOT  20  24     No results for input(s): PH, PCO2, PO2, HCO3 in the last 72 hours. No results for input(s): LCAD, LAC in the last 72 hours.       Assessment:  (Medical Decision Making)     Hospital Problems  Date Reviewed: 6/29/2017          Codes Class Noted POA    Hypoxia ICD-10-CM: R09.02  ICD-9-CM: 799.02  6/29/2017 No    Needs O2 per NC 2L    Edema ICD-10-CM: R60.9  ICD-9-CM: 782.3  6/25/2017 No    On IV Lasix    Schatzki's ring ICD-10-CM: K22.2  ICD-9-CM: 750.3  6/23/2017 Yes    chronic    Severe protein-calorie malnutrition (HCC) (Chronic) ICD-10-CM: E43  ICD-9-CM: 262  6/20/2017 Yes    chronic    Goodpasture syndrome ([facility] ICD-10-CM: M31.0  ICD-9-CM: 446.21  6/17/2017 Yes    Per oncology    BPH (benign prostatic hyperplasia) (Chronic) ICD-10-CM: N40.0  ICD-9-CM: 600.00  6/16/2017 Yes    chronic    Anxiety and depression (Chronic) ICD-10-CM: F41.9, F32.9  ICD-9-CM: 300.00, 311  6/15/2017 Yes    chronic    History of hepatitis C (Chronic) ICD-10-CM: Z86.19  ICD-9-CM: V12.09  6/15/2017 Yes     S/p treatment         chronic    Chronic shoulder pain (Chronic) ICD-10-CM: M25.519, G89.29  ICD-9-CM: 719.41, 338.29  6/15/2017 Yes    Chronic pain    HLD (hyperlipidemia) (Chronic) ICD-10-CM: E78.5  ICD-9-CM: 272.4  6/15/2017 Yes    chronic    * (Principal)Pulmonary cavitary lesion ICD-10-CM: J98.4  ICD-9-CM: 518.89  6/15/2017 Yes    Follow up CXR today    Abnormal CXR ICD-10-CM: R93.8  ICD-9-CM: 793.2  6/15/2017 Yes    Repeat CXR    Elevated C-reactive protein (CRP) ICD-10-CM: D35.37  ICD-9-CM: 790.95  6/15/2017 Yes    On admission          Plan:  (Medical Decision Making)     --IV  Lasix--urine output not recorded  --AN with desat and ambulating sat performed--needs home O2 2L continuous. Case management to arrange. --PA and lat CXR pending for today--May need repeat CT in the future due to cavitary & infiltrates   --Hematology planning continued plasma exchange as outpatient. On Cytoxan   --GI following for abdominal pain. --Will follow up in our office     More than 50% of the time documented was spent in face-to-face contact with the patient and in the care of the patient on the floor/unit where the patient is located. Gigi Mahmood NP    I have spoken with and examined the patient. I agree with the above assessment and plan as documented. Gen: comfortable on RA, supposed to be on 2lpm  Lungs:  Occasional rhonchi on R  Heart:  RRR with no Murmur/Rubs/Gallops  Abd: +BS    --Patient being discharged today  --Continue 2lpm and will f/u in our office with repeat imaging and ambulating oximetry.     Ines Castrejon MD

## 2017-06-30 ENCOUNTER — HOSPITAL ENCOUNTER (OUTPATIENT)
Dept: INFUSION THERAPY | Age: 57
Discharge: HOME | End: 2017-06-30
Payer: MEDICARE

## 2017-06-30 ENCOUNTER — HOSPITAL ENCOUNTER (OUTPATIENT)
Dept: INFUSION THERAPY | Age: 57
End: 2017-06-30

## 2017-06-30 ENCOUNTER — HOSPITAL ENCOUNTER (OUTPATIENT)
Dept: LAB | Age: 57
Discharge: HOME | End: 2017-06-30
Payer: MEDICARE

## 2017-06-30 VITALS — DIASTOLIC BLOOD PRESSURE: 60 MMHG | HEART RATE: 68 BPM | SYSTOLIC BLOOD PRESSURE: 94 MMHG

## 2017-06-30 DIAGNOSIS — K22.89 ESOPHAGEAL MASS: ICD-10-CM

## 2017-06-30 DIAGNOSIS — M31.0 GOODPASTURE SYNDROME (HCC): ICD-10-CM

## 2017-06-30 LAB
ALBUMIN SERPL BCP-MCNC: 3.8 G/DL (ref 3.5–5)
ALBUMIN/GLOB SERPL: 1.5 {RATIO} (ref 1.2–3.5)
ALP SERPL-CCNC: 69 U/L (ref 50–136)
ALT SERPL-CCNC: 18 U/L (ref 12–65)
ANION GAP BLD CALC-SCNC: 5 MMOL/L (ref 7–16)
APTT PPP: 36.3 SEC (ref 23.5–31.7)
AST SERPL W P-5'-P-CCNC: 18 U/L (ref 15–37)
BASOPHILS # BLD AUTO: 0 K/UL (ref 0–0.2)
BASOPHILS # BLD: 0 % (ref 0–2)
BILIRUB SERPL-MCNC: 0.7 MG/DL (ref 0.2–1.1)
BUN SERPL-MCNC: 8 MG/DL (ref 6–23)
CALCIUM SERPL-MCNC: 8.8 MG/DL (ref 8.3–10.4)
CHLORIDE SERPL-SCNC: 105 MMOL/L (ref 98–107)
CO2 SERPL-SCNC: 29 MMOL/L (ref 21–32)
CREAT SERPL-MCNC: 0.74 MG/DL (ref 0.8–1.5)
DIFFERENTIAL METHOD BLD: ABNORMAL
EOSINOPHIL # BLD: 0 K/UL (ref 0–0.8)
EOSINOPHIL NFR BLD: 0 % (ref 0.5–7.8)
ERYTHROCYTE [DISTWIDTH] IN BLOOD BY AUTOMATED COUNT: 21.5 % (ref 11.9–14.6)
FIBRINOGEN PPP-MCNC: 245 MG/DL (ref 172–437)
FUNGUS ID 1, (DID), RFIM1T: NORMAL
FUNGUS ID 1, (DID), RFIM1T: NORMAL
GLOBULIN SER CALC-MCNC: 2.5 G/DL (ref 2.3–3.5)
GLUCOSE SERPL-MCNC: 65 MG/DL (ref 65–100)
HCT VFR BLD AUTO: 30.8 % (ref 41.1–50.3)
HGB BLD-MCNC: 9.8 G/DL (ref 13.6–17.2)
INR PPP: 1.2 (ref 0.9–1.2)
LYMPHOCYTES # BLD AUTO: 18 % (ref 13–44)
LYMPHOCYTES # BLD: 2 K/UL (ref 0.5–4.6)
MCH RBC QN AUTO: 26.8 PG (ref 26.1–32.9)
MCHC RBC AUTO-ENTMCNC: 31.8 G/DL (ref 31.4–35)
MCV RBC AUTO: 84.2 FL (ref 79.6–97.8)
MONOCYTES # BLD: 0.5 K/UL (ref 0.1–1.3)
MONOCYTES NFR BLD AUTO: 4 % (ref 4–12)
NEUTS SEG # BLD: 8.9 K/UL (ref 1.7–8.2)
NEUTS SEG NFR BLD AUTO: 78 % (ref 43–78)
NRBC # BLD: 0 K/UL (ref 0–0.2)
PLATELET # BLD AUTO: 187 K/UL (ref 150–450)
PMV BLD AUTO: 9.2 FL (ref 10.8–14.1)
POTASSIUM SERPL-SCNC: 3.7 MMOL/L (ref 3.5–5.1)
PROT SERPL-MCNC: 6.3 G/DL (ref 6.3–8.2)
PROTHROMBIN TIME: 12.2 SEC (ref 9.6–12)
RBC # BLD AUTO: 3.66 M/UL (ref 4.23–5.67)
SODIUM SERPL-SCNC: 139 MMOL/L (ref 136–145)
SPECIMEN SOURCE: NORMAL
SPECIMEN SOURCE: NORMAL
WBC # BLD AUTO: 11.4 K/UL (ref 4.3–11.1)

## 2017-06-30 PROCEDURE — 74011250636 HC RX REV CODE- 250/636: Performed by: INTERNAL MEDICINE

## 2017-06-30 PROCEDURE — 80053 COMPREHEN METABOLIC PANEL: CPT | Performed by: INTERNAL MEDICINE

## 2017-06-30 PROCEDURE — 85610 PROTHROMBIN TIME: CPT | Performed by: INTERNAL MEDICINE

## 2017-06-30 PROCEDURE — 96360 HYDRATION IV INFUSION INIT: CPT

## 2017-06-30 PROCEDURE — 85384 FIBRINOGEN ACTIVITY: CPT | Performed by: INTERNAL MEDICINE

## 2017-06-30 PROCEDURE — 85025 COMPLETE CBC W/AUTO DIFF WBC: CPT | Performed by: INTERNAL MEDICINE

## 2017-06-30 PROCEDURE — 85730 THROMBOPLASTIN TIME PARTIAL: CPT | Performed by: INTERNAL MEDICINE

## 2017-06-30 PROCEDURE — 36514 APHERESIS PLASMA: CPT

## 2017-06-30 PROCEDURE — P9045 ALBUMIN (HUMAN), 5%, 250 ML: HCPCS | Performed by: INTERNAL MEDICINE

## 2017-06-30 RX ORDER — SODIUM CHLORIDE 0.9 % (FLUSH) 0.9 %
10-40 SYRINGE (ML) INJECTION AS NEEDED
Status: DISCONTINUED | OUTPATIENT
Start: 2017-06-30 | End: 2017-07-04 | Stop reason: HOSPADM

## 2017-06-30 RX ORDER — SODIUM CHLORIDE 9 MG/ML
1000 INJECTION, SOLUTION INTRAVENOUS ONCE
Status: COMPLETED | OUTPATIENT
Start: 2017-06-30 | End: 2017-06-30

## 2017-06-30 RX ORDER — SODIUM CHLORIDE 9 MG/ML
500 INJECTION, SOLUTION INTRAVENOUS ONCE
Status: COMPLETED | OUTPATIENT
Start: 2017-06-30 | End: 2017-06-30

## 2017-06-30 RX ORDER — ALBUMIN HUMAN 50 G/1000ML
200 SOLUTION INTRAVENOUS ONCE
Status: COMPLETED | OUTPATIENT
Start: 2017-06-30 | End: 2017-06-30

## 2017-06-30 RX ADMIN — SODIUM CHLORIDE 1000 ML: 900 INJECTION, SOLUTION INTRAVENOUS at 16:24

## 2017-06-30 RX ADMIN — Medication 10 ML: at 15:35

## 2017-06-30 RX ADMIN — Medication 30 ML: at 18:01

## 2017-06-30 RX ADMIN — CALCIUM GLUCONATE: 94 INJECTION, SOLUTION INTRAVENOUS at 15:40

## 2017-06-30 RX ADMIN — SODIUM CHLORIDE 500 ML: 900 INJECTION, SOLUTION INTRAVENOUS at 17:30

## 2017-06-30 RX ADMIN — ALBUMIN (HUMAN) 200 G: 12.5 INJECTION, SOLUTION INTRAVENOUS at 15:40

## 2017-06-30 NOTE — PROGRESS NOTES
Procedure: TPE  Dx: Good pasture syndrome  Day: 8  Labs drawn: see orders  Fibrinogen: 245  Calcium used:  4 grams  Replacement product: albumin  Replace volume: 1453  /  Remove volume: 1776  TBV processed:  100%  Other parameters: TPV 1.2  Issues: hypotension throughout procedure. BP did not respond after multiple times of pausing procedure and giving NS bolus. Procedure aborted after 29 minutes (not including multiple paused times for a few minutes a piece). Patient received approximately one-third of full treatment. Rinseback completed. 1 liter NS bolus given and BP did not respond to hydration. Per Dr. Tierney Ortiz, additional 500cc NS given. BP did respond after total of 1500cc NS given. Vitals as recorded. Patient discharged via wheelchair.   Next procedure date: 7/2/17 at 1pm at [facility]

## 2017-07-02 ENCOUNTER — HOSPITAL ENCOUNTER (OUTPATIENT)
Dept: INFUSION THERAPY | Age: 57
Discharge: HOME | End: 2017-07-02
Payer: MEDICARE

## 2017-07-02 VITALS
WEIGHT: 163.1 LBS | DIASTOLIC BLOOD PRESSURE: 58 MMHG | BODY MASS INDEX: 25.55 KG/M2 | SYSTOLIC BLOOD PRESSURE: 107 MMHG | OXYGEN SATURATION: 93 % | HEART RATE: 85 BPM | RESPIRATION RATE: 18 BRPM | TEMPERATURE: 98.4 F

## 2017-07-02 DIAGNOSIS — M31.0 GOODPASTURE SYNDROME (HCC): ICD-10-CM

## 2017-07-02 LAB
ALBUMIN SERPL BCP-MCNC: 3.9 G/DL (ref 3.5–5)
ALBUMIN/GLOB SERPL: 1.6 {RATIO} (ref 1.2–3.5)
ALP SERPL-CCNC: 78 U/L (ref 50–136)
ALT SERPL-CCNC: 22 U/L (ref 12–65)
ANION GAP BLD CALC-SCNC: 10 MMOL/L (ref 7–16)
APTT PPP: 36.3 SEC (ref 23.5–31.7)
AST SERPL W P-5'-P-CCNC: 28 U/L (ref 15–37)
BASOPHILS # BLD AUTO: 0 K/UL (ref 0–0.2)
BASOPHILS # BLD: 0 % (ref 0–2)
BILIRUB SERPL-MCNC: 0.4 MG/DL (ref 0.2–1.1)
BUN SERPL-MCNC: 8 MG/DL (ref 6–23)
CALCIUM SERPL-MCNC: 8.6 MG/DL (ref 8.3–10.4)
CHLORIDE SERPL-SCNC: 104 MMOL/L (ref 98–107)
CO2 SERPL-SCNC: 28 MMOL/L (ref 21–32)
CREAT SERPL-MCNC: 0.87 MG/DL (ref 0.8–1.5)
DIFFERENTIAL METHOD BLD: ABNORMAL
EOSINOPHIL # BLD: 0.3 K/UL (ref 0–0.8)
EOSINOPHIL NFR BLD: 5 % (ref 0.5–7.8)
ERYTHROCYTE [DISTWIDTH] IN BLOOD BY AUTOMATED COUNT: 21.9 % (ref 11.9–14.6)
FIBRINOGEN PPP-MCNC: 279 MG/DL (ref 172–437)
GLOBULIN SER CALC-MCNC: 2.5 G/DL (ref 2.3–3.5)
GLUCOSE SERPL-MCNC: 111 MG/DL (ref 65–100)
HCT VFR BLD AUTO: 30.1 % (ref 41.1–50.3)
HGB BLD-MCNC: 9.7 G/DL (ref 13.6–17.2)
IMM GRANULOCYTES # BLD: 0 K/UL (ref 0–0.5)
IMM GRANULOCYTES NFR BLD AUTO: 0.1 % (ref 0–5)
INR PPP: 1.1 (ref 0.9–1.2)
LYMPHOCYTES # BLD AUTO: 29 % (ref 13–44)
LYMPHOCYTES # BLD: 1.9 K/UL (ref 0.5–4.6)
MCH RBC QN AUTO: 26.9 PG (ref 26.1–32.9)
MCHC RBC AUTO-ENTMCNC: 32.2 G/DL (ref 31.4–35)
MCV RBC AUTO: 83.6 FL (ref 79.6–97.8)
MONOCYTES # BLD: 0.3 K/UL (ref 0.1–1.3)
MONOCYTES NFR BLD AUTO: 4 % (ref 4–12)
NEUTS SEG # BLD: 4.1 K/UL (ref 1.7–8.2)
NEUTS SEG NFR BLD AUTO: 62 % (ref 43–78)
PLATELET # BLD AUTO: 190 K/UL (ref 150–450)
PMV BLD AUTO: 8.8 FL (ref 10.8–14.1)
POTASSIUM SERPL-SCNC: 3.7 MMOL/L (ref 3.5–5.1)
PROT SERPL-MCNC: 6.4 G/DL (ref 6.3–8.2)
PROTHROMBIN TIME: 11.7 SEC (ref 9.6–12)
RBC # BLD AUTO: 3.6 M/UL (ref 4.23–5.67)
SODIUM SERPL-SCNC: 142 MMOL/L (ref 136–145)
WBC # BLD AUTO: 6.7 K/UL (ref 4.3–11.1)

## 2017-07-02 PROCEDURE — 80053 COMPREHEN METABOLIC PANEL: CPT | Performed by: INTERNAL MEDICINE

## 2017-07-02 PROCEDURE — 85730 THROMBOPLASTIN TIME PARTIAL: CPT | Performed by: INTERNAL MEDICINE

## 2017-07-02 PROCEDURE — 85025 COMPLETE CBC W/AUTO DIFF WBC: CPT | Performed by: INTERNAL MEDICINE

## 2017-07-02 PROCEDURE — P9045 ALBUMIN (HUMAN), 5%, 250 ML: HCPCS | Performed by: INTERNAL MEDICINE

## 2017-07-02 PROCEDURE — 74011250636 HC RX REV CODE- 250/636: Performed by: INTERNAL MEDICINE

## 2017-07-02 PROCEDURE — 85610 PROTHROMBIN TIME: CPT | Performed by: INTERNAL MEDICINE

## 2017-07-02 PROCEDURE — 36514 APHERESIS PLASMA: CPT

## 2017-07-02 PROCEDURE — 85384 FIBRINOGEN ACTIVITY: CPT | Performed by: INTERNAL MEDICINE

## 2017-07-02 RX ORDER — ALBUMIN HUMAN 50 G/1000ML
3821 SOLUTION INTRAVENOUS ONCE
Status: COMPLETED | OUTPATIENT
Start: 2017-07-02 | End: 2017-07-02

## 2017-07-02 RX ORDER — SODIUM CHLORIDE 0.9 % (FLUSH) 0.9 %
10-40 SYRINGE (ML) INJECTION AS NEEDED
Status: DISCONTINUED | OUTPATIENT
Start: 2017-07-02 | End: 2017-07-06 | Stop reason: HOSPADM

## 2017-07-02 RX ORDER — SODIUM CHLORIDE 9 MG/ML
1000 INJECTION, SOLUTION INTRAVENOUS ONCE
Status: COMPLETED | OUTPATIENT
Start: 2017-07-02 | End: 2017-07-02

## 2017-07-02 RX ADMIN — CALCIUM GLUCONATE: 94 INJECTION, SOLUTION INTRAVENOUS at 14:37

## 2017-07-02 RX ADMIN — Medication 10 ML: at 16:00

## 2017-07-02 RX ADMIN — ALBUMIN (HUMAN) 191.05 G: 12.5 INJECTION, SOLUTION INTRAVENOUS at 14:37

## 2017-07-02 RX ADMIN — Medication 10 ML: at 13:34

## 2017-07-02 RX ADMIN — SODIUM CHLORIDE 1000 ML: 900 INJECTION, SOLUTION INTRAVENOUS at 13:35

## 2017-07-02 NOTE — PROGRESS NOTES
Procedure: TPE  Dx: Marian Lints syndrome  Day: 9   Labs drawn: cbcd, cmp, pt/INR, ptt, fibrinogen  Fibrinogen: 279  Calcium used:  4 grams  Replacement product: albumin  Replace volume: 3834  /  Remove volume: 4684  TBV processed:  100%  Other parameters: TPV 1.2  Next procedure date: 7/5/17    Patient arrived ambulatory to infusion center accompanied by son. Labs drawn via apheresis catheter. 1 liter Ns bolus given prior to starting procedure. TPE completed and tolerated well by patient. Vitals as recorded. Patient aware of next scheduled appointment in outpatient infusion center on 7/5/17 at 8am. Discharged ambulatory accompanied by son.

## 2017-07-05 ENCOUNTER — HOSPITAL ENCOUNTER (OUTPATIENT)
Dept: INFUSION THERAPY | Age: 57
Discharge: HOME | End: 2017-07-05
Payer: MEDICARE

## 2017-07-05 VITALS
RESPIRATION RATE: 18 BRPM | DIASTOLIC BLOOD PRESSURE: 73 MMHG | OXYGEN SATURATION: 100 % | HEART RATE: 68 BPM | TEMPERATURE: 98.6 F | WEIGHT: 155.8 LBS | SYSTOLIC BLOOD PRESSURE: 113 MMHG | BODY MASS INDEX: 24.4 KG/M2

## 2017-07-05 DIAGNOSIS — M31.0 GOODPASTURE SYNDROME (HCC): ICD-10-CM

## 2017-07-05 LAB
ALBUMIN SERPL BCP-MCNC: 4.6 G/DL (ref 3.5–5)
ALBUMIN/GLOB SERPL: 2.2 {RATIO} (ref 1.2–3.5)
ALP SERPL-CCNC: 72 U/L (ref 50–136)
ALT SERPL-CCNC: 20 U/L (ref 12–65)
ANION GAP BLD CALC-SCNC: 7 MMOL/L (ref 7–16)
APTT PPP: 35.8 SEC (ref 23.5–31.7)
AST SERPL W P-5'-P-CCNC: 24 U/L (ref 15–37)
BASOPHILS # BLD AUTO: 0 K/UL (ref 0–0.2)
BASOPHILS # BLD: 1 % (ref 0–2)
BILIRUB SERPL-MCNC: 0.4 MG/DL (ref 0.2–1.1)
BUN SERPL-MCNC: 5 MG/DL (ref 6–23)
CALCIUM SERPL-MCNC: 9.1 MG/DL (ref 8.3–10.4)
CHLORIDE SERPL-SCNC: 104 MMOL/L (ref 98–107)
CO2 SERPL-SCNC: 29 MMOL/L (ref 21–32)
CREAT SERPL-MCNC: 0.82 MG/DL (ref 0.8–1.5)
DIFFERENTIAL METHOD BLD: ABNORMAL
EOSINOPHIL # BLD: 0 K/UL (ref 0–0.8)
EOSINOPHIL NFR BLD: 0 % (ref 0.5–7.8)
ERYTHROCYTE [DISTWIDTH] IN BLOOD BY AUTOMATED COUNT: 22.9 % (ref 11.9–14.6)
FIBRINOGEN PPP-MCNC: 229 MG/DL (ref 172–437)
GLOBULIN SER CALC-MCNC: 2.1 G/DL (ref 2.3–3.5)
GLUCOSE SERPL-MCNC: 134 MG/DL (ref 65–100)
HCT VFR BLD AUTO: 30.9 % (ref 41.1–50.3)
HGB BLD-MCNC: 10.1 G/DL (ref 13.6–17.2)
LYMPHOCYTES # BLD AUTO: 39 % (ref 13–44)
LYMPHOCYTES # BLD: 2.2 K/UL (ref 0.5–4.6)
MCH RBC QN AUTO: 27.5 PG (ref 26.1–32.9)
MCHC RBC AUTO-ENTMCNC: 32.7 G/DL (ref 31.4–35)
MCV RBC AUTO: 84.2 FL (ref 79.6–97.8)
MONOCYTES # BLD: 0.2 K/UL (ref 0.1–1.3)
MONOCYTES NFR BLD AUTO: 4 % (ref 4–12)
NEUTS SEG # BLD: 3.1 K/UL (ref 1.7–8.2)
NEUTS SEG NFR BLD AUTO: 56 % (ref 43–78)
NRBC # BLD: 0 K/UL (ref 0–0.2)
PLATELET # BLD AUTO: 193 K/UL (ref 150–450)
PMV BLD AUTO: 8.5 FL (ref 10.8–14.1)
POTASSIUM SERPL-SCNC: 3.3 MMOL/L (ref 3.5–5.1)
PROT SERPL-MCNC: 6.7 G/DL (ref 6.3–8.2)
RBC # BLD AUTO: 3.67 M/UL (ref 4.23–5.67)
SODIUM SERPL-SCNC: 140 MMOL/L (ref 136–145)
WBC # BLD AUTO: 5.6 K/UL (ref 4.3–11.1)

## 2017-07-05 PROCEDURE — 85730 THROMBOPLASTIN TIME PARTIAL: CPT | Performed by: INTERNAL MEDICINE

## 2017-07-05 PROCEDURE — 85384 FIBRINOGEN ACTIVITY: CPT | Performed by: INTERNAL MEDICINE

## 2017-07-05 PROCEDURE — 74011250637 HC RX REV CODE- 250/637: Performed by: NURSE PRACTITIONER

## 2017-07-05 PROCEDURE — 85025 COMPLETE CBC W/AUTO DIFF WBC: CPT | Performed by: INTERNAL MEDICINE

## 2017-07-05 PROCEDURE — P9045 ALBUMIN (HUMAN), 5%, 250 ML: HCPCS | Performed by: INTERNAL MEDICINE

## 2017-07-05 PROCEDURE — 80053 COMPREHEN METABOLIC PANEL: CPT | Performed by: INTERNAL MEDICINE

## 2017-07-05 PROCEDURE — 83516 IMMUNOASSAY NONANTIBODY: CPT | Performed by: INTERNAL MEDICINE

## 2017-07-05 PROCEDURE — 36514 APHERESIS PLASMA: CPT

## 2017-07-05 PROCEDURE — 74011250636 HC RX REV CODE- 250/636: Performed by: INTERNAL MEDICINE

## 2017-07-05 PROCEDURE — 74011000258 HC RX REV CODE- 258: Performed by: INTERNAL MEDICINE

## 2017-07-05 RX ORDER — POTASSIUM CHLORIDE 750 MG/1
40 TABLET, EXTENDED RELEASE ORAL
Status: COMPLETED | OUTPATIENT
Start: 2017-07-05 | End: 2017-07-05

## 2017-07-05 RX ORDER — SODIUM CHLORIDE 9 MG/ML
1000 INJECTION, SOLUTION INTRAVENOUS ONCE
Status: DISCONTINUED | OUTPATIENT
Start: 2017-07-05 | End: 2017-07-05

## 2017-07-05 RX ORDER — ALBUMIN HUMAN 50 G/1000ML
3731 SOLUTION INTRAVENOUS ONCE
Status: COMPLETED | OUTPATIENT
Start: 2017-07-05 | End: 2017-07-05

## 2017-07-05 RX ORDER — SODIUM CHLORIDE 9 MG/ML
500 INJECTION, SOLUTION INTRAVENOUS ONCE
Status: COMPLETED | OUTPATIENT
Start: 2017-07-05 | End: 2017-07-05

## 2017-07-05 RX ADMIN — ALBUMIN (HUMAN) 186.55 G: 12.5 INJECTION, SOLUTION INTRAVENOUS at 09:44

## 2017-07-05 RX ADMIN — SODIUM CHLORIDE 500 ML: 900 INJECTION, SOLUTION INTRAVENOUS at 09:07

## 2017-07-05 RX ADMIN — CALCIUM GLUCONATE 2 G: 94 INJECTION, SOLUTION INTRAVENOUS at 09:55

## 2017-07-05 RX ADMIN — POTASSIUM CHLORIDE 40 MEQ: 10 TABLET, EXTENDED RELEASE ORAL at 10:58

## 2017-07-05 NOTE — PROGRESS NOTES
Procedure: TPE  Dx: Truett Police Syndrome  Day: 10  Labs drawn: CBC with differential, CMP, PTT, Fibrinogen, Glomerular Basement Membrane AB  Fibrinogen: 229  Calcium used: 2 grams  Replacement product: Albumin  Replace volume: 3732  /  Remove volume: 3836  TBV processed: 100%  Other parameters: TPV 1.2  Next procedure date: 7/8/17 at 0800 [facility]    Patient arrived via wheelchair to Infusion with family member. Labs drawn via apheresis catheter. Sterile central line dressing changed performed. Due to patient's past BP problems during TPE procedure, 500 ml NS given before starting procedure. TPE completed and tolerated well by patient. Patient experienced some numbness/tingling to his left foot. IV calcium rate increased, numbness/tingling resolved shortly after. Patient given 40 mEq PO potassium per Alisson Thomas NP for potassium level of 3.3. Patient has not been able to start PO Cytoxan due to [place] not having medication. Ingrid Mcmanus RN notified. Financial assistance with [facility] to work on prior authorization for patient. Vital signs remained WDL during and post procedure. Patient and family member aware of next appointment. All questions verbalized and answered appropriately. Discharged via wheelchair from Infusion with family member.

## 2017-07-06 LAB — GBM IGG SER-ACNC: 21 UNITS (ref 0–20)

## 2017-07-08 ENCOUNTER — HOSPITAL ENCOUNTER (OUTPATIENT)
Dept: INFUSION THERAPY | Age: 57
Discharge: HOME | End: 2017-07-08
Payer: MEDICARE

## 2017-07-08 VITALS
SYSTOLIC BLOOD PRESSURE: 102 MMHG | HEART RATE: 73 BPM | TEMPERATURE: 97.8 F | RESPIRATION RATE: 18 BRPM | OXYGEN SATURATION: 99 % | BODY MASS INDEX: 24.31 KG/M2 | WEIGHT: 155.2 LBS | DIASTOLIC BLOOD PRESSURE: 66 MMHG

## 2017-07-08 DIAGNOSIS — M31.0 GOODPASTURE SYNDROME (HCC): ICD-10-CM

## 2017-07-08 LAB
ALBUMIN SERPL BCP-MCNC: 4.4 G/DL (ref 3.5–5)
ALBUMIN/GLOB SERPL: 2.2 {RATIO} (ref 1.2–3.5)
ALP SERPL-CCNC: 66 U/L (ref 50–136)
ALT SERPL-CCNC: 25 U/L (ref 12–65)
ANION GAP BLD CALC-SCNC: 9 MMOL/L (ref 7–16)
APTT PPP: 34.1 SEC (ref 23.5–31.7)
AST SERPL W P-5'-P-CCNC: 26 U/L (ref 15–37)
BASOPHILS # BLD AUTO: 0.1 K/UL (ref 0–0.2)
BASOPHILS # BLD: 1 % (ref 0–2)
BILIRUB SERPL-MCNC: 0.3 MG/DL (ref 0.2–1.1)
BUN SERPL-MCNC: 6 MG/DL (ref 6–23)
CALCIUM SERPL-MCNC: 8.9 MG/DL (ref 8.3–10.4)
CHLORIDE SERPL-SCNC: 107 MMOL/L (ref 98–107)
CO2 SERPL-SCNC: 28 MMOL/L (ref 21–32)
CREAT SERPL-MCNC: 0.85 MG/DL (ref 0.8–1.5)
DIFFERENTIAL METHOD BLD: ABNORMAL
EOSINOPHIL # BLD: 0.3 K/UL (ref 0–0.8)
EOSINOPHIL NFR BLD: 5 % (ref 0.5–7.8)
ERYTHROCYTE [DISTWIDTH] IN BLOOD BY AUTOMATED COUNT: 22.6 % (ref 11.9–14.6)
GLOBULIN SER CALC-MCNC: 2 G/DL (ref 2.3–3.5)
GLUCOSE SERPL-MCNC: 77 MG/DL (ref 65–100)
HCT VFR BLD AUTO: 34.2 % (ref 41.1–50.3)
HGB BLD-MCNC: 11 G/DL (ref 13.6–17.2)
IMM GRANULOCYTES # BLD: 0 K/UL (ref 0–0.5)
IMM GRANULOCYTES NFR BLD AUTO: 0.2 % (ref 0–5)
LYMPHOCYTES # BLD AUTO: 57 % (ref 13–44)
LYMPHOCYTES # BLD: 3.3 K/UL (ref 0.5–4.6)
MAGNESIUM SERPL-MCNC: 1.8 MG/DL (ref 1.8–2.4)
MCH RBC QN AUTO: 27.4 PG (ref 26.1–32.9)
MCHC RBC AUTO-ENTMCNC: 32.2 G/DL (ref 31.4–35)
MCV RBC AUTO: 85.1 FL (ref 79.6–97.8)
MONOCYTES # BLD: 0.5 K/UL (ref 0.1–1.3)
MONOCYTES NFR BLD AUTO: 9 % (ref 4–12)
NEUTS SEG # BLD: 1.6 K/UL (ref 1.7–8.2)
NEUTS SEG NFR BLD AUTO: 28 % (ref 43–78)
PLATELET # BLD AUTO: 234 K/UL (ref 150–450)
PMV BLD AUTO: 8.4 FL (ref 10.8–14.1)
POTASSIUM SERPL-SCNC: 3.6 MMOL/L (ref 3.5–5.1)
PROT SERPL-MCNC: 6.4 G/DL (ref 6.3–8.2)
RBC # BLD AUTO: 4.02 M/UL (ref 4.23–5.67)
SODIUM SERPL-SCNC: 144 MMOL/L (ref 136–145)
WBC # BLD AUTO: 5.8 K/UL (ref 4.3–11.1)

## 2017-07-08 PROCEDURE — 36514 APHERESIS PLASMA: CPT

## 2017-07-08 PROCEDURE — 80053 COMPREHEN METABOLIC PANEL: CPT | Performed by: INTERNAL MEDICINE

## 2017-07-08 PROCEDURE — 74011250636 HC RX REV CODE- 250/636: Performed by: INTERNAL MEDICINE

## 2017-07-08 PROCEDURE — P9045 ALBUMIN (HUMAN), 5%, 250 ML: HCPCS | Performed by: INTERNAL MEDICINE

## 2017-07-08 PROCEDURE — 83735 ASSAY OF MAGNESIUM: CPT | Performed by: INTERNAL MEDICINE

## 2017-07-08 PROCEDURE — 85730 THROMBOPLASTIN TIME PARTIAL: CPT | Performed by: INTERNAL MEDICINE

## 2017-07-08 PROCEDURE — 85025 COMPLETE CBC W/AUTO DIFF WBC: CPT | Performed by: INTERNAL MEDICINE

## 2017-07-08 RX ORDER — SODIUM CHLORIDE 0.9 % (FLUSH) 0.9 %
10-40 SYRINGE (ML) INJECTION AS NEEDED
Status: DISCONTINUED | OUTPATIENT
Start: 2017-07-08 | End: 2017-07-12 | Stop reason: HOSPADM

## 2017-07-08 RX ORDER — ALBUMIN HUMAN 50 G/1000ML
175 SOLUTION INTRAVENOUS ONCE
Status: COMPLETED | OUTPATIENT
Start: 2017-07-08 | End: 2017-07-08

## 2017-07-08 RX ORDER — MAGNESIUM SULFATE HEPTAHYDRATE 40 MG/ML
4 INJECTION, SOLUTION INTRAVENOUS ONCE
Status: DISCONTINUED | OUTPATIENT
Start: 2017-07-08 | End: 2017-07-08 | Stop reason: SDUPTHER

## 2017-07-08 RX ADMIN — Medication 20 ML: at 11:20

## 2017-07-08 RX ADMIN — SODIUM CHLORIDE 500 ML: 900 INJECTION, SOLUTION INTRAVENOUS at 09:00

## 2017-07-08 RX ADMIN — CALCIUM GLUCONATE 4 G: 94 INJECTION, SOLUTION INTRAVENOUS at 09:45

## 2017-07-08 RX ADMIN — ALBUMIN (HUMAN) 175 G: 12.5 INJECTION, SOLUTION INTRAVENOUS at 09:52

## 2017-07-08 NOTE — PROGRESS NOTES
Procedure: TPE  Dx: Radha Ang Syndrome  Day: 11   Labs drawn: see [facility]  Calcium used: 4 grams  Replacement product: Albumin  Replace volume: 3448  /  Remove volume: 3704  TBV processed: 100%  Other parameters: TPV 1.2  Issues: pt was given a 500 cc bolus because pt seems to tolerate procedure better with bolus. 4 grams of Calcium was ordered due to symptoms with last treatment. Site to Apheresis catheter is a dark pink. Ron NP visualized. Instructed pt to call if site becomes painful, starts having drainage, becomes febrile, becomes hot, or any other s/sx of infection. Pt verbalized understanding. Next procedure date: 7/11    Pt tolerated procedure well. D/cd via w/c.

## 2017-07-11 ENCOUNTER — HOSPITAL ENCOUNTER (OUTPATIENT)
Dept: INFUSION THERAPY | Age: 57
Discharge: HOME | End: 2017-07-11
Payer: MEDICARE

## 2017-07-11 ENCOUNTER — HOSPITAL ENCOUNTER (OUTPATIENT)
Dept: LAB | Age: 57
Discharge: HOME | End: 2017-07-11
Payer: MEDICARE

## 2017-07-11 VITALS
HEART RATE: 62 BPM | RESPIRATION RATE: 18 BRPM | OXYGEN SATURATION: 100 % | SYSTOLIC BLOOD PRESSURE: 114 MMHG | DIASTOLIC BLOOD PRESSURE: 73 MMHG

## 2017-07-11 DIAGNOSIS — M31.0 GOODPASTURE SYNDROME (HCC): ICD-10-CM

## 2017-07-11 LAB
ALBUMIN SERPL BCP-MCNC: 4.2 G/DL (ref 3.5–5)
ALBUMIN/GLOB SERPL: 2 {RATIO} (ref 1.2–3.5)
ALP SERPL-CCNC: 62 U/L (ref 50–136)
ALT SERPL-CCNC: 19 U/L (ref 12–65)
ANION GAP BLD CALC-SCNC: 6 MMOL/L (ref 7–16)
APTT PPP: 30.6 SEC (ref 23.5–31.7)
AST SERPL W P-5'-P-CCNC: 19 U/L (ref 15–37)
BASOPHILS # BLD AUTO: 0.1 K/UL (ref 0–0.2)
BASOPHILS # BLD: 1 % (ref 0–2)
BILIRUB SERPL-MCNC: 0.4 MG/DL (ref 0.2–1.1)
BUN SERPL-MCNC: 10 MG/DL (ref 6–23)
CALCIUM SERPL-MCNC: 8.8 MG/DL (ref 8.3–10.4)
CHLORIDE SERPL-SCNC: 109 MMOL/L (ref 98–107)
CO2 SERPL-SCNC: 29 MMOL/L (ref 21–32)
CREAT SERPL-MCNC: 0.9 MG/DL (ref 0.8–1.5)
DIFFERENTIAL METHOD BLD: ABNORMAL
EOSINOPHIL # BLD: 0 K/UL (ref 0–0.8)
EOSINOPHIL NFR BLD: 0 % (ref 0.5–7.8)
ERYTHROCYTE [DISTWIDTH] IN BLOOD BY AUTOMATED COUNT: 22.5 % (ref 11.9–14.6)
FIBRINOGEN PPP-MCNC: 168 MG/DL (ref 172–437)
GLOBULIN SER CALC-MCNC: 2.1 G/DL (ref 2.3–3.5)
GLUCOSE SERPL-MCNC: 85 MG/DL (ref 65–100)
HCT VFR BLD AUTO: 32.3 % (ref 41.1–50.3)
HGB BLD-MCNC: 10.5 G/DL (ref 13.6–17.2)
INR PPP: 1.2 (ref 0.9–1.2)
LYMPHOCYTES # BLD AUTO: 50 % (ref 13–44)
LYMPHOCYTES # BLD: 3 K/UL (ref 0.5–4.6)
MCH RBC QN AUTO: 28.2 PG (ref 26.1–32.9)
MCHC RBC AUTO-ENTMCNC: 32.5 G/DL (ref 31.4–35)
MCV RBC AUTO: 86.6 FL (ref 79.6–97.8)
MONOCYTES # BLD: 0.5 K/UL (ref 0.1–1.3)
MONOCYTES NFR BLD AUTO: 9 % (ref 4–12)
NEUTS SEG # BLD: 2.3 K/UL (ref 1.7–8.2)
NEUTS SEG NFR BLD AUTO: 40 % (ref 43–78)
NRBC # BLD: 0 K/UL (ref 0–0.2)
PLATELET # BLD AUTO: 190 K/UL (ref 150–450)
PMV BLD AUTO: 8.9 FL (ref 10.8–14.1)
POTASSIUM SERPL-SCNC: 3.5 MMOL/L (ref 3.5–5.1)
PROT SERPL-MCNC: 6.3 G/DL (ref 6.3–8.2)
PROTHROMBIN TIME: 12.4 SEC (ref 9.6–12)
RBC # BLD AUTO: 3.73 M/UL (ref 4.23–5.67)
SODIUM SERPL-SCNC: 144 MMOL/L (ref 136–145)
WBC # BLD AUTO: 5.9 K/UL (ref 4.3–11.1)

## 2017-07-11 PROCEDURE — 85610 PROTHROMBIN TIME: CPT | Performed by: INTERNAL MEDICINE

## 2017-07-11 PROCEDURE — 99214 OFFICE O/P EST MOD 30 MIN: CPT

## 2017-07-11 PROCEDURE — 85384 FIBRINOGEN ACTIVITY: CPT | Performed by: INTERNAL MEDICINE

## 2017-07-11 PROCEDURE — 85730 THROMBOPLASTIN TIME PARTIAL: CPT | Performed by: INTERNAL MEDICINE

## 2017-07-11 PROCEDURE — 80053 COMPREHEN METABOLIC PANEL: CPT | Performed by: INTERNAL MEDICINE

## 2017-07-11 PROCEDURE — 85025 COMPLETE CBC W/AUTO DIFF WBC: CPT | Performed by: INTERNAL MEDICINE

## 2017-07-11 NOTE — PROGRESS NOTES
Arrived to the [facility]. Temporary apheresis line removal completed. Pressure held to site for 5 minutes, patient had very minimal bleeding post removal. Pressure dressing placed over site with gauze and antibiotic cream. Patient and sister educated on how to care for the line until site heals. Given supplies for dressing changes. Instructed to call [facility] with any questions, concerns, or signs of infection to site. Both verbalized understanding, questions answered appropriately. Patient aware of next [facility] [address] appointment on 7/25/17 at 1030. Discharged via wheelchair from Infusion with sister.

## 2017-07-25 ENCOUNTER — HOSPITAL ENCOUNTER (OUTPATIENT)
Dept: LAB | Age: 57
Discharge: HOME | End: 2017-07-25
Payer: MEDICARE

## 2017-07-25 DIAGNOSIS — M31.0 GOODPASTURE SYNDROME (HCC): ICD-10-CM

## 2017-07-25 LAB
ALBUMIN SERPL BCP-MCNC: 3.8 G/DL (ref 3.5–5)
ALBUMIN/GLOB SERPL: 1.5 {RATIO} (ref 1.2–3.5)
ALP SERPL-CCNC: 91 U/L (ref 50–136)
ALT SERPL-CCNC: 23 U/L (ref 12–65)
ANION GAP BLD CALC-SCNC: 6 MMOL/L (ref 7–16)
AST SERPL W P-5'-P-CCNC: 28 U/L (ref 15–37)
BASOPHILS # BLD AUTO: 0 K/UL (ref 0–0.2)
BASOPHILS # BLD: 1 % (ref 0–2)
BILIRUB SERPL-MCNC: 0.4 MG/DL (ref 0.2–1.1)
BUN SERPL-MCNC: 7 MG/DL (ref 6–23)
CALCIUM SERPL-MCNC: 9 MG/DL (ref 8.3–10.4)
CHLORIDE SERPL-SCNC: 108 MMOL/L (ref 98–107)
CO2 SERPL-SCNC: 27 MMOL/L (ref 21–32)
CREAT SERPL-MCNC: 0.99 MG/DL (ref 0.8–1.5)
DIFFERENTIAL METHOD BLD: ABNORMAL
EOSINOPHIL # BLD: 0 K/UL (ref 0–0.8)
EOSINOPHIL NFR BLD: 0 % (ref 0.5–7.8)
ERYTHROCYTE [DISTWIDTH] IN BLOOD BY AUTOMATED COUNT: 22.1 % (ref 11.9–14.6)
GLOBULIN SER CALC-MCNC: 2.6 G/DL (ref 2.3–3.5)
GLUCOSE SERPL-MCNC: 90 MG/DL (ref 65–100)
HCT VFR BLD AUTO: 35.8 % (ref 41.1–50.3)
HGB BLD-MCNC: 11.8 G/DL (ref 13.6–17.2)
LYMPHOCYTES # BLD AUTO: 16 % (ref 13–44)
LYMPHOCYTES # BLD: 1 K/UL (ref 0.5–4.6)
MCH RBC QN AUTO: 28.7 PG (ref 26.1–32.9)
MCHC RBC AUTO-ENTMCNC: 33 G/DL (ref 31.4–35)
MCV RBC AUTO: 87.1 FL (ref 79.6–97.8)
MONOCYTES # BLD: 0.3 K/UL (ref 0.1–1.3)
MONOCYTES NFR BLD AUTO: 5 % (ref 4–12)
NEUTS SEG # BLD: 5 K/UL (ref 1.7–8.2)
NEUTS SEG NFR BLD AUTO: 79 % (ref 43–78)
NRBC # BLD: 0 K/UL (ref 0–0.2)
PLATELET # BLD AUTO: 158 K/UL (ref 150–450)
PMV BLD AUTO: 8.7 FL (ref 10.8–14.1)
POTASSIUM SERPL-SCNC: 3.8 MMOL/L (ref 3.5–5.1)
PROT SERPL-MCNC: 6.4 G/DL (ref 6.3–8.2)
RBC # BLD AUTO: 4.11 M/UL (ref 4.23–5.67)
SODIUM SERPL-SCNC: 141 MMOL/L (ref 136–145)
WBC # BLD AUTO: 6.4 K/UL (ref 4.3–11.1)

## 2017-07-25 PROCEDURE — 36415 COLL VENOUS BLD VENIPUNCTURE: CPT | Performed by: INTERNAL MEDICINE

## 2017-07-25 PROCEDURE — 80053 COMPREHEN METABOLIC PANEL: CPT | Performed by: INTERNAL MEDICINE

## 2017-07-25 PROCEDURE — 85025 COMPLETE CBC W/AUTO DIFF WBC: CPT | Performed by: INTERNAL MEDICINE

## 2017-07-25 PROCEDURE — 83516 IMMUNOASSAY NONANTIBODY: CPT | Performed by: INTERNAL MEDICINE

## 2017-07-26 LAB — GBM IGG SER-ACNC: 34 UNITS (ref 0–20)

## 2017-08-03 LAB
ACID FAST STN SPEC: NEGATIVE
MYCOBACTERIUM SPEC QL CULT: NEGATIVE
SPECIMEN PREPARATION: NORMAL
SPECIMEN SOURCE: NORMAL

## 2017-08-31 ENCOUNTER — HOSPITAL ENCOUNTER (OUTPATIENT)
Dept: GENERAL RADIOLOGY | Age: 57
Discharge: HOME | End: 2017-08-31
Attending: INTERNAL MEDICINE
Payer: MEDICARE

## 2017-08-31 DIAGNOSIS — K22.4 ESOPHAGOSPASM: ICD-10-CM

## 2017-08-31 PROCEDURE — 74011000255 HC RX REV CODE- 255: Performed by: INTERNAL MEDICINE

## 2017-08-31 PROCEDURE — 74220 X-RAY XM ESOPHAGUS 1CNTRST: CPT

## 2017-08-31 PROCEDURE — 74011000250 HC RX REV CODE- 250: Performed by: INTERNAL MEDICINE

## 2017-08-31 RX ADMIN — BARIUM SULFATE 100 ML: 0.6 SUSPENSION ORAL at 10:20

## 2017-08-31 RX ADMIN — BARIUM SULFATE 700 MG: 700 TABLET ORAL at 10:18

## 2017-08-31 RX ADMIN — BARIUM SULFATE 135 ML: 980 POWDER, FOR SUSPENSION ORAL at 10:19

## 2017-08-31 RX ADMIN — ANTACID/ANTIFLATULENT 4 G: 380; 550; 10; 10 GRANULE, EFFERVESCENT ORAL at 10:20

## 2017-09-08 PROBLEM — E43 SEVERE PROTEIN-CALORIE MALNUTRITION (HCC): Chronic | Status: RESOLVED | Noted: 2017-06-20 | Resolved: 2017-09-08

## 2017-09-08 PROBLEM — R60.9 EDEMA: Status: RESOLVED | Noted: 2017-06-25 | Resolved: 2017-09-08

## 2017-09-08 PROBLEM — R79.82 ELEVATED C-REACTIVE PROTEIN (CRP): Status: RESOLVED | Noted: 2017-06-15 | Resolved: 2017-09-08

## 2017-09-08 PROBLEM — R09.02 HYPOXIA: Status: RESOLVED | Noted: 2017-06-29 | Resolved: 2017-09-08

## 2017-09-25 ENCOUNTER — HOSPITAL ENCOUNTER (OUTPATIENT)
Dept: GENERAL RADIOLOGY | Age: 57
Discharge: HOME | End: 2017-09-25
Payer: MEDICARE

## 2017-09-25 DIAGNOSIS — M31.0 GOODPASTURE SYNDROME (HCC): ICD-10-CM

## 2017-09-25 PROBLEM — R09.02 HYPOXIA: Status: ACTIVE | Noted: 2017-09-25

## 2017-09-25 PROCEDURE — 71020 XR CHEST PA LAT: CPT

## 2017-09-26 ENCOUNTER — HOSPITAL ENCOUNTER (OUTPATIENT)
Dept: LAB | Age: 57
Discharge: HOME | End: 2017-09-26
Payer: MEDICARE

## 2017-09-26 DIAGNOSIS — M31.0 GOODPASTURE SYNDROME (HCC): ICD-10-CM

## 2017-09-26 LAB
AFP-TM SERPL-MCNC: 1.3 NG/ML
ALBUMIN SERPL-MCNC: 3.4 G/DL (ref 3.5–5)
ALBUMIN/GLOB SERPL: 1.2 {RATIO} (ref 1.2–3.5)
ALP SERPL-CCNC: 88 U/L (ref 50–136)
ALT SERPL-CCNC: 22 U/L (ref 12–65)
ANION GAP SERPL CALC-SCNC: 9 MMOL/L (ref 7–16)
AST SERPL-CCNC: 20 U/L (ref 15–37)
BASOPHILS # BLD: 0 K/UL (ref 0–0.2)
BASOPHILS NFR BLD: 0 % (ref 0–2)
BILIRUB SERPL-MCNC: 0.4 MG/DL (ref 0.2–1.1)
BUN SERPL-MCNC: 12 MG/DL (ref 6–23)
CALCIUM SERPL-MCNC: 8.7 MG/DL (ref 8.3–10.4)
CHLORIDE SERPL-SCNC: 107 MMOL/L (ref 98–107)
CO2 SERPL-SCNC: 30 MMOL/L (ref 21–32)
CREAT SERPL-MCNC: 0.91 MG/DL (ref 0.8–1.5)
DIFFERENTIAL METHOD BLD: ABNORMAL
EOSINOPHIL # BLD: 0 K/UL (ref 0–0.8)
EOSINOPHIL NFR BLD: 0 % (ref 0.5–7.8)
ERYTHROCYTE [DISTWIDTH] IN BLOOD BY AUTOMATED COUNT: 17.8 % (ref 11.9–14.6)
GLOBULIN SER CALC-MCNC: 2.9 G/DL (ref 2.3–3.5)
GLUCOSE SERPL-MCNC: 109 MG/DL (ref 65–100)
HCT VFR BLD AUTO: 38.8 % (ref 41.1–50.3)
HGB BLD-MCNC: 13.4 G/DL (ref 13.6–17.2)
IMM GRANULOCYTES # BLD: 0 K/UL (ref 0–0.5)
IMM GRANULOCYTES NFR BLD: 0.2 % (ref 0–5)
INR PPP: 1 (ref 0.9–1.2)
LYMPHOCYTES # BLD: 0.6 K/UL (ref 0.5–4.6)
LYMPHOCYTES NFR BLD: 13 % (ref 13–44)
MCH RBC QN AUTO: 31.6 PG (ref 26.1–32.9)
MCHC RBC AUTO-ENTMCNC: 34.5 G/DL (ref 31.4–35)
MCV RBC AUTO: 91.5 FL (ref 79.6–97.8)
MONOCYTES # BLD: 0.3 K/UL (ref 0.1–1.3)
MONOCYTES NFR BLD: 7 % (ref 4–12)
NEUTS SEG # BLD: 3.6 K/UL (ref 1.7–8.2)
NEUTS SEG NFR BLD: 80 % (ref 43–78)
PLATELET # BLD AUTO: 206 K/UL (ref 150–450)
PMV BLD AUTO: 8.7 FL (ref 10.8–14.1)
POTASSIUM SERPL-SCNC: 3.6 MMOL/L (ref 3.5–5.1)
PROT SERPL-MCNC: 6.3 G/DL (ref 6.3–8.2)
PROTHROMBIN TIME: 11.3 SEC (ref 9.6–12)
RBC # BLD AUTO: 4.24 M/UL (ref 4.23–5.67)
SODIUM SERPL-SCNC: 146 MMOL/L (ref 136–145)
WBC # BLD AUTO: 4.5 K/UL (ref 4.3–11.1)

## 2017-09-26 PROCEDURE — 83516 IMMUNOASSAY NONANTIBODY: CPT | Performed by: NURSE PRACTITIONER

## 2017-09-27 LAB — GBM IGG SER-ACNC: 37 UNITS (ref 0–20)

## 2017-10-02 NOTE — PROGRESS NOTES
Dr Tiffany Jenkins, please advise on this patient with good pasteur's, you spoke with Dr. Cassy Bhatia but he is no longer seeing patient. He confirmed that he is taking cytoxan and prednisone as prescribed.   Thanks, [employer]

## 2017-10-11 ENCOUNTER — HOSPITAL ENCOUNTER (OUTPATIENT)
Dept: GENERAL RADIOLOGY | Age: 57
Discharge: HOME | End: 2017-10-11
Attending: NURSE PRACTITIONER
Payer: MEDICARE

## 2017-10-11 ENCOUNTER — HOSPITAL ENCOUNTER (OUTPATIENT)
Dept: CT IMAGING | Age: 57
Discharge: HOME | End: 2017-10-11
Attending: NURSE PRACTITIONER
Payer: MEDICARE

## 2017-10-11 DIAGNOSIS — R93.89 ABNORMAL CXR: ICD-10-CM

## 2017-10-11 DIAGNOSIS — J98.4 PULMONARY CAVITARY LESION: ICD-10-CM

## 2017-10-11 DIAGNOSIS — R91.8 GROUND GLASS OPACITY PRESENT ON IMAGING OF LUNG: ICD-10-CM

## 2017-10-11 DIAGNOSIS — M31.0 GOODPASTURE SYNDROME (HCC): ICD-10-CM

## 2017-10-11 PROCEDURE — 71020 XR CHEST PA LAT: CPT

## 2017-10-11 PROCEDURE — 71250 CT THORAX DX C-: CPT

## 2017-10-12 NOTE — PROGRESS NOTES
Dr Chuy Hunt, please review,  Looks like when can start weaning cytoxan and prednisone, please advise.   Thanks, Jorge Melissa

## 2017-10-13 NOTE — PROGRESS NOTES
Reviewed CT with Dr. Nestor Loo. Will stop cytoxan and gradually decrease prednisone. See separate note.

## 2017-11-30 ENCOUNTER — HOSPITAL ENCOUNTER (OUTPATIENT)
Dept: GENERAL RADIOLOGY | Age: 57
Discharge: HOME | End: 2017-11-30
Payer: MEDICARE

## 2017-11-30 DIAGNOSIS — J20.9 ACUTE BRONCHITIS, UNSPECIFIED ORGANISM: ICD-10-CM

## 2017-11-30 DIAGNOSIS — M31.0 GOODPASTURE SYNDROME (HCC): ICD-10-CM

## 2017-11-30 DIAGNOSIS — J98.4 PULMONARY CAVITARY LESION: ICD-10-CM

## 2017-11-30 PROCEDURE — 71020 XR CHEST PA LAT: CPT

## 2018-02-07 ENCOUNTER — HOSPITAL ENCOUNTER (OUTPATIENT)
Dept: GENERAL RADIOLOGY | Age: 58
Discharge: HOME | End: 2018-02-07
Payer: MEDICARE

## 2018-02-07 DIAGNOSIS — M31.0 GOODPASTURE SYNDROME (HCC): ICD-10-CM

## 2018-02-07 DIAGNOSIS — J98.4 PULMONARY CAVITARY LESION: ICD-10-CM

## 2018-02-07 PROCEDURE — 71046 X-RAY EXAM CHEST 2 VIEWS: CPT

## 2018-02-07 NOTE — PROGRESS NOTES
Please let him know that CXR shows some improvement, no change in plan from visit today, keep follow up appt as planned.

## 2018-02-08 NOTE — PROGRESS NOTES
I called and notified the pt of the above results and recommendations. No questions at this time.   Kenia, RRT

## 2018-02-19 ENCOUNTER — HOSPITAL ENCOUNTER (OUTPATIENT)
Dept: ULTRASOUND IMAGING | Age: 58
Discharge: HOME | End: 2018-02-19
Attending: INTERNAL MEDICINE
Payer: MEDICARE

## 2018-02-19 DIAGNOSIS — K59.03 CONSTIPATION DUE TO PAIN MEDICATION: ICD-10-CM

## 2018-02-19 PROCEDURE — 76705 ECHO EXAM OF ABDOMEN: CPT
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
## 2018-06-27 ENCOUNTER — HOSPITAL ENCOUNTER (OUTPATIENT)
Dept: CT IMAGING | Age: 58
Discharge: HOME | End: 2018-06-27
Attending: INTERNAL MEDICINE
Payer: MEDICARE

## 2018-06-27 VITALS — HEIGHT: 66 IN | WEIGHT: 175 LBS | BODY MASS INDEX: 28.12 KG/M2

## 2018-06-27 DIAGNOSIS — M31.0 GOODPASTURE SYNDROME (HCC): ICD-10-CM

## 2018-06-27 DIAGNOSIS — R06.02 SOB (SHORTNESS OF BREATH): ICD-10-CM

## 2018-06-27 PROCEDURE — 74011636320 HC RX REV CODE- 636/320: Performed by: INTERNAL MEDICINE

## 2018-06-27 PROCEDURE — 71260 CT THORAX DX C+: CPT

## 2018-06-27 PROCEDURE — 74011000258 HC RX REV CODE- 258: Performed by: INTERNAL MEDICINE

## 2018-06-27 RX ORDER — SODIUM CHLORIDE 0.9 % (FLUSH) 0.9 %
10 SYRINGE (ML) INJECTION
Status: COMPLETED | OUTPATIENT
Start: 2018-06-27 | End: 2018-06-27

## 2018-06-27 RX ADMIN — SODIUM CHLORIDE 100 ML: 900 INJECTION, SOLUTION INTRAVENOUS at 16:04

## 2018-06-27 RX ADMIN — Medication 10 ML: at 16:05

## 2018-06-27 RX ADMIN — IOPAMIDOL 80 ML: 755 INJECTION, SOLUTION INTRAVENOUS at 16:04

## 2018-08-22 PROBLEM — M06.00 SERONEGATIVE RHEUMATOID ARTHRITIS (HCC): Chronic | Status: ACTIVE | Noted: 2018-08-22

## 2018-08-28 ENCOUNTER — ANESTHESIA EVENT (OUTPATIENT)
Dept: ENDOSCOPY | Age: 58
End: 2018-08-28
Payer: MEDICARE

## 2018-08-28 RX ORDER — SODIUM CHLORIDE 0.9 % (FLUSH) 0.9 %
5-10 SYRINGE (ML) INJECTION AS NEEDED
Status: CANCELLED | OUTPATIENT
Start: 2018-08-28

## 2018-08-28 RX ORDER — SODIUM CHLORIDE, SODIUM LACTATE, POTASSIUM CHLORIDE, CALCIUM CHLORIDE 600; 310; 30; 20 MG/100ML; MG/100ML; MG/100ML; MG/100ML
100 INJECTION, SOLUTION INTRAVENOUS CONTINUOUS
Status: CANCELLED | OUTPATIENT
Start: 2018-08-28

## 2018-08-29 ENCOUNTER — HOSPITAL ENCOUNTER (OUTPATIENT)
Age: 58
Setting detail: OUTPATIENT SURGERY
Discharge: HOME | End: 2018-08-29
Attending: INTERNAL MEDICINE | Admitting: INTERNAL MEDICINE
Payer: MEDICARE

## 2018-08-29 ENCOUNTER — ANESTHESIA (OUTPATIENT)
Dept: ENDOSCOPY | Age: 58
End: 2018-08-29
Payer: MEDICARE

## 2018-08-29 VITALS
TEMPERATURE: 98 F | BODY MASS INDEX: 27.32 KG/M2 | SYSTOLIC BLOOD PRESSURE: 117 MMHG | HEIGHT: 66 IN | RESPIRATION RATE: 12 BRPM | WEIGHT: 170 LBS | OXYGEN SATURATION: 95 % | HEART RATE: 66 BPM | DIASTOLIC BLOOD PRESSURE: 78 MMHG

## 2018-08-29 PROCEDURE — 76060000032 HC ANESTHESIA 0.5 TO 1 HR: Performed by: INTERNAL MEDICINE

## 2018-08-29 PROCEDURE — 74011250636 HC RX REV CODE- 250/636

## 2018-08-29 PROCEDURE — 76040000025: Performed by: INTERNAL MEDICINE

## 2018-08-29 PROCEDURE — 74011250636 HC RX REV CODE- 250/636: Performed by: ANESTHESIOLOGY

## 2018-08-29 RX ORDER — PROPOFOL 10 MG/ML
INJECTION, EMULSION INTRAVENOUS AS NEEDED
Status: DISCONTINUED | OUTPATIENT
Start: 2018-08-29 | End: 2018-08-29 | Stop reason: HOSPADM

## 2018-08-29 RX ORDER — PROPOFOL 10 MG/ML
INJECTION, EMULSION INTRAVENOUS
Status: DISCONTINUED | OUTPATIENT
Start: 2018-08-29 | End: 2018-08-29 | Stop reason: HOSPADM

## 2018-08-29 RX ORDER — SODIUM CHLORIDE, SODIUM LACTATE, POTASSIUM CHLORIDE, CALCIUM CHLORIDE 600; 310; 30; 20 MG/100ML; MG/100ML; MG/100ML; MG/100ML
100 INJECTION, SOLUTION INTRAVENOUS CONTINUOUS
Status: DISCONTINUED | OUTPATIENT
Start: 2018-08-29 | End: 2018-08-29 | Stop reason: HOSPADM

## 2018-08-29 RX ADMIN — PROPOFOL 140 MCG/KG/MIN: 10 INJECTION, EMULSION INTRAVENOUS at 09:55

## 2018-08-29 RX ADMIN — SODIUM CHLORIDE, SODIUM LACTATE, POTASSIUM CHLORIDE, AND CALCIUM CHLORIDE 100 ML/HR: 600; 310; 30; 20 INJECTION, SOLUTION INTRAVENOUS at 09:30

## 2018-08-29 RX ADMIN — PROPOFOL 50 MG: 10 INJECTION, EMULSION INTRAVENOUS at 09:55

## 2018-08-29 NOTE — ANESTHESIA POSTPROCEDURE EVALUATION
Post-Anesthesia Evaluation and Assessment Patient: Selena Hoang Adcox MRN: [id number]  SSN: [id number] YOB: 1960  Age: 61 y.o. Sex: male Cardiovascular Function/Vital Signs Visit Vitals  /78  Pulse 66  Temp 36.7 °C (98 °F)  Resp 12  Ht 5' 6\" (1.676 m)  Wt 77.1 kg (170 lb)  SpO2 95%  BMI 27.44 kg/m2 Patient is status post total IV anesthesia anesthesia for Procedure(s): 
COLONOSCOPY/ 28. 
 
Nausea/Vomiting: None Postoperative hydration reviewed and adequate. Pain: 
Pain Scale 1: Numeric (0 - 10) (08/29/18 1052) Pain Intensity 1: 0 (08/29/18 1052) Managed Neurological Status: At baseline Mental Status and Level of Consciousness: Arousable Pulmonary Status:  
O2 Device: Room air (08/29/18 1052) Adequate oxygenation and airway patent Complications related to anesthesia: None Post-anesthesia assessment completed. No concerns. Pt doing well. Signed By: Robert Zuniga MD   
 August 29, 2018

## 2018-08-29 NOTE — PROCEDURES
COLONOSCOPY    DATE of PROCEDURE: 8/29/2018    INDICATION: Hx colon polyps    POSTPROCEDURE DIAGNOSIS:  Diverticulosis    MEDICATION:   MAC anesthesia (see anesthesia report)    INSTRUMENT: [id number]    PROCEDURE: After obtaining informed consent, the patient was placed in the left lateral position and sedated. The endoscope was advanced to the cecum where the appendiceal orifice and ileocecal valve were identified. On withdrawal, the colon was carefully visualized in a 360 degree fashion, looking between folds and proximal and distal aspect of the folds. The patient was taken to the recovery area in stable condition. FINDINGS:  Rectum: normal  Sigmoid: diverticulosis  Descending Colon: diverticulosis  Transverse Colon: normal  Ascending Colon: normal  Cecum: normal  Terminal ileum: not entered    Bowel Prep: fair  Estimated blood loss: 0-minimal   Specimens obtained during procedure: none    ASSESSMENT/PLAN:  Extensive flushing to achieve adequate visualization. Next colon in 5 years.

## 2018-08-29 NOTE — DISCHARGE INSTRUCTIONS
Gastrointestinal Colonoscopy/Flexible Sigmoidoscopy - Lower Exam Discharge Instructions  1. Call Dr. Deborah Lora at [phone] for any problems or questions. 2. Contact the doctors office for follow up appointment as directed  3. Medication may cause drowsiness for several hours, therefore, do not drive or operate machinery for remainder of the day. 4. No alcohol today. 5. Ordinarily, you may resume regular diet and activity after exam unless otherwise specified by your physician. 6. Because of air put into your colon during exam, you may experience some abdominal distension, relieved by the passage of gas, for several hours. 7. Contact your physician if you have any of the following:  a. Excessive amount of bleeding - large amount when having a bowel movement. Occasional specks of blood with bowel movement would not be unusual.  b. Severe abdominal pain  c. Fever or Chills  Any additional instructions:  REPEAT COLON IN 5 YEARS WITH EXTENDED PREP      Instructions given to Jennifer Sauceda and other family members.   Instructions given by:

## 2018-08-29 NOTE — H&P
PreProcedure H&P Update Today's Date:  8/29/2018 CC: Hx colon polyps Subjective: HPI: Hx colon polyps PMH: 
Past Medical History:  
Diagnosis Date  Anxiety and depression 8/7/2012  BPH (benign prostatic hyperplasia) 8/7/2012  Chronic pain RA \"entire body\"  Chronic shoulder pain 8/7/2012  Colon polyps  GERD (gastroesophageal reflux disease)   
 well controlled with meds  Goodpasture's syndrome ([address] 06/2017 GO --- cavitary lung lesions and infiltrates--O2- 2lpm as needed- Dr Carlos Fowler / Shira Newman  History of hepatitis C 08/07/2012  
 treated in 2008- for 11 months  HLD (hyperlipidemia) 8/7/2012  Lung abscess ([facility] [address]  Lung disease   
 cavitary lung lesions and infiltrates--O2- 2lpm as needed- Dr Carlos Fowler / Shira Newman  Psychiatric disorder   
 depression; anxiety  Seronegative rheumatoid arthritis ([facility] [address] 8/22/2018 Medications:  
Current Facility-Administered Medications Medication Dose Route Frequency  lactated Ringers infusion  100 mL/hr IntraVENous CONTINUOUS Objective:  
Vitals: 
Visit Vitals  /64  Pulse 67  Temp 98 °F (36.7 °C)  Resp 18  Ht 5' 6\" (1.676 m)  Wt 77.1 kg (170 lb)  SpO2 96%  BMI 27.44 kg/m2 Exam: 
General appearance: alert, cooperative, no distress Lungs: clear to auscultation bilaterally anteriorly Heart: regular rate and rhythm Abdomen: soft, non-tender. Bowel sounds normal. No masses, no organomegaly Data Review (Labs): No results for input(s): WBC, HGB, HCT, PLT, MCV, NA, K, CL, CO2, BUN, CREA, CA, GLU, AP, SGOT, GPT, TBIL, CBIL, ALB, TP, AML, LPSE, PTP, INR, APTT, HGBEXT, HCTEXT, PLTEXT in the last 72 hours. No lab exists for component: DBIL, INREXT Plan: Hx colon polyps  Proceed with colonoscopy

## 2018-08-29 NOTE — ANESTHESIA PREPROCEDURE EVALUATION
Anesthetic History               Review of Systems / Medical History  Patient summary reviewed and pertinent labs reviewed    Pulmonary    COPD      Shortness of breath and smoker (Quit 4 years ago)      Comments: Goodpasture syndrome s/p plasmapharesis   Neuro/Psych         Psychiatric history (Anxiety and depression)     Cardiovascular                  Exercise tolerance: <4 METS  Comments: 2017 TTE with nml EF  Denies CP  Able to preform ADLs   GI/Hepatic/Renal     GERD: well controlled  Hepatitis: type C    Liver disease     Endo/Other        Arthritis (RA)     Other Findings              Physical Exam    Airway  Mallampati: II  TM Distance: 4 - 6 cm  Neck ROM: normal range of motion   Mouth opening: Normal     Cardiovascular    Rhythm: regular  Rate: normal         Dental    Dentition: Edentulous, Full upper dentures and Full lower dentures     Pulmonary      Decreased breath sounds: bilateral           Abdominal  GI exam deferred       Other Findings            Anesthetic Plan    ASA: 3  Anesthesia type: total IV anesthesia          Induction: Intravenous  Anesthetic plan and risks discussed with: Patient and Family

## 2018-11-09 PROBLEM — J43.2 CENTRILOBULAR EMPHYSEMA (HCC): Status: ACTIVE | Noted: 2018-11-09

## 2019-08-29 ENCOUNTER — HOSPITAL ENCOUNTER (OUTPATIENT)
Dept: GENERAL RADIOLOGY | Age: 59
Discharge: HOME | End: 2019-08-29
Attending: INTERNAL MEDICINE
Payer: MEDICARE

## 2019-08-29 DIAGNOSIS — J98.4 PULMONARY CAVITARY LESION: ICD-10-CM

## 2019-08-29 PROCEDURE — 71046 X-RAY EXAM CHEST 2 VIEWS: CPT

## 2021-02-18 ENCOUNTER — TRANSCRIBE ORDER (OUTPATIENT)
Dept: SCHEDULING | Age: 61
End: 2021-02-18

## 2021-02-18 DIAGNOSIS — K74.69 OTHER CIRRHOSIS OF LIVER (HCC): Primary | ICD-10-CM

## 2021-03-04 ENCOUNTER — HOSPITAL ENCOUNTER (OUTPATIENT)
Dept: MRI IMAGING | Age: 61
Discharge: HOME | End: 2021-03-04
Attending: INTERNAL MEDICINE
Payer: MEDICARE

## 2021-03-04 DIAGNOSIS — K74.69 OTHER CIRRHOSIS OF LIVER (HCC): ICD-10-CM

## 2021-03-04 PROCEDURE — A9575 INJ GADOTERATE MEGLUMI 0.1ML: HCPCS | Performed by: INTERNAL MEDICINE

## 2021-03-04 PROCEDURE — 74183 MRI ABD W/O CNTR FLWD CNTR: CPT

## 2021-03-04 PROCEDURE — 74011000258 HC RX REV CODE- 258: Performed by: INTERNAL MEDICINE

## 2021-03-04 PROCEDURE — 74011250636 HC RX REV CODE- 250/636: Performed by: INTERNAL MEDICINE

## 2021-03-04 RX ORDER — SODIUM CHLORIDE 0.9 % (FLUSH) 0.9 %
10 SYRINGE (ML) INJECTION
Status: COMPLETED | OUTPATIENT
Start: 2021-03-04 | End: 2021-03-04

## 2021-03-04 RX ORDER — GADOTERATE MEGLUMINE 376.9 MG/ML
17 INJECTION INTRAVENOUS
Status: COMPLETED | OUTPATIENT
Start: 2021-03-04 | End: 2021-03-04

## 2021-03-04 RX ADMIN — Medication 10 ML: at 09:48

## 2021-03-04 RX ADMIN — GADOTERATE MEGLUMINE 17 ML: 376.9 INJECTION INTRAVENOUS at 09:49

## 2021-03-04 RX ADMIN — SODIUM CHLORIDE 100 ML: 900 INJECTION, SOLUTION INTRAVENOUS at 09:49

## 2021-03-26 NOTE — PROGRESS NOTES
Patient stable finishing up with plasmapheresis. Patient has tolerated tx well today. Patient relaxing in bed with no complaints at this time. Call light within reach.   Report to be given to oncoming RN 7p-7a COVID Communication Team/Event Note

## 2021-06-28 ENCOUNTER — HOSPITAL ENCOUNTER (OUTPATIENT)
Dept: GENERAL RADIOLOGY | Age: 61
Discharge: HOME | End: 2021-06-28
Payer: MEDICARE

## 2021-06-28 DIAGNOSIS — R06.02 SOB (SHORTNESS OF BREATH): ICD-10-CM

## 2021-06-28 DIAGNOSIS — J44.9 CHRONIC OBSTRUCTIVE PULMONARY DISEASE, UNSPECIFIED COPD TYPE (HCC): ICD-10-CM

## 2021-06-28 PROCEDURE — 71046 X-RAY EXAM CHEST 2 VIEWS: CPT

## 2021-08-16 ENCOUNTER — TRANSCRIBE ORDER (OUTPATIENT)
Dept: SCHEDULING | Age: 61
End: 2021-08-16

## 2021-08-16 DIAGNOSIS — K74.69 OTHER CIRRHOSIS OF LIVER (HCC): Primary | ICD-10-CM

## 2021-08-16 DIAGNOSIS — Q45.3 OTHER CONGENITAL MALFORMATIONS OF PANCREAS AND PANCREATIC DUCT: ICD-10-CM

## 2021-08-16 DIAGNOSIS — R13.12 OROPHARYNGEAL DYSPHAGIA: Primary | ICD-10-CM

## 2021-09-13 ENCOUNTER — HOSPITAL ENCOUNTER (OUTPATIENT)
Dept: GENERAL RADIOLOGY | Age: 61
Discharge: HOME | End: 2021-09-13
Attending: INTERNAL MEDICINE
Payer: MEDICARE

## 2021-09-13 ENCOUNTER — HOSPITAL ENCOUNTER (OUTPATIENT)
Dept: MRI IMAGING | Age: 61
Discharge: HOME | End: 2021-09-13
Attending: INTERNAL MEDICINE
Payer: MEDICARE

## 2021-09-13 ENCOUNTER — HOSPITAL ENCOUNTER (OUTPATIENT)
Dept: GENERAL RADIOLOGY | Age: 61
End: 2021-09-13
Attending: INTERNAL MEDICINE
Payer: MEDICARE

## 2021-09-13 DIAGNOSIS — R13.12 OROPHARYNGEAL DYSPHAGIA: ICD-10-CM

## 2021-09-13 DIAGNOSIS — Q45.3 OTHER CONGENITAL MALFORMATIONS OF PANCREAS AND PANCREATIC DUCT: ICD-10-CM

## 2021-09-13 DIAGNOSIS — K74.69 OTHER CIRRHOSIS OF LIVER (HCC): ICD-10-CM

## 2021-09-13 PROCEDURE — 74220 X-RAY XM ESOPHAGUS 1CNTRST: CPT

## 2021-09-13 PROCEDURE — 74011000250 HC RX REV CODE- 250: Performed by: INTERNAL MEDICINE

## 2021-09-13 PROCEDURE — 74181 MRI ABDOMEN W/O CONTRAST: CPT

## 2021-09-13 RX ADMIN — BARIUM SULFATE 700 MG: 700 TABLET ORAL at 13:28

## 2021-09-13 RX ADMIN — BARIUM SULFATE 300 ML: 0.6 SUSPENSION ORAL at 13:27

## 2021-09-13 RX ADMIN — BARIUM SULFATE 30 ML: 980 POWDER, FOR SUSPENSION ORAL at 13:28
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
## 2021-10-01 NOTE — PROGRESS NOTES
Unable to reach patient via phone to confirm arrival time and procedure time, requirement of ride being present, and required COVID test.

## 2021-10-04 ENCOUNTER — HOSPITAL ENCOUNTER (OUTPATIENT)
Age: 61
Setting detail: OUTPATIENT SURGERY
Discharge: HOME | End: 2021-10-04
Attending: INTERNAL MEDICINE | Admitting: INTERNAL MEDICINE
Payer: MEDICARE

## 2021-10-04 ENCOUNTER — ANESTHESIA EVENT (OUTPATIENT)
Dept: ENDOSCOPY | Age: 61
End: 2021-10-04
Payer: MEDICARE

## 2021-10-04 ENCOUNTER — ANESTHESIA (OUTPATIENT)
Dept: ENDOSCOPY | Age: 61
End: 2021-10-04
Payer: MEDICARE

## 2021-10-04 VITALS
TEMPERATURE: 98 F | OXYGEN SATURATION: 93 % | SYSTOLIC BLOOD PRESSURE: 129 MMHG | HEIGHT: 67 IN | RESPIRATION RATE: 16 BRPM | BODY MASS INDEX: 30.61 KG/M2 | WEIGHT: 195 LBS | HEART RATE: 71 BPM | DIASTOLIC BLOOD PRESSURE: 87 MMHG

## 2021-10-04 PROCEDURE — 76040000025: Performed by: INTERNAL MEDICINE

## 2021-10-04 PROCEDURE — 2709999900 HC NON-CHARGEABLE SUPPLY: Performed by: INTERNAL MEDICINE

## 2021-10-04 PROCEDURE — 74011250636 HC RX REV CODE- 250/636: Performed by: ANESTHESIOLOGY

## 2021-10-04 PROCEDURE — 74011250636 HC RX REV CODE- 250/636: Performed by: NURSE ANESTHETIST, CERTIFIED REGISTERED

## 2021-10-04 PROCEDURE — 76060000031 HC ANESTHESIA FIRST 0.5 HR: Performed by: INTERNAL MEDICINE

## 2021-10-04 RX ORDER — PROPOFOL 10 MG/ML
INJECTION, EMULSION INTRAVENOUS AS NEEDED
Status: DISCONTINUED | OUTPATIENT
Start: 2021-10-04 | End: 2021-10-04 | Stop reason: HOSPADM

## 2021-10-04 RX ORDER — SODIUM CHLORIDE, SODIUM LACTATE, POTASSIUM CHLORIDE, CALCIUM CHLORIDE 600; 310; 30; 20 MG/100ML; MG/100ML; MG/100ML; MG/100ML
INJECTION, SOLUTION INTRAVENOUS
Status: DISCONTINUED | OUTPATIENT
Start: 2021-10-04 | End: 2021-10-04 | Stop reason: HOSPADM

## 2021-10-04 RX ORDER — SODIUM CHLORIDE, SODIUM LACTATE, POTASSIUM CHLORIDE, CALCIUM CHLORIDE 600; 310; 30; 20 MG/100ML; MG/100ML; MG/100ML; MG/100ML
1000 INJECTION, SOLUTION INTRAVENOUS
Status: COMPLETED | OUTPATIENT
Start: 2021-10-04 | End: 2021-10-04

## 2021-10-04 RX ADMIN — SODIUM CHLORIDE, SODIUM LACTATE, POTASSIUM CHLORIDE, AND CALCIUM CHLORIDE: 600; 310; 30; 20 INJECTION, SOLUTION INTRAVENOUS at 09:10

## 2021-10-04 RX ADMIN — SODIUM CHLORIDE, SODIUM LACTATE, POTASSIUM CHLORIDE, AND CALCIUM CHLORIDE 1000 ML: 600; 310; 30; 20 INJECTION, SOLUTION INTRAVENOUS at 08:56

## 2021-10-04 RX ADMIN — PROPOFOL 50 MG: 10 INJECTION, EMULSION INTRAVENOUS at 09:16

## 2021-10-04 RX ADMIN — PROPOFOL 50 MG: 10 INJECTION, EMULSION INTRAVENOUS at 09:14

## 2021-10-04 NOTE — ANESTHESIA POSTPROCEDURE EVALUATION
Procedure(s):  ESOPHAGOGASTRODUODENOSCOPY (EGD)/ 31  ESOPHAGEAL DILATION. total IV anesthesia    Anesthesia Post Evaluation        Patient location during evaluation: PACU  Patient participation: complete - patient participated  Level of consciousness: awake and alert  Pain management: adequate  Airway patency: patent  Anesthetic complications: no  Cardiovascular status: acceptable  Respiratory status: acceptable  Hydration status: acceptable  Post anesthesia nausea and vomiting:  none  Final Post Anesthesia Temperature Assessment:  Normothermia (36.0-37.5 degrees C)      INITIAL Post-op Vital signs:   Vitals Value Taken Time   /87 10/04/21 0955   Temp 36.7 °C (98 °F) 10/04/21 0925   Pulse 68 10/04/21 0956   Resp 16 10/04/21 0954   SpO2 95 % 10/04/21 0956   Vitals shown include unvalidated device data.

## 2021-10-04 NOTE — ANESTHESIA PREPROCEDURE EVALUATION
Anesthetic History               Review of Systems / Medical History  Patient summary reviewed and pertinent labs reviewed    Pulmonary    COPD      Shortness of breath and smoker (Quit 4 years ago)      Comments: Goodpasture syndrome s/p plasmapharesis   Neuro/Psych         Psychiatric history (Anxiety and depression)     Cardiovascular                  Exercise tolerance: <4 METS  Comments: 2017 TTE with nml EF  Denies CP  Able to preform ADLs   GI/Hepatic/Renal     GERD: well controlled  Hepatitis: type C    Liver disease     Endo/Other        Arthritis (RA)     Other Findings              Physical Exam    Airway  Mallampati: II  TM Distance: 4 - 6 cm  Neck ROM: normal range of motion   Mouth opening: Normal     Cardiovascular    Rhythm: regular  Rate: normal         Dental    Dentition: Edentulous, Full upper dentures and Full lower dentures     Pulmonary      Decreased breath sounds: bilateral           Abdominal  GI exam deferred       Other Findings            Anesthetic Plan    ASA: 3  Anesthesia type: total IV anesthesia          Induction: Intravenous  Anesthetic plan and risks discussed with: Patient

## 2021-10-04 NOTE — PROCEDURES
ESOPHAGOGASTRODUODENOSCOPY    DATE of PROCEDURE: 10/4/2021    PT NAME: Shelly Baez Adcox     [id number]    MEDICATION:   MAC      INSTRUMENT: GIFH 190    SPECIAL PROCEDURE: 60 fr wilson  BLOOD LOSS- 0 or min. SPEC- no  IMPLANT- none    PROCEDURE:  After informed consent, the patient was placed Under anesthesia in the left lateral decubitus position. The endoscope was passed visually without difficulty. The examination was performed to the duodenum with the findings and treatments as outlined below. Patient's tolerated the procedure well. No apparent complications. ASSESSMENT:  1. Minor schtzki's ring- minimal trauma at UES and LES with dilation  2. HH- EG jnc at 35 cm  3. Mild PHG  4.  No varices    PLAN:   1. F/U 3 months    TRACY Alexis MD

## 2021-10-04 NOTE — DISCHARGE INSTRUCTIONS
Gastrointestinal Esophagogastroduodenoscopy (EGD) - Upper Exam Discharge Instructions    1. Call Dr. Valarie Lynch at [phone] for any problems or questions. 2. Contact the doctor's office for follow up appointment as directed. 3. Medication may cause drowsiness for several hours, therefore:  · Do not drive or operate machinery for remainder of the day. · No alcohol today. · Do not make any important or legal decisions for 24 hours. · Do not sign any legal documents for 24 hours. 5. Ordinarily, you may resume regular diet and activity after exam unless otherwise specified by your physician. 6. For mild soreness in your throat you may use Cepacol throat lozenges or warm salt-water gargles as needed.     Any additional instructions:

## 2022-03-18 PROBLEM — G89.29 CHRONIC SHOULDER PAIN: Status: ACTIVE | Noted: 2017-06-15

## 2022-03-18 PROBLEM — M25.519 CHRONIC SHOULDER PAIN: Status: ACTIVE | Noted: 2017-06-15

## 2022-03-18 PROBLEM — N40.0 BPH (BENIGN PROSTATIC HYPERPLASIA): Status: ACTIVE | Noted: 2017-06-16

## 2022-03-18 PROBLEM — E78.5 HLD (HYPERLIPIDEMIA): Status: ACTIVE | Noted: 2017-06-15

## 2022-03-19 PROBLEM — F41.9 ANXIETY AND DEPRESSION: Status: ACTIVE | Noted: 2017-06-15

## 2022-03-19 PROBLEM — Z86.19 HISTORY OF HEPATITIS C: Status: ACTIVE | Noted: 2017-06-15

## 2022-03-19 PROBLEM — F32.A ANXIETY AND DEPRESSION: Status: ACTIVE | Noted: 2017-06-15

## 2022-03-19 PROBLEM — K22.2 SCHATZKI'S RING: Status: ACTIVE | Noted: 2017-06-23

## 2022-03-19 PROBLEM — R93.89 ABNORMAL CXR: Status: ACTIVE | Noted: 2017-06-15

## 2022-03-19 PROBLEM — J43.2 CENTRILOBULAR EMPHYSEMA (HCC): Status: ACTIVE | Noted: 2018-11-09

## 2022-03-20 PROBLEM — J98.4 PULMONARY CAVITARY LESION: Status: ACTIVE | Noted: 2017-06-15

## 2022-03-20 PROBLEM — M06.00 SERONEGATIVE RHEUMATOID ARTHRITIS (HCC): Status: ACTIVE | Noted: 2018-08-22

## 2022-03-20 PROBLEM — R09.02 HYPOXIA: Status: ACTIVE | Noted: 2017-09-25

## 2022-03-20 PROBLEM — M31.0 GOODPASTURE SYNDROME (HCC): Status: ACTIVE | Noted: 2017-06-17

## 2022-06-06 ENCOUNTER — OFFICE VISIT (OUTPATIENT)
Dept: RHEUMATOLOGY | Age: 62
End: 2022-06-06
Payer: MEDICARE

## 2022-06-06 VITALS
HEART RATE: 68 BPM | WEIGHT: 200 LBS | BODY MASS INDEX: 31.39 KG/M2 | HEIGHT: 67 IN | SYSTOLIC BLOOD PRESSURE: 108 MMHG | DIASTOLIC BLOOD PRESSURE: 87 MMHG

## 2022-06-06 DIAGNOSIS — M06.09 RHEUMATOID ARTHRITIS, SERONEGATIVE, MULTIPLE SITES (HCC): Primary | ICD-10-CM

## 2022-06-06 DIAGNOSIS — Z79.899 LONG-TERM USE OF HIGH-RISK MEDICATION: ICD-10-CM

## 2022-06-06 PROCEDURE — G8427 DOCREV CUR MEDS BY ELIG CLIN: HCPCS | Performed by: INTERNAL MEDICINE

## 2022-06-06 PROCEDURE — G8417 CALC BMI ABV UP PARAM F/U: HCPCS | Performed by: INTERNAL MEDICINE

## 2022-06-06 PROCEDURE — 1036F TOBACCO NON-USER: CPT | Performed by: INTERNAL MEDICINE

## 2022-06-06 PROCEDURE — 99214 OFFICE O/P EST MOD 30 MIN: CPT | Performed by: INTERNAL MEDICINE

## 2022-06-06 PROCEDURE — 3017F COLORECTAL CA SCREEN DOC REV: CPT | Performed by: INTERNAL MEDICINE

## 2022-06-06 RX ORDER — LEFLUNOMIDE 10 MG/1
TABLET ORAL
Qty: 45 TABLET | Refills: 0 | Status: SHIPPED | OUTPATIENT
Start: 2022-06-06 | End: 2022-09-08 | Stop reason: SDUPTHER

## 2022-06-06 RX ORDER — ETANERCEPT 50 MG/ML
50 SOLUTION SUBCUTANEOUS
Qty: 12 ML | Refills: 0 | Status: SHIPPED | OUTPATIENT
Start: 2022-06-06 | End: 2022-09-08 | Stop reason: SDUPTHER

## 2022-06-06 ASSESSMENT — JOINT PAIN
TOTAL NUMBER OF SWOLLEN JOINTS: 4
TOTAL NUMBER OF TENDER JOINTS: 12

## 2022-06-06 NOTE — PROGRESS NOTES
Kye Summers M.D.  [address] [place] [address], [address] [facility]  Office : [phone], Fax: [phone] OFFICE VISIT NOTE  Date of Visit:  2022 2:18 PM    Patient Information:  Name:  Herminio Sue  :  1960  Age:  57 y.o. Gender:  male      Mr. Sue is here today for follow-up of seronegative rheumatoid arthritis. Last visit: 3/1/2022      History of Present Illness: On talking to the patient he states he has not had any cough, congestion and uses an inhaler as needed which helps the wheeze he has. He states he went to the urologist for a weak urine stream and is on Flomax which has helped his symptoms. His current joint complaints are as mentioned below. Since the last visit, patient is feeling \"fair\". Pain: 6/10  Location:  Bilateral shoulder pain. Some para spinal muscle pain. Some neck stiffness with pain with neck ROM. Occasional tension headaches. Some mid back and shoulder blade pain. Occasional lower back pain. Bilateral ankle swelling with no pain with no buckling with no warmth and redness. Occasional hip pain with groin pain. Quality:  Deep achy pain in the shoulders and sharp pain in his legs. Modifying Factors:  1st thing in the morning the pain is the worst.   Associated Symptoms: Intermittent pain with tingling and numbness from the shoulders to the wrists. Intermittent tingling and numbness of the right hand. No tingling, numbness or pain down the legs with constant tingling and numbness of the left foot from the arch to the toes on the under surface.     Last TB screen: 2021  TB result: Negative.     Current dose of steroids: None   How long on current dose of steroids: NA  How long on continuous steroid therapy: NA     Past DMARDs, if applicable (methotrexate, plaquenil/hydroxychloroquine, sulfasalazine, Arava/leflunomide): Plaquenil in the past- ineffective.  Currently on Arava 20mg every other day.     Past biologics, if applicable (enbrel, humira, simponi, cimzia, Kirill Bump, remicade, simponi aria, actemra, rituximab, Jhonny Cower, cosentyx): cimzia ins didn't approve it. Currently on Enbrel 50 mg subcutaneously every 7 days.      Past NSAIDs, if applicable (motrin, aleve, naproxen, advil, ibuprofen, celebrex, voltaren/diclofenac, etc.): None        Last BMD: NA  Past osteoporosis drugs, if applicable (fosamax, actonel, boniva, reclast, prolia, forteo): None     BMI:31.32     Current exercise regimen, if any: none  Current vitamin D dose: MV  Current calcium dose: MV  Fractures since last visit, if any: None        The patient otherwise has no significant interval changes in health or medical history to report.      History Reviewed:    Past Medical History  Past Medical History:   Diagnosis Date    Anxiety and depression 8/7/2012    BPH (benign prostatic hyperplasia) 8/7/2012    Chronic pain     RA \"entire body\"    Chronic shoulder pain 8/7/2012    Colon polyps     GERD (gastroesophageal reflux disease)     well controlled with meds    Goodpasture's syndrome ([facility] [address] 06/2017    GO --- cavitary lung lesions and infiltrates--O2- 2lpm as needed- Dr Radha Kwok / Alejandra Lechuga History of hepatitis C 08/07/2012    treated in 2008- for 11 months    HLD (hyperlipidemia) 8/7/2012    Lung abscess ([address]     Lung disease     cavitary lung lesions and infiltrates--O2- 2lpm as needed- Dr Radha Kwok / Alejandra Lechuga Pneumonia     Psychiatric disorder     depression; anxiety    Seronegative rheumatoid arthritis ([facility] [address] 8/22/2018       Past Surgical History  Past Surgical History:   Procedure Laterality Date    COLONOSCOPY N/A 8/29/2018    COLONOSCOPY/ 28 performed by Milana Vasquez MD at [place] [address] IR NONTUNNELED VASCULAR CATHETER  6/20/2017    IR NONTUNNELED VASCULAR CATHETER 6/20/2017 [facility]    ORTHOPEDIC SURGERY Left     bicep repair    OTHER SURGICAL HISTORY      transbronchial lung biopsy    ROTATOR CUFF REPAIR Right     ROTATOR CUFF REPAIR Right     second R rotator cuff repair    ROTATOR CUFF REPAIR Left     UPPER GASTROINTESTINAL ENDOSCOPY  10/4/2021            Family History  Family History   Problem Relation Age of Onset    Stroke Mother         aneurysm    Lung Cancer Father        Social History  Social History     Socioeconomic History    Marital status:      Spouse name: None    Number of children: None    Years of education: None    Highest education level: None   Occupational History    None   Tobacco Use    Smoking status: Former Smoker     Packs/day: 1.00     Start date: 1994     Quit date: 2014     Years since quittin.5    Smokeless tobacco: Never Used   Substance and Sexual Activity    Alcohol use: No     Alcohol/week: 0.0 standard drinks    Drug use: Yes     Types: Prescription    Sexual activity: None   Other Topics Concern    None   Social History Narrative    None     Social Determinants of Health     Financial Resource Strain:     Difficulty of Paying Living Expenses: Not on file   Food Insecurity:     Worried About Running Out of Food in the Last Year: Not on file    Gulshan of Food in the Last Year: Not on file   Transportation Needs:     Lack of Transportation (Medical): Not on file    Lack of Transportation (Non-Medical):  Not on file   Physical Activity:     Days of Exercise per Week: Not on file    Minutes of Exercise per Session: Not on file   Stress:     Feeling of Stress : Not on file   Social Connections:     Frequency of Communication with Friends and Family: Not on file    Frequency of Social Gatherings with Friends and Family: Not on file    Attends Evangelical Services: Not on file    Active Member of Clubs or Organizations: Not on file    Attends Club or Organization Meetings: Not on file    Marital Status: Not on file   Intimate Partner Violence:     Fear of Current or Ex-Partner: Not on file   Conchis Tate Emotionally Abused: Not on file    Physically Abused: Not on file    Sexually Abused: Not on file   Housing Stability:     Unable to Pay for Housing in the Last Year: Not on file    Number of Places Lived in the Last Year: Not on file    Unstable Housing in the Last Year: Not on file               Allergy:  No Known Allergies      Current Medications:  Outpatient Encounter Medications as of 6/6/2022   Medication Sig Dispense Refill    Etanercept (ENBREL SURECLICK) 50 MG/ML SOAJ Inject 50 mg into the skin every 7 days 12 mL 0    leflunomide (ARAVA) 10 MG tablet TAKE 1 TABLET BY MOUTH EVERY OTHER DAY AFTER FOOD 45 tablet 0    albuterol sulfate  (90 Base) MCG/ACT inhaler 2 puffs qid prn. Generic if available.  amphetamine-dextroamphetamine (ADDERALL) 30 MG tablet Take 30 mg by mouth 3 times daily.  baclofen (LIORESAL) 20 MG tablet TAKE 1 TABLET TWICE DAILY      diazePAM (VALIUM) 10 MG tablet Take 5 mg by mouth every 4 hours as needed.  doxazosin (CARDURA) 8 MG tablet Take 8 mg by mouth daily      Fluticasone-Salmeterol 55-14 MCG/ACT AEPB Inhale 1 puff into the lungs 2 times daily      gabapentin (NEURONTIN) 600 MG tablet Take 1,200 mg by mouth 3 times daily.  ipratropium-albuterol (DUONEB) 0.5-2.5 (3) MG/3ML SOLN nebulizer solution Inhale 3 mLs into the lungs 4 times daily      mirtazapine (REMERON) 30 MG tablet Take 30 mg by mouth      oxyCODONE (OXY-IR) 30 MG immediate release tablet Take 30 mg by mouth every 4 hours as needed.  senna-docusate (PERICOLACE) 8.6-50 MG per tablet Take 1 tablet by mouth 2 times daily as needed      tamsulosin (FLOMAX) 0.4 MG capsule Take 0.4 mg by mouth daily      [DISCONTINUED] Etanercept (ENBREL SURECLICK) 50 MG/ML SOAJ Inject 50 mg into the skin every 7 days      [DISCONTINUED] leflunomide (ARAVA) 10 MG tablet TAKE 1 TABLET BY MOUTH EVERY OTHER DAY AFTER FOOD       No facility-administered encounter medications on file as of 6/6/2022. REVIEW OF SYSTEMS: The following systems were reviewed with patient today and were negative except for the following (depicted with an \"X\"):        \"X\" General  \"X\" Head and Neck  \"X\" Heart and Breathing  \"X\" Gastrointestinal    Fever/chills   Hair loss  x Shortness of breath   Upset stomach    Falls  x Dry mouth   Coughing   Diarrhea / constipation    Wt loss   Mouth sores  x Wheezing   Heartburn    Wt gain   Ringing ears   Chest pain   Dark or bloody stools    Night sweats   Diff. swallowing   None of above   Nausea or vomiting   X None of above   None of above     X None of above                \"X\" Skin  \"X\" Neurology  \"X\" Urinary/Gyn  \"X\" Other   x Easy bruising  x Numbness/ tingling   Female problems  x Depression    Rashes   Weakness   Problems with urination   Feeling anxious    Sun sensitivity   Headaches  X None of above   Problems sleeping    None of above   None of above      None of above          Physical Exam:  Blood pressure 108/87, pulse 68, height 5' 7\" (1.702 m), weight 200 lb (90.7 kg). General:  Patient alert, cooperative and in no apparent distress. HEENT: Pupils equally reactive to light and accommodation, minimal scleral injection noted. Heart: Regular rate and rhythm, normal S1 and S2, no rubs or gallops. Lungs: Clear to auscultation bilaterally. Abdomen: Soft, right and left upper quadrant tenderness with some epigastric tenderness but no rebound or guarding. No hepatosplenomegaly noted. Skin:  No rashes. No nail abnormalities. Neurologic:  Oriented, normal speech and affect. Normal gait. Extremities: Trace nonpitting edema in bilateral lower extremities with no cyanosis or clubbing. Muskoskeletal Exam:     I examined the shoulders, elbows, wrists, MCPs, PIPs, DIPs and knees bilaterally for strength, range of motion, deformity, tenderness, swelling, and synovitis.       The findings are:         Physical Exam              Joint Exam 06/06/2022        Right  Left Glenohumeral   Tender   Tender   Wrist  Swollen Tender  Swollen Tender   PIP 2   Tender   Tender   PIP 3   Tender   Tender   PIP 4   Tender   Tender   PIP 5   Tender   Tender   Cervical Spine   Tender      Thoracic Spine   Tender      Lumbar Spine   Tender      Knee  Swollen   Swollen    Ankle  Swollen   Swollen        cJADAS 10:- 4.37     Patient otherwise has a normal joint exam without other evidence of joint tenderness, synovitis, warmth, erythema, decreased ROM, weakness or deformities. Radiology Reports Reviewed (if available):  Last 3 months  [unfilled]    Lab Reports Reviewed (if available): Last 3 months    No visits with results within 3 Month(s) from this visit. Latest known visit with results is:   Office Visit on 03/01/2022   Component Date Value Ref Range Status    Glucose 03/01/2022 79  65 - 99 mg/dL Final    BUN 03/01/2022 11  8 - 27 mg/dL Final    CREATININE 03/01/2022 1.13  0.76 - 1.27 mg/dL Final    EGFR 03/01/2022 73  >59 mL/min/1.73 Final    Comment: **In accordance with recommendations from the NKF-ASN Task force,**    [facility] has updated its eGFR calculation to the 2021 CKD-EPI    creatinine equation that estimates kidney function without a race    variable.       Bun/Cre Ratio 03/01/2022 10  10 - 24 NA Final    Sodium 03/01/2022 140  134 - 144 mmol/L Final    Potassium 03/01/2022 3.6  3.5 - 5.2 mmol/L Final    Chloride 03/01/2022 98  96 - 106 mmol/L Final    CO2 03/01/2022 21  20 - 29 mmol/L Final    Calcium 03/01/2022 9.5  8.6 - 10.2 mg/dL Final    Total Protein 03/01/2022 7.0  6.0 - 8.5 g/dL Final    Albumin 03/01/2022 4.4  3.8 - 4.8 g/dL Final    Globulin, Total 03/01/2022 2.6  1.5 - 4.5 g/dL Final    Albumin/Globulin Ratio 03/01/2022 1.7  1.2 - 2.2 NA Final    Total Bilirubin 03/01/2022 0.4  0.0 - 1.2 mg/dL Final    Alkaline Phosphatase 03/01/2022 88  44 - 121 IU/L Final    AST 03/01/2022 28  0 - 40 IU/L Final    ALT 03/01/2022 17  0 - 44 IU/L Final    CRP 03/01/2022 9  0 - 10 mg/L Final    WBC 03/01/2022 11.5* 3.4 - 10.8 x10E3/uL Final    RBC 03/01/2022 4.33  4.14 - 5.80 x10E6/uL Final    Hemoglobin 03/01/2022 12.4* 13.0 - 17.7 g/dL Final    Hematocrit 03/01/2022 37.4* 37.5 - 51.0 % Final    MCV 03/01/2022 86  79 - 97 fL Final    MCH 03/01/2022 28.6  26.6 - 33.0 pg Final    MCHC 03/01/2022 33.2  31.5 - 35.7 g/dL Final    RDW 03/01/2022 13.5  11.6 - 15.4 % Final    Platelets 41/66/9423 188  150 - 450 x10E3/uL Final    Neutrophils % 03/01/2022 74  Not Estab. % Final    Lymphocytes % 03/01/2022 15  Not Estab. % Final    Monocytes % 03/01/2022 7  Not Estab. % Final    Eosinophils % 03/01/2022 2  Not Estab. % Final    Basophils % 03/01/2022 1  Not Estab. % Final    Neutrophils Absolute 03/01/2022 8.6* 1.4 - 7.0 x10E3/uL Final    Lymphocytes Absolute 03/01/2022 1.7  0.7 - 3.1 x10E3/uL Final    Monocytes Absolute 03/01/2022 0.8  0.1 - 0.9 x10E3/uL Final    Eosinophils Absolute 03/01/2022 0.2  0.0 - 0.4 x10E3/uL Final    Basophils Absolute 03/01/2022 0.1  0.0 - 0.2 x10E3/uL Final    Immature Granulocytes 03/01/2022 1  Not Estab. % Final    GRANULOCYTE ABSOLUTE COUNT 03/01/2022 0.1  0.0 - 0.1 x10E3/uL Final         The results above were reviewed and discussed with patient. Assessment/Plan:   Ella Tee is a 57 y.o. male who presents with:     1. Rheumatoid arthritis, seronegative, multiple sites [facility]): He was instructed to remain on leflunomide 10 mg 1 pill every other day and Enbrel 50 mg 1 shot every week. Patient is aware that if he is sick requiring him to an antibiotic or antiviral drug he will need to skip administering the Enbrel until he has completed the antibiotic/antiviral course and is over the infection.  -     Etanercept (ENBREL SURECLICK) 50 MG/ML SOAJ; Inject 50 mg into the skin every 7 days  -     leflunomide (ARAVA) 10 MG tablet; TAKE 1 TABLET BY MOUTH EVERY OTHER DAY AFTER FOOD  -     C-Reactive Protein;  Future  - C-Reactive Protein    2. Long-term use of high-risk medication: If there is any noted abnormality I will keep the patient informed but if not I will review his labs with him on follow-up. -     CBC with Auto Differential; Future  -     Comprehensive Metabolic Panel; Future  -     CBC with Auto Differential  -     Comprehensive Metabolic Panel    Disease activity plan:  As stated above. Steroid management plan:  As stated above, if applicable. Pain management plan:  As stated above, if applicable. Weight management plan:  Weight loss through diet and exercise is always encouraged    Disease prognosis: Good    I appreciate the opportunity to continue to participate in the care of this patient. Follow-up and Dispositions    · Return in about 3 months (around 9/6/2022). Electronically signed by:  Junior Ravi MD      This note was dictated using dragon voice recognition software.   It has been proofread, but there may still exist voice recognition errors that the author did not detect.                --------------------------------------------------------------------------------------------------------------------------------------------------------------------------------------------------------------------------------

## 2022-06-07 LAB
ALBUMIN SERPL-MCNC: 3.6 G/DL (ref 3.2–4.6)
ALBUMIN/GLOB SERPL: 1.1 {RATIO} (ref 1.2–3.5)
ALP SERPL-CCNC: 93 U/L (ref 50–136)
ALT SERPL-CCNC: 23 U/L (ref 12–65)
ANION GAP SERPL CALC-SCNC: 10 MMOL/L (ref 7–16)
AST SERPL-CCNC: 19 U/L (ref 15–37)
BASOPHILS # BLD: 0.1 K/UL (ref 0–0.2)
BASOPHILS NFR BLD: 1 % (ref 0–2)
BILIRUB SERPL-MCNC: 0.2 MG/DL (ref 0.2–1.1)
BUN SERPL-MCNC: 9 MG/DL (ref 8–23)
CALCIUM SERPL-MCNC: 9.3 MG/DL (ref 8.3–10.4)
CHLORIDE SERPL-SCNC: 106 MMOL/L (ref 98–107)
CO2 SERPL-SCNC: 25 MMOL/L (ref 21–32)
CREAT SERPL-MCNC: 1.1 MG/DL (ref 0.8–1.5)
CRP SERPL-MCNC: <0.3 MG/DL (ref 0–0.9)
DIFFERENTIAL METHOD BLD: ABNORMAL
EOSINOPHIL # BLD: 0 K/UL (ref 0–0.8)
EOSINOPHIL NFR BLD: 0 % (ref 0.5–7.8)
ERYTHROCYTE [DISTWIDTH] IN BLOOD BY AUTOMATED COUNT: 16 % (ref 11.9–14.6)
GLOBULIN SER CALC-MCNC: 3.2 G/DL (ref 2.3–3.5)
GLUCOSE SERPL-MCNC: 99 MG/DL (ref 65–100)
HCT VFR BLD AUTO: 40 % (ref 41.1–50.3)
HGB BLD-MCNC: 12.2 G/DL (ref 13.6–17.2)
IMM GRANULOCYTES # BLD AUTO: 0.1 K/UL (ref 0–0.5)
IMM GRANULOCYTES NFR BLD AUTO: 2 % (ref 0–5)
LYMPHOCYTES # BLD: 2.8 K/UL (ref 0.5–4.6)
LYMPHOCYTES NFR BLD: 42 % (ref 13–44)
MCH RBC QN AUTO: 27.5 PG (ref 26.1–32.9)
MCHC RBC AUTO-ENTMCNC: 30.5 G/DL (ref 31.4–35)
MCV RBC AUTO: 90.3 FL (ref 79.6–97.8)
MONOCYTES # BLD: 0.6 K/UL (ref 0.1–1.3)
MONOCYTES NFR BLD: 9 % (ref 4–12)
NEUTS SEG # BLD: 3 K/UL (ref 1.7–8.2)
NEUTS SEG NFR BLD: 46 % (ref 43–78)
NRBC # BLD: 0 K/UL (ref 0–0.2)
PLATELET # BLD AUTO: 221 K/UL (ref 150–450)
PMV BLD AUTO: 9.3 FL (ref 9.4–12.3)
POTASSIUM SERPL-SCNC: 4.5 MMOL/L (ref 3.5–5.1)
PROT SERPL-MCNC: 6.8 G/DL (ref 6.3–8.2)
RBC # BLD AUTO: 4.43 M/UL (ref 4.23–5.6)
SODIUM SERPL-SCNC: 141 MMOL/L (ref 138–145)
WBC # BLD AUTO: 6.5 K/UL (ref 4.3–11.1)

## 2022-07-13 RX ORDER — ALBUTEROL SULFATE 90 UG/1
2 AEROSOL, METERED RESPIRATORY (INHALATION) EVERY 4 HOURS PRN
Qty: 1 EACH | Refills: 11 | Status: SHIPPED | OUTPATIENT
Start: 2022-07-13

## 2022-09-08 ENCOUNTER — OFFICE VISIT (OUTPATIENT)
Dept: RHEUMATOLOGY | Age: 62
End: 2022-09-08
Payer: MEDICARE

## 2022-09-08 VITALS
HEART RATE: 81 BPM | WEIGHT: 197 LBS | BODY MASS INDEX: 30.92 KG/M2 | DIASTOLIC BLOOD PRESSURE: 112 MMHG | SYSTOLIC BLOOD PRESSURE: 148 MMHG | HEIGHT: 67 IN

## 2022-09-08 DIAGNOSIS — Z11.1 SCREENING FOR TUBERCULOSIS: ICD-10-CM

## 2022-09-08 DIAGNOSIS — M06.09 RHEUMATOID ARTHRITIS, SERONEGATIVE, MULTIPLE SITES (HCC): Primary | ICD-10-CM

## 2022-09-08 DIAGNOSIS — Z79.899 LONG-TERM USE OF HIGH-RISK MEDICATION: ICD-10-CM

## 2022-09-08 LAB
BASOPHILS # BLD: 0.1 K/UL (ref 0–0.2)
BASOPHILS NFR BLD: 1 % (ref 0–2)
DIFFERENTIAL METHOD BLD: ABNORMAL
EOSINOPHIL # BLD: 0 K/UL (ref 0–0.8)
EOSINOPHIL NFR BLD: 0 % (ref 0.5–7.8)
ERYTHROCYTE [DISTWIDTH] IN BLOOD BY AUTOMATED COUNT: 15 % (ref 11.9–14.6)
HCT VFR BLD AUTO: 41.6 % (ref 41.1–50.3)
HGB BLD-MCNC: 13.3 G/DL (ref 13.6–17.2)
IMM GRANULOCYTES # BLD AUTO: 0.1 K/UL (ref 0–0.5)
IMM GRANULOCYTES NFR BLD AUTO: 2 % (ref 0–5)
LYMPHOCYTES # BLD: 2.3 K/UL (ref 0.5–4.6)
LYMPHOCYTES NFR BLD: 38 % (ref 13–44)
MCH RBC QN AUTO: 29 PG (ref 26.1–32.9)
MCHC RBC AUTO-ENTMCNC: 32 G/DL (ref 31.4–35)
MCV RBC AUTO: 90.6 FL (ref 79.6–97.8)
MONOCYTES # BLD: 0.6 K/UL (ref 0.1–1.3)
MONOCYTES NFR BLD: 10 % (ref 4–12)
NEUTS SEG # BLD: 2.9 K/UL (ref 1.7–8.2)
NEUTS SEG NFR BLD: 49 % (ref 43–78)
NRBC # BLD: 0 K/UL (ref 0–0.2)
PLATELET # BLD AUTO: 235 K/UL (ref 150–450)
PMV BLD AUTO: 8.9 FL (ref 9.4–12.3)
RBC # BLD AUTO: 4.59 M/UL (ref 4.23–5.6)
WBC # BLD AUTO: 6 K/UL (ref 4.3–11.1)

## 2022-09-08 PROCEDURE — G8427 DOCREV CUR MEDS BY ELIG CLIN: HCPCS | Performed by: INTERNAL MEDICINE

## 2022-09-08 PROCEDURE — 1036F TOBACCO NON-USER: CPT | Performed by: INTERNAL MEDICINE

## 2022-09-08 PROCEDURE — G8417 CALC BMI ABV UP PARAM F/U: HCPCS | Performed by: INTERNAL MEDICINE

## 2022-09-08 PROCEDURE — 99214 OFFICE O/P EST MOD 30 MIN: CPT | Performed by: INTERNAL MEDICINE

## 2022-09-08 PROCEDURE — 3017F COLORECTAL CA SCREEN DOC REV: CPT | Performed by: INTERNAL MEDICINE

## 2022-09-08 RX ORDER — LEFLUNOMIDE 10 MG/1
TABLET ORAL
Qty: 45 TABLET | Refills: 1 | Status: SHIPPED | OUTPATIENT
Start: 2022-09-08

## 2022-09-08 RX ORDER — ETANERCEPT 50 MG/ML
50 SOLUTION SUBCUTANEOUS
Qty: 12 ML | Refills: 1 | Status: SHIPPED | OUTPATIENT
Start: 2022-09-08

## 2022-09-08 ASSESSMENT — JOINT PAIN
TOTAL NUMBER OF TENDER JOINTS: 14
TOTAL NUMBER OF SWOLLEN JOINTS: 8

## 2022-09-08 ASSESSMENT — ROUTINE ASSESSMENT OF PATIENT INDEX DATA (RAPID3)
ON A SCALE OF ONE TO TEN, HOW DIFFICULT WAS IT FOR YOU TO COMPLETE THE LISTED DAILY PHYSICAL TASKS OVER THE LAST WEEK: 0.9
ON A SCALE OF ONE TO TEN, CONSIDERING ALL THE WAYS IN WHICH ILLNESS AND HEALTH CONDITIONS MAY AFFECT YOU AT THIS TIME, PLEASE INDICATE BELOW HOW YOU ARE DOING:: 7
ON A SCALE OF ONE TO TEN, HOW MUCH OF A PROBLEM HAS UNUSUAL FATIGUE OR TIREDNESS BEEN FOR YOU OVER THE PAST WEEK?: 6
ON A SCALE OF ONE TO TEN, HOW MUCH PAIN HAVE YOU HAD BECAUSE OF YOUR CONDITION OVER THE PAST WEEK?: 7
WHEN YOU AWAKENED IN THE MORNING OVER THE LAST WEEK, PLEASE INDICATE THE AMOUNT OF TIME IT TAKES UNTIL YOU ARE AS LIMBER AS YOU WILL BE FOR THE DAY: 30 MIN

## 2022-09-08 NOTE — PROGRESS NOTES
Arely Canales M.D.  [address] [place] [address], [address] [facility]  Office : [phone], Fax: [phone] OFFICE VISIT NOTE  Date of Visit:  2022 7:56 PM    Patient Information:  Name:  Digna Sue  :  1960  Age:  57 y.o. Gender:  male      Mr. Sue is here today for follow-up of seronegative rheumatoid arthritis. Last visit: 22      History of Present Illness: On talking to the patient today he states that he was treated with an antibiotic for 2 days for possible prostatitis and will continue to follow-up with the urologist in October to see if the infection has cleared. Currently he denies any  burning with urination with no odor or increased frequency of urination either. Per patient his BP was fine at his PCP's office visit last week but states that he will monitor his BP and flu with them if remains elevated. Since he last saw me he has not had the need to skip administering the Enbrel for any reason. His current joint complaints are as mentioned below. Since the last visit, patient is feeling \"fair\". Pain: 7/10  Location:  Some bilateral shoulder and neck stiffness. Some pain with neck ROM. Occasional right sided tension headaches. Some mid back and lower back pain. Bilateral wrist and PIP more than the MCP joint pain. Some swelling of the PIP joints of both the hands. Bilateral knee pain with swelling with no warmth and redness. No buckling of the knees. Bilateral ankle pain and swelling with no buckling of the ankles. Some pain on the dorsum of the left foot with swelling with no warmth and redness. Quality:  Deep achy to sharp pain. Modifying Factors:  1st thing in the morning the pain and stiffness is the worst.   Associated Symptoms: Intermittent pain with tingling and numbness from the hips to the toes. Some LE weakness. Intermittent pain with tingling and numbness from the shoulders to the fingertips.  Some UE weakness. Needs help with opening jars with some difficulty buttoning and unbuttoning. DMARD/Biologic 9/8/2022   AM Stiffness 30 min   Pain 7   Fatigue 6   MDHAQ 0.9   Patient Global Score 7   Medication Name Enbrel   Medication Name Krystin Alvarado     Last TB screen: 08/17/2021  TB result: Negative. Current dose of steroids: None   How long on current dose of steroids: NA  How long on continuous steroid therapy: NA     Past DMARDs, if applicable (methotrexate, plaquenil/hydroxychloroquine, sulfasalazine, Arava/leflunomide): Plaquenil in the past- ineffective. Currently on Arava 20mg every other day. Past biologics, if applicable (enbrel, humira, simponi, cimzia, [place], FROST, remicade, simponi aria, actemra, rituximab, Lucie Dark, stelara, cosentyx): cimzia ins didn't approve it. Currently on Enbrel 50 mg subcutaneously every 7 days. Past NSAIDs, if applicable (motrin, aleve, naproxen, advil, ibuprofen, celebrex, voltaren/diclofenac, etc.): None        Last BMD: NA  Past osteoporosis drugs, if applicable (fosamax, actonel, boniva, reclast, prolia, forteo): None     BMI:30.85     Current exercise regimen, if any: none  Current vitamin D dose: MV  Current calcium dose: MV  Fractures since last visit, if any: None    The patient otherwise has no significant interval changes in health or medical history to report.      History Reviewed:    Past Medical History  Past Medical History:   Diagnosis Date    Anxiety and depression 8/7/2012    BPH (benign prostatic hyperplasia) 8/7/2012    Chronic pain     RA \"entire body\"    Chronic shoulder pain 8/7/2012    Colon polyps     GERD (gastroesophageal reflux disease)     well controlled with meds    Goodpasture's syndrome ([facility] 06/2017    GO --- cavitary lung lesions and infiltrates--O2- 2lpm as needed- Dr Stephanie Quinn / [facility]    History of hepatitis C 08/07/2012    treated in 2008- for 11 months    HLD (hyperlipidemia) 8/7/2012    Lung abscess ([facility] [address]     Lung disease cavitary lung lesions and infiltrates--O2- 2lpm as needed- Dr Beth Urena / [facility]    Pneumonia     Psychiatric disorder     depression; anxiety    Seronegative rheumatoid arthritis ([address] 2018       Past Surgical History  Past Surgical History:   Procedure Laterality Date    COLONOSCOPY N/A 2018    COLONOSCOPY/ 28 performed by Annalise Crespo MD at [facility] ENDOSCOPY    IR NONTUNNELED VASCULAR CATHETER  2017    IR NONTUNNELED VASCULAR CATHETER 2017 [facility]    ORTHOPEDIC SURGERY Left     bicep repair    OTHER SURGICAL HISTORY      transbronchial lung biopsy    ROTATOR CUFF REPAIR Right     ROTATOR CUFF REPAIR Right     second R rotator cuff repair    ROTATOR CUFF REPAIR Left     UPPER GASTROINTESTINAL ENDOSCOPY  10/4/2021            Family History  Family History   Problem Relation Age of Onset    Stroke Mother         aneurysm    Lung Cancer Father        Social History  Social History     Socioeconomic History    Marital status:      Spouse name: None    Number of children: None    Years of education: None    Highest education level: None   Tobacco Use    Smoking status: Former     Packs/day: 1.00     Types: Cigarettes     Start date: 1994     Quit date: 2014     Years since quittin.7    Smokeless tobacco: Never   Substance and Sexual Activity    Alcohol use: No     Alcohol/week: 0.0 standard drinks    Drug use: Yes     Types: Prescription               Allergy:  No Known Allergies      Current Medications:  Outpatient Encounter Medications as of 2022   Medication Sig Dispense Refill    leflunomide (ARAVA) 10 MG tablet TAKE 1 TABLET BY MOUTH EVERY OTHER DAY AFTER FOOD 45 tablet 1    Etanercept (ENBREL SURECLICK) 50 MG/ML SOAJ Inject 50 mg into the skin every 7 days 12 mL 1    albuterol sulfate HFA (PROVENTIL;VENTOLIN;PROAIR) 108 (90 Base) MCG/ACT inhaler Inhale 2 puffs into the lungs every 4 hours as needed for Wheezing 1 each 11 amphetamine-dextroamphetamine (ADDERALL) 30 MG tablet Take 30 mg by mouth 3 times daily. baclofen (LIORESAL) 20 MG tablet TAKE 1 TABLET TWICE DAILY      diazePAM (VALIUM) 10 MG tablet Take 5 mg by mouth every 4 hours as needed. Fluticasone-Salmeterol 55-14 MCG/ACT AEPB Inhale 1 puff into the lungs 2 times daily      gabapentin (NEURONTIN) 600 MG tablet Take 1,200 mg by mouth 3 times daily. ipratropium-albuterol (DUONEB) 0.5-2.5 (3) MG/3ML SOLN nebulizer solution Inhale 3 mLs into the lungs 4 times daily      mirtazapine (REMERON) 30 MG tablet Take 30 mg by mouth      oxyCODONE (OXY-IR) 30 MG immediate release tablet Take 30 mg by mouth every 4 hours as needed. senna-docusate (PERICOLACE) 8.6-50 MG per tablet Take 1 tablet by mouth 2 times daily as needed      tamsulosin (FLOMAX) 0.4 MG capsule Take 0.4 mg by mouth daily      [DISCONTINUED] Etanercept (ENBREL SURECLICK) 50 MG/ML SOAJ Inject 50 mg into the skin every 7 days 12 mL 0    [DISCONTINUED] leflunomide (ARAVA) 10 MG tablet TAKE 1 TABLET BY MOUTH EVERY OTHER DAY AFTER FOOD 45 tablet 0    doxazosin (CARDURA) 8 MG tablet Take 8 mg by mouth daily       No facility-administered encounter medications on file as of 9/8/2022.            REVIEW OF SYSTEMS: The following systems were reviewed with patient today and were negative except for the following (depicted with an \"X\"):        \"X\" General  \"X\" Head and Neck  \"X\" Heart and Breathing  \"X\" Gastrointestinal    Fever/chills   Hair loss  x Shortness of breath   Upset stomach    Falls  x Dry mouth  x Coughing   Diarrhea / constipation    Wt loss   Mouth sores  x Wheezing   Heartburn    Wt gain   Ringing ears  x Chest pain   Dark or bloody stools    Night sweats   Diff. swallowing   None of above   Nausea or vomiting   X None of above   None of above     X None of above                \"X\" Skin  \"X\" Neurology  \"X\" Urinary/Gyn  \"X\" Other    Easy bruising  x Numbness/ tingling   Female problems  x Depression    Rashes   Weakness  x Problems with urination  x Feeling anxious    Sun sensitivity   Headaches   None of above  x Problems sleeping   X None of above   None of above      None of above          Physical Exam:  Blood pressure (!) 148/112, pulse 81, height 5' 7\" (1.702 m), weight 197 lb (89.4 kg). General:  Patient alert, cooperative and in no apparent distress. HEENT: Pupils equally reactive to light and accomodation, no scleral injection noted. Heart: Regular rate and rhythm, normal S1 and S2 with no rubs or gallops. Lungs: Clear to auscultation bilaterally with no audible wheeze or rhonchi. Abdomen: Soft, nontender, no hepatosplenomegaly. Skin:  No rashes. No nail abnormalities. Neurologic:  Oriented, normal speech and affect. Normal gait. Extremities:  No edema in bilateral lower extremities with no cyanosis or clubbing. Muskoskeletal Exam:     I examined the shoulders, elbows, wrists, MCPs, PIPs, DIPs and knees bilaterally for strength, range of motion, deformity, tenderness, swelling, and synovitis. The findings are:       Physical Exam              Joint Exam 09/08/2022        Right  Left   Glenohumeral   Tender   Tender   Wrist  Swollen Tender  Swollen Tender   PIP 2  Swollen Tender  Swollen Tender   PIP 3  Swollen Tender  Swollen Tender   PIP 4   Tender   Tender   PIP 5   Tender   Tender   Cervical Spine   Tender      Thoracic Spine   Tender      Lumbar Spine   Tender      Sacroiliac   Tender   Tender   Knee  Swollen Tender  Swollen Tender   Ankle   Tender   Tender   Tarsometatarsal   Tender   Tender     cJADAS 10: 4.04    Patient otherwise has a normal joint exam without other evidence of joint tenderness, synovitis, warmth, erythema, decreased ROM, weakness or deformities. Radiology Reports Reviewed (if available):  Last 3 months  [unfilled]    Lab Reports Reviewed (if available): Last 3 months    No visits with results within 3 Month(s) from this visit.    Latest Final    Comment:   Estimated GFR is calculated using the Modification of Diet in Renal Disease (MDRD) Study equation, reported for both  Americans (GFRAA) and non- Americans (GFRNA), and normalized to 1.73m2 body surface area. The physician must decide which value applies to the patient. The MDRD study equation should only be used in individuals age 25 or older. It has not been validated for the following: pregnant women, patients with serious comorbid conditions,or on certain medications, or persons with extremes of body size, muscle mass, or nutritional status. Calcium 06/06/2022 9.3  8.3 - 10.4 MG/DL Final    Total Bilirubin 06/06/2022 0.2  0.2 - 1.1 MG/DL Final    ALT 06/06/2022 23  12 - 65 U/L Final    AST 06/06/2022 19  15 - 37 U/L Final    Alk Phosphatase 06/06/2022 93  50 - 136 U/L Final    Total Protein 06/06/2022 6.8  6.3 - 8.2 g/dL Final    Albumin 06/06/2022 3.6  3.2 - 4.6 g/dL Final    Globulin 06/06/2022 3.2  2.3 - 3.5 g/dL Final    Albumin/Globulin Ratio 06/06/2022 1.1 (A) 1.2 - 3.5   Final         The results above were reviewed and discussed with patient. Assessment/Plan:   Jose F Vang is a 57 y.o. male who presents with:     Rheumatoid arthritis, seronegative, multiple sites [facility]): I did instruct him to remain on Enbrel 50 mg 1 shot to be administered to self every week and leflunomide 10 mg 1 pill every other day for now. Patient is aware that if he is sick requiring him to be on antibiotic or antiviral drug he will need to skip administering the Enbrel until he has completed the antibiotic/antiviral course and is over the infection. -     leflunomide (ARAVA) 10 MG tablet; TAKE 1 TABLET BY MOUTH EVERY OTHER DAY AFTER FOOD  -     Etanercept (ENBREL SURECLICK) 50 MG/ML SOAJ; Inject 50 mg into the skin every 7 days  -     C-Reactive Protein;  Future  -     C-Reactive Protein    Long-term use of high-risk medication: If there is any noted abnormality I will keep the patient informed but if not I will review his labs with him on follow-up. -     CBC with Auto Differential; Future  -     Comprehensive Metabolic Panel; Future  -     Comprehensive Metabolic Panel  -     CBC with Auto Differential    Screening for tuberculosis: I will keep the patient informed accordingly. -     QuantiFERON In Tube; Future  -     QuantiFERON In Tube     Disease activity plan:  As stated above. Steroid management plan:  As stated above, if applicable. Pain management plan:  As stated above, if applicable. Weight management plan:  Weight loss through diet and exercise is always encouraged    Disease prognosis: Good      I appreciate the opportunity to continue to participate in the care of this patient. Follow-up and Dispositions    Return in about 4 months (around 1/8/2023). Electronically signed by:  Roberto Comer MD      This note was dictated using dragon voice recognition software.   It has been proofread, but there may still exist voice recognition errors that the author did not detect.                --------------------------------------------------------------------------------------------------------------------------------------------------------------------------------------------------------------------------------

## 2022-09-09 LAB
ALBUMIN SERPL-MCNC: 3.7 G/DL (ref 3.2–4.6)
ALBUMIN/GLOB SERPL: 1.1 {RATIO} (ref 1.2–3.5)
ALP SERPL-CCNC: 100 U/L (ref 50–136)
ALT SERPL-CCNC: 23 U/L (ref 12–65)
ANION GAP SERPL CALC-SCNC: 6 MMOL/L (ref 4–13)
AST SERPL-CCNC: 24 U/L (ref 15–37)
BILIRUB SERPL-MCNC: 0.3 MG/DL (ref 0.2–1.1)
BUN SERPL-MCNC: 13 MG/DL (ref 8–23)
CALCIUM SERPL-MCNC: 9 MG/DL (ref 8.3–10.4)
CHLORIDE SERPL-SCNC: 106 MMOL/L (ref 101–110)
CO2 SERPL-SCNC: 28 MMOL/L (ref 21–32)
CREAT SERPL-MCNC: 1 MG/DL (ref 0.8–1.5)
CRP SERPL-MCNC: 0.7 MG/DL (ref 0–0.9)
GLOBULIN SER CALC-MCNC: 3.5 G/DL (ref 2.3–3.5)
GLUCOSE SERPL-MCNC: 89 MG/DL (ref 65–100)
POTASSIUM SERPL-SCNC: 4.2 MMOL/L (ref 3.5–5.1)
PROT SERPL-MCNC: 7.2 G/DL (ref 6.3–8.2)
SODIUM SERPL-SCNC: 140 MMOL/L (ref 138–145)

## 2022-09-12 LAB
M TB IFN-G BLD-IMP: NEGATIVE
QUANTIFERON CRITERIA: NORMAL
QUANTIFERON MITOGEN VALUE: >10 IU/ML
QUANTIFERON NIL VALUE: 0.2 IU/ML
QUANTIFERON TB1 AG: 0.19 IU/ML
QUANTIFERON TB2 AG: 0.22 IU/ML
QUANTIFERON, INCUBATION: NORMAL

## 2022-10-11 RX ORDER — IPRATROPIUM BROMIDE AND ALBUTEROL SULFATE 2.5; .5 MG/3ML; MG/3ML
SOLUTION RESPIRATORY (INHALATION)
Qty: 360 EACH | Refills: 3 | Status: SHIPPED | OUTPATIENT
Start: 2022-10-11 | End: 2023-01-09

## 2022-12-27 DIAGNOSIS — J44.9 CHRONIC OBSTRUCTIVE PULMONARY DISEASE, UNSPECIFIED COPD TYPE (HCC): Primary | ICD-10-CM

## 2022-12-30 ENCOUNTER — HOSPITAL ENCOUNTER (OUTPATIENT)
Dept: GENERAL RADIOLOGY | Age: 62
Discharge: HOME | End: 2022-12-30
Payer: MEDICARE

## 2022-12-30 ENCOUNTER — OFFICE VISIT (OUTPATIENT)
Dept: PULMONOLOGY | Age: 62
End: 2022-12-30

## 2022-12-30 VITALS
DIASTOLIC BLOOD PRESSURE: 87 MMHG | BODY MASS INDEX: 27.62 KG/M2 | OXYGEN SATURATION: 97 % | RESPIRATION RATE: 14 BRPM | SYSTOLIC BLOOD PRESSURE: 132 MMHG | HEART RATE: 64 BPM | HEIGHT: 67 IN | WEIGHT: 176 LBS | TEMPERATURE: 97.2 F

## 2022-12-30 DIAGNOSIS — J98.4 CAVITARY LUNG DISEASE: ICD-10-CM

## 2022-12-30 DIAGNOSIS — J44.9 CHRONIC OBSTRUCTIVE PULMONARY DISEASE, UNSPECIFIED COPD TYPE (HCC): ICD-10-CM

## 2022-12-30 DIAGNOSIS — J44.9 CHRONIC OBSTRUCTIVE PULMONARY DISEASE, UNSPECIFIED COPD TYPE (HCC): Primary | ICD-10-CM

## 2022-12-30 LAB
EXPIRATORY TIME: NORMAL
FEF 25-75% %PRED-PRE: NORMAL
FEF 25-75% PRED: NORMAL
FEF 25-75%-PRE: NORMAL
FEV1 %PRED-PRE: NORMAL
FEV1 PRED: NORMAL
FEV1/FVC %PRED-PRE: NORMAL
FEV1/FVC PRED: NORMAL
FEV1/FVC: 70 %
FEV1: NORMAL
FVC %PRED-PRE: NORMAL %
FVC PRED: NORMAL
FVC: NORMAL L
PEF %PRED-PRE: NORMAL
PEF PRED: NORMAL
PEF-PRE: NORMAL

## 2022-12-30 PROCEDURE — 71046 X-RAY EXAM CHEST 2 VIEWS: CPT

## 2022-12-30 NOTE — PROGRESS NOTES
Patient Name:  Anisa Sue                               YOB: 1960  MRN: [id number]                                                Office Visit 12/30/2022    ASSESSMENT AND PLAN:  (Medical Decision Making)    1. Chronic obstructive pulmonary disease, unspecified COPD type ([address]  Remains on airduo and albuterol. If coverage improves would consider Trelegy ellipta instead. Consider pulmonary rehab to help with exercise endurance. - Spirometry Without Bronchodilator; Future  - Spirometry Without Bronchodilator    2. Cavitary lung disease  Stable CXR with stable imaging and spirometry. Will reassess cpfts at next visit. No orders of the defined types were placed in this encounter. No orders of the defined types were placed in this encounter. Follow-up and Dispositions    Return in about 1 year (around 12/30/2023) for with cpfts. Mara Ramirez MD    Total time for encounter on day of encounter was 22 minutes. This time includes chart prep, review of tests/procedures, review of other provider's notes, documentation and counseling patient regarding disease process and medications. ___________________________________________________________________         ______      REASON FOR VISIT:   Chief Complaint   Patient presents with    Follow-up    COPD       HISTORY OF PRESENT ILLNESS:    Mr. Caridad Mccann is a 60 y.o. male with a PMH of  who is seen at [facility] with a former 10pkyr smoking history, emphysema, h/o cavitary lung disease due to RA, anti-GBM Abs (Goodpasture syndrome). In 2017 he was treated with plasmapheresis, cytoxan/prednisone. He is now treated by Dr. Ann Hyatt and is on Enbrel 50mg weekly, Arava 10mg every other day, without prednisone. He struggles with joint pain and with the cost of his inhalers. Today he has a CXR that was revealed stable RUL scarring.       Tobacco Use      Smoking status: Former        Packs/day: 1.00        Types: Cigarettes        Start date: 1994        Quit date: 2014        Years since quittin.0      Smokeless tobacco: Never        REVIEW OF SYSTEMS:   10 point review of systems is negative except as reported in HPI. PHYSICAL EXAM:   Vitals:    22 1312   BP: 132/87   Pulse: 64   Resp: 14   Temp: 97.2 °F (36.2 °C)   SpO2: 97%   Weight: 176 lb (79.8 kg)   Height: 5' 7\" (1.702 m)     Body mass index is 27.57 kg/m². General:   Alert, cooperative, no distress, appears stated age. Eyes/Ears/Nose:   Conjunctivae/corneas clear. PERRL. Nasal mucosa is normal.  Normal TMs and external auditory canals. Mouth/Throat:  Lips, mucosa, and tongue normal. Teeth and gums normal.        Lungs:     CTAB     Heart:   Regular rate and rhythm, S1, S2 normal, no murmur, click, rub or gallop. Abdomen:    Soft, non-tender. Extremities:  Extremities normal, atraumatic, no cyanosis or edema. Skin:  Skin color normal. No rashes or lesions     Neurologic:  A&Ox3     DIAGNOSTIC TESTS:                                                                                                                        Pulmonary Function Testing:   Date    2/15/19 12/30/22       FVC    4.3 (106%) 4.02 (96%)       FEV1    3.4 (111%) 2.82 (90%)       FEV1/FVC    0.80 0.70       FEF 25-75%         TLC         FRC         RV         DLCO           Office Spirometry Results Latest Ref Rng & Units 2022   FVC L 4.02(96)   FVC %PRED-PRE % 2.82(90)   FEV1/FVC % 70     No results found for this or any previous visit. No results found for this or any previous visit.     LABS:   Lab Results   Component Value Date/Time    WBC 6.0 2022 02:38 PM    HGB 13.3 2022 02:38 PM    HCT 41.6 2022 02:38 PM     2022 02:38 PM    CRP 0.7 2022 02:38 PM              AMBULATING OXIMETRY: 18  O2 sat  HR     Room air at Rest  99%  94bpm     Room air ambulating  100%  94bpm                       IMAGING: 6/2017----------------->6/2018                         Imaging: I performed an independent interpretation of the patient's images. CXR:   XR CHEST STANDARD TWO VW 12/30/2022    Narrative  CHEST X-RAY, 2 views. INDICATION:  COPD. TECHNIQUE: PA and lateral views. COMPARISON: None. FINDINGS:  Lungs: are clear. Lungs are mildly hyperinflated with increased retrosternal  airspace and flattened diaphragms. Chronic scarring right lung apex. Costophrenic angles: are sharp. Heart size: is normal.  Pulmonary vasculature: is unremarkable. Aorta: Unremarkable. Included portion of the upper abdomen: is unremarkable. Bones: Mild thoracic scoliosis with convexity to the right. Other: None. Impression  Negative for acute abnormality. CT Chest:   CT CHEST W CONTRAST 06/27/2018    Narrative  History: Abnormal chest x-ray    EXAM: CT chest with IV contrast    TECHNIQUE: Thin section axial CT images are obtained from the thoracic inlet  through the upper abdomen after the uneventful administration of 100 cc  Isovue-370. Radiation dose reduction techniques were used for this study. Our  CT scanners use one or all of the following: Automated exposure control,  adjustment of the mA and/or kV according to patient size, use of iterative  reconstruction. COMPARISON: 10/11/2017    FINDINGS: There is a stable cavitary lesion within the right upper lobe. There  is also scarring present at the right lung apex. There is centrilobular  emphysematous change. There is scarring present within the right middle lobe and  right lower lobe. No pleural or pericardial effusion. The central airways are  patent. There is no mediastinal, hilar, or axillary lymphadenopathy  demonstrated. No change in the appearance of the upper abdomen when compared with the prior  study. Bone window evaluation demonstrates no aggressive osseous lesions.     Impression  IMPRESSION: No significant interval change when compared with the prior exam.    Nuclear Medicine: No results found for this or any previous visit from the past 3650 days. No results found for this or any previous visit.        REFERENCE INFO:                                                                                                                            Past Medical History:   Diagnosis Date    Anxiety and depression 8/7/2012    BPH (benign prostatic hyperplasia) 8/7/2012    Chronic pain     RA \"entire body\"    Chronic shoulder pain 8/7/2012    Colon polyps     GERD (gastroesophageal reflux disease)     well controlled with meds    Goodpasture's syndrome ([facility] 06/2017    GO --- cavitary lung lesions and infiltrates--O2- 2lpm as needed- Dr Terra Toussaint / [facility]    History of hepatitis C 08/07/2012    treated in 2008- for 11 months    HLD (hyperlipidemia) 8/7/2012    Lung abscess ([facility]     Lung disease     cavitary lung lesions and infiltrates--O2- 2lpm as needed- Dr Terra Toussaint / [facility]    Pneumonia     Psychiatric disorder     depression; anxiety    Seronegative rheumatoid arthritis ([facility] 8/22/2018     No Known Allergies  Current Outpatient Medications   Medication Instructions    albuterol sulfate HFA (PROVENTIL;VENTOLIN;PROAIR) 108 (90 Base) MCG/ACT inhaler 2 puffs, Inhalation, EVERY 4 HOURS PRN    amphetamine-dextroamphetamine (ADDERALL) 30 MG tablet 30 mg, Oral, 3 TIMES DAILY    baclofen (LIORESAL) 20 MG tablet TAKE 1 TABLET TWICE DAILY    diazePAM (VALIUM) 5 mg, Oral, EVERY 4 HOURS PRN    doxazosin (CARDURA) 8 mg, Oral, DAILY    Enbrel SureClick 50 mg, SubCUTAneous, EVERY 7 DAYS    Fluticasone-Salmeterol 55-14 MCG/ACT AEPB 1 puff, Inhalation, 2 TIMES DAILY    gabapentin (NEURONTIN) 1,200 mg, Oral, 3 TIMES DAILY    ipratropium-albuterol (DUONEB) 0.5-2.5 (3) MG/3ML SOLN nebulizer solution INHALE THE CONTENTS OF 1 VIAL VIA NEBULIZER FOUR TIMES DAILY    leflunomide (ARAVA) 10 MG tablet TAKE 1 TABLET BY MOUTH EVERY OTHER DAY AFTER FOOD    mirtazapine (REMERON) 30 mg, Oral oxyCODONE (OXY-IR) 30 mg, Oral, EVERY 4 HOURS PRN    OXYGEN Inhalation    senna-docusate (PERICOLACE) 8.6-50 MG per tablet 1 tablet, Oral, 2 TIMES DAILY PRN    tamsulosin (FLOMAX) 0.4 mg, Oral, DAILY

## 2023-01-03 ENCOUNTER — TELEPHONE (OUTPATIENT)
Dept: RHEUMATOLOGY | Age: 63
End: 2023-01-03

## 2023-01-03 ENCOUNTER — OFFICE VISIT (OUTPATIENT)
Dept: RHEUMATOLOGY | Age: 63
End: 2023-01-03
Payer: MEDICARE

## 2023-01-03 VITALS
WEIGHT: 183.4 LBS | DIASTOLIC BLOOD PRESSURE: 86 MMHG | BODY MASS INDEX: 28.72 KG/M2 | HEART RATE: 91 BPM | SYSTOLIC BLOOD PRESSURE: 126 MMHG

## 2023-01-03 DIAGNOSIS — M06.09 RHEUMATOID ARTHRITIS, SERONEGATIVE, MULTIPLE SITES (HCC): ICD-10-CM

## 2023-01-03 DIAGNOSIS — Z79.899 LONG-TERM USE OF HIGH-RISK MEDICATION: ICD-10-CM

## 2023-01-03 DIAGNOSIS — M06.09 RHEUMATOID ARTHRITIS, SERONEGATIVE, MULTIPLE SITES (HCC): Primary | ICD-10-CM

## 2023-01-03 LAB
BASOPHILS # BLD: 0.1 K/UL (ref 0–0.2)
BASOPHILS NFR BLD: 1 % (ref 0–2)
DIFFERENTIAL METHOD BLD: ABNORMAL
EOSINOPHIL # BLD: 0 K/UL (ref 0–0.8)
EOSINOPHIL NFR BLD: 0 % (ref 0.5–7.8)
ERYTHROCYTE [DISTWIDTH] IN BLOOD BY AUTOMATED COUNT: 15.5 % (ref 11.9–14.6)
HCT VFR BLD AUTO: 42 % (ref 41.1–50.3)
HGB BLD-MCNC: 13.1 G/DL (ref 13.6–17.2)
IMM GRANULOCYTES # BLD AUTO: 0.1 K/UL (ref 0–0.5)
IMM GRANULOCYTES NFR BLD AUTO: 1 % (ref 0–5)
LYMPHOCYTES # BLD: 2.5 K/UL (ref 0.5–4.6)
LYMPHOCYTES NFR BLD: 30 % (ref 13–44)
MCH RBC QN AUTO: 28.3 PG (ref 26.1–32.9)
MCHC RBC AUTO-ENTMCNC: 31.2 G/DL (ref 31.4–35)
MCV RBC AUTO: 90.7 FL (ref 82–102)
MONOCYTES # BLD: 0.5 K/UL (ref 0.1–1.3)
MONOCYTES NFR BLD: 6 % (ref 4–12)
NEUTS SEG # BLD: 5.2 K/UL (ref 1.7–8.2)
NEUTS SEG NFR BLD: 62 % (ref 43–78)
NRBC # BLD: 0 K/UL (ref 0–0.2)
PLATELET # BLD AUTO: 233 K/UL (ref 150–450)
PMV BLD AUTO: 8.9 FL (ref 9.4–12.3)
RBC # BLD AUTO: 4.63 M/UL (ref 4.23–5.6)
WBC # BLD AUTO: 8.4 K/UL (ref 4.3–11.1)

## 2023-01-03 PROCEDURE — 99214 OFFICE O/P EST MOD 30 MIN: CPT | Performed by: INTERNAL MEDICINE

## 2023-01-03 RX ORDER — LEFLUNOMIDE 10 MG/1
TABLET ORAL
Qty: 45 TABLET | Refills: 1 | Status: SHIPPED | OUTPATIENT
Start: 2023-01-03

## 2023-01-03 RX ORDER — MEDROXYPROGESTERONE ACETATE 150 MG/ML
50 INJECTION, SUSPENSION INTRAMUSCULAR
Qty: 12 ML | Refills: 1 | Status: SHIPPED | OUTPATIENT
Start: 2023-01-03

## 2023-01-03 ASSESSMENT — ROUTINE ASSESSMENT OF PATIENT INDEX DATA (RAPID3)
ON A SCALE OF ONE TO TEN, HOW MUCH PAIN HAVE YOU HAD BECAUSE OF YOUR CONDITION OVER THE PAST WEEK?: 7
WHEN YOU AWAKENED IN THE MORNING OVER THE LAST WEEK, PLEASE INDICATE THE AMOUNT OF TIME IT TAKES UNTIL YOU ARE AS LIMBER AS YOU WILL BE FOR THE DAY: 30 MIN
ON A SCALE OF ONE TO TEN, HOW MUCH OF A PROBLEM HAS UNUSUAL FATIGUE OR TIREDNESS BEEN FOR YOU OVER THE PAST WEEK?: 6

## 2023-01-03 ASSESSMENT — JOINT PAIN
TOTAL NUMBER OF SWOLLEN JOINTS: 2
TOTAL NUMBER OF TENDER JOINTS: 12

## 2023-01-03 NOTE — TELEPHONE ENCOUNTER
Patient brought his application for assistance for Enbrel. Printed provider portion. Started PA online on [employer].  Response:  Authorization already on file for this request.    Faxed application today

## 2023-01-03 NOTE — PROGRESS NOTES
Bren Page M.D.  [facility] [address], [facility] Katty Fernandes  Office : [phone], Fax: [phone] OFFICE VISIT NOTE  Date of Visit:  1/3/2023 2:59 PM    Patient Information:  Name:  Salty Sue  :  1960  Age:  60 y.o. Gender:  male      Mr. Sue is here today for follow-up of RA. Last visit: 22      History of Present Illness: On talking to the patient today he states that he has not had any cough, congestion with no associated fever or chills either. Has had to skip administering the Enbrel once in October since he was on antibiotic for an URI which has since resolved. His current joint complaints are as mentioned below. Since the last visit, patient is feeling \"poor\". Pain: 7/10  Location:  Some pain and swelling of the PIP joints with pain and swelling of the wrists with no warmth and redness. Bilateral ankle pain with swelling with no warmth and redness. No buckling of the ankles. Occasional pain in the ball of the left foot. Some mid back pain. Some shoulder blade pain. Some neck stiffness with no pain with neck ROM. Occasional tension headaches. Bilateral shoulder pain. Quality:  Deep achy pain in the shoulders. Modifying Factors:  1st thing in the morning the stiffness is the worst and the pain is the worst at the end of the day. Associated Symptoms:  Has a weak  with difficulty opening jars with some difficulty buttoning and unbuttoning. Intermittent tingling and numbness with pain from the shoulders to the fingertips. Bilateral UE weakness. No tingling, numbness or pain down the legs with constant tingling and numbness of the left foot. DMARD/Biologic 1/3/2023   Diagnosis RA   AM Stiffness 30 min   Pain 7   Fatigue 6   MDHAQ -   Patient Global Score -   Medication Name Enbrel   Dose 50   Medication Name [address]   Dose 10     Last TB screen: 2022  TB result: Negative. Current dose of steroids: None   How long on current dose of steroids: NA  How long on continuous steroid therapy: NA     Past DMARDs, if applicable (methotrexate, plaquenil/hydroxychloroquine, sulfasalazine, Arava/leflunomide): Plaquenil in the past- ineffective. Currently on Arava 20mg every other day. Past biologics, if applicable (enbrel, humira, simponi, cimzia, [place], FROST, remicade, simponi aria, actemra, rituximab, [place], stelara, cosentyx): cimzia ins didn't approve it. Currently on Enbrel 50 mg subcutaneously every 7 days. Past NSAIDs, if applicable (motrin, aleve, naproxen, advil, ibuprofen, celebrex, voltaren/diclofenac, etc.): None        Last BMD: NA  Past osteoporosis drugs, if applicable (fosamax, actonel, boniva, reclast, prolia, forteo): None     BMI:30.85     Current exercise regimen, if any: none  Current vitamin D dose: MV  Current calcium dose: MV  Fractures since last visit, if any: None    The patient otherwise has no significant interval changes in health or medical history to report.      History Reviewed:    Past Medical History  Past Medical History:   Diagnosis Date    Anxiety and depression 8/7/2012    BPH (benign prostatic hyperplasia) 8/7/2012    Chronic pain     RA \"entire body\"    Chronic shoulder pain 8/7/2012    Colon polyps     GERD (gastroesophageal reflux disease)     well controlled with meds    Goodpasture's syndrome ([facility] [address] 06/2017    GO --- cavitary lung lesions and infiltrates--O2- 2lpm as needed- Dr Gely Hussein / [facility]    History of hepatitis C 08/07/2012    treated in 2008- for 11 months    HLD (hyperlipidemia) 8/7/2012    Lung abscess ([facility] [address]     Lung disease     cavitary lung lesions and infiltrates--O2- 2lpm as needed- Dr Gely Hussein / [facility]    Pneumonia     Psychiatric disorder     depression; anxiety    Seronegative rheumatoid arthritis ([facility] [address] 8/22/2018       Past Surgical History  Past Surgical History:   Procedure Laterality Date    COLONOSCOPY N/A 2018    COLONOSCOPY/ 28 performed by Deven Beal MD at [facility] ENDOSCOPY    IR NONTUNNELED VASCULAR CATHETER  2017    IR NONTUNNELED VASCULAR CATHETER 2017 [facility]    ORTHOPEDIC SURGERY Left     bicep repair    OTHER SURGICAL HISTORY      transbronchial lung biopsy    ROTATOR CUFF REPAIR Right     ROTATOR CUFF REPAIR Right     second R rotator cuff repair    ROTATOR CUFF REPAIR Left     UPPER GASTROINTESTINAL ENDOSCOPY  10/4/2021            Family History  Family History   Problem Relation Age of Onset    Stroke Mother         aneurysm    Lung Cancer Father        Social History  Social History     Socioeconomic History    Marital status:      Spouse name: None    Number of children: None    Years of education: None    Highest education level: None   Tobacco Use    Smoking status: Former     Packs/day: 1.00     Types: Cigarettes     Start date: 1994     Quit date: 2014     Years since quittin.0    Smokeless tobacco: Never   Substance and Sexual Activity    Alcohol use: No     Alcohol/week: 0.0 standard drinks    Drug use: Yes     Types: Prescription       Allergy:  No Known Allergies      Current Medications:  Outpatient Encounter Medications as of 1/3/2023   Medication Sig Dispense Refill    Etanercept (ENBREL SURECLICK) 50 MG/ML SOAJ Inject 50 mg into the skin every 7 days 12 mL 1    leflunomide (ARAVA) 10 MG tablet TAKE 1 TABLET BY MOUTH EVERY OTHER DAY AFTER FOOD 45 tablet 1    OXYGEN 2 L by Other route 2 L/min uses only at night      ipratropium-albuterol (DUONEB) 0.5-2.5 (3) MG/3ML SOLN nebulizer solution INHALE THE CONTENTS OF 1 VIAL VIA NEBULIZER FOUR TIMES DAILY 360 each 3    albuterol sulfate HFA (PROVENTIL;VENTOLIN;PROAIR) 108 (90 Base) MCG/ACT inhaler Inhale 2 puffs into the lungs every 4 hours as needed for Wheezing 1 each 11    amphetamine-dextroamphetamine (ADDERALL) 30 MG tablet Take 30 mg by mouth 3 times daily.       baclofen (LIORESAL) 20 MG tablet TAKE 1 TABLET TWICE DAILY      diazePAM (VALIUM) 10 MG tablet Take 5 mg by mouth every 4 hours as needed. Fluticasone-Salmeterol 55-14 MCG/ACT AEPB Inhale 1 puff into the lungs 2 times daily      gabapentin (NEURONTIN) 600 MG tablet Take 1,200 mg by mouth 3 times daily. mirtazapine (REMERON) 30 MG tablet Take 30 mg by mouth      oxyCODONE (OXY-IR) 30 MG immediate release tablet Take 30 mg by mouth every 4 hours as needed. senna-docusate (PERICOLACE) 8.6-50 MG per tablet Take 1 tablet by mouth 2 times daily as needed      tamsulosin (FLOMAX) 0.4 MG capsule Take 0.4 mg by mouth daily      [DISCONTINUED] leflunomide (ARAVA) 10 MG tablet TAKE 1 TABLET BY MOUTH EVERY OTHER DAY AFTER FOOD 45 tablet 1    [DISCONTINUED] Etanercept (ENBREL SURECLICK) 50 MG/ML SOAJ Inject 50 mg into the skin every 7 days 12 mL 1     No facility-administered encounter medications on file as of 1/3/2023. REVIEW OF SYSTEMS: The following systems were reviewed with patient today and were negative except for the following (depicted with an \"X\"):        \"X\" General  \"X\" Head and Neck  \"X\" Heart and Breathing  \"X\" Gastrointestinal    Fever/chills   Hair loss  x Shortness of breath   Upset stomach    Falls  x Dry mouth   Coughing  x Diarrhea / constipation   x Wt loss   Mouth sores  x Wheezing   Heartburn    Wt gain   Ringing ears   Chest pain   Dark or bloody stools    Night sweats  x Diff. swallowing   None of above   Nausea or vomiting    None of above   None of above     X None of above                \"X\" Skin  \"X\" Neurology  \"X\" Urinary/Gyn  \"X\" Other   x Easy bruising  x Numbness/ tingling   Female problems  x Depression    Rashes  x Weakness   Problems with urination  x Feeling anxious    Sun sensitivity   Headaches   None of above  x Problems sleeping    None of above   None of above      None of above          Physical Exam:  Blood pressure 126/86, pulse 91, weight 183 lb 6.4 oz (83.2 kg).   General:  Patient alert, cooperative and in no apparent distress. HEENT: Pupils equally reactive to light and accommodation, minimal scleral injection noted. Heart: Regular rate and rhythm, normal S1 and S2, no rubs or gallops. Lungs: Clear to auscultation bilaterally. Abdomen: Soft, diffuse tenderness with no hepatosplenomegaly. Skin:  No rashes. No nail abnormalities. Neurologic:  Oriented, normal speech and affect. Normal gait. Extremities:  No edema in bilateral lower extremities with no cyanosis or clubbing. Muskoskeletal Exam:     I examined the shoulders, elbows, wrists, MCPs, PIPs, DIPs and knees bilaterally for strength, range of motion, deformity, tenderness, swelling, and synovitis. The findings are:         Physical Exam              Joint Exam 01/03/2023        Right  Left   Wrist  Swollen Tender  Swollen Tender   PIP 2   Tender   Tender   PIP 3   Tender   Tender   PIP 4   Tender   Tender   PIP 5   Tender   Tender   Knee   Tender   Tender   Ankle   Tender   Tender   Tarsometatarsal   Tender   Tender     cJADAS 10:- 3.58     Patient otherwise has a normal joint exam without other evidence of joint tenderness, synovitis, warmth, erythema, decreased ROM, weakness or deformities. Radiology Reports Reviewed (if available):  Last 3 months  [unfilled]    Lab Reports Reviewed (if available): Last 3 months    Office Visit on 12/30/2022   Component Date Value Ref Range Status    FVC 12/30/2022 4.02(96)  L Final    FVC %Pred-Pre 12/30/2022 2.82(90)  % Final    FEV1/FVC 12/30/2022 70  % Final         The results above were reviewed and discussed with patient. Assessment/Plan:   Blanca Leblanc is a 60 y.o. male who presents with:     Rheumatoid arthritis, seronegative, multiple sites [facility]): Patient was instructed to remain on leflunomide 10 mg every other day and Enbrel 50 mg 1 shot every week.   He is aware that if he is sick requiring him to be on antibiotic or antiviral drug he will need to skip administering the Enbrel until he has completed the antibiotic/antiviral course and is over the infection. -     C-Reactive Protein; Future  -     Etanercept (ENBREL SURECLICK) 50 MG/ML SOAJ; Inject 50 mg into the skin every 7 days  -     leflunomide (ARAVA) 10 MG tablet; TAKE 1 TABLET BY MOUTH EVERY OTHER DAY AFTER FOOD    Long-term use of high-risk medication: If there is any noted abnormality I will keep the patient informed but if not I will review his labs with him on follow-up. -     Comprehensive Metabolic Panel; Future  -     CBC with Auto Differential; Future     Disease activity plan:  As stated above. Steroid management plan:  As stated above, if applicable. Pain management plan:  As stated above, if applicable. Weight management plan:  Weight loss through diet and exercise is always encouraged    Disease prognosis: Good    I appreciate the opportunity to continue to participate in the care of this patient. Follow-up and Dispositions    Return in about 4 months (around 5/3/2023). Electronically signed by:  Kendra Wiggins MD      This note was dictated using dragon voice recognition software.   It has been proofread, but there may still exist voice recognition errors that the author did not detect.                --------------------------------------------------------------------------------------------------------------------------------------------------------------------------------------------------------------------------------

## 2023-01-04 LAB
ALBUMIN SERPL-MCNC: 3.3 G/DL (ref 3.2–4.6)
ALBUMIN/GLOB SERPL: 0.8 (ref 0.4–1.6)
ALP SERPL-CCNC: 104 U/L (ref 50–136)
ALT SERPL-CCNC: 21 U/L (ref 12–65)
ANION GAP SERPL CALC-SCNC: 5 MMOL/L (ref 2–11)
AST SERPL-CCNC: 22 U/L (ref 15–37)
BILIRUB SERPL-MCNC: 0.6 MG/DL (ref 0.2–1.1)
BUN SERPL-MCNC: 6 MG/DL (ref 8–23)
CALCIUM SERPL-MCNC: 9.3 MG/DL (ref 8.3–10.4)
CHLORIDE SERPL-SCNC: 108 MMOL/L (ref 101–110)
CO2 SERPL-SCNC: 26 MMOL/L (ref 21–32)
CREAT SERPL-MCNC: 0.9 MG/DL (ref 0.8–1.5)
CRP SERPL-MCNC: 3.7 MG/DL (ref 0–0.9)
GLOBULIN SER CALC-MCNC: 4.1 G/DL (ref 2.8–4.5)
GLUCOSE SERPL-MCNC: 91 MG/DL (ref 65–100)
POTASSIUM SERPL-SCNC: 3.8 MMOL/L (ref 3.5–5.1)
PROT SERPL-MCNC: 7.4 G/DL (ref 6.3–8.2)
SODIUM SERPL-SCNC: 139 MMOL/L (ref 133–143)

## 2023-01-11 NOTE — TELEPHONE ENCOUNTER
Fax received from [phone]. Patient has been approved for assistance with [employer] until 12/31/23. PHONE CALL to him to let him know.  LEFT MESSAGE on his VM

## 2023-05-02 ENCOUNTER — OFFICE VISIT (OUTPATIENT)
Dept: RHEUMATOLOGY | Age: 63
End: 2023-05-02
Payer: MEDICARE

## 2023-05-02 VITALS
DIASTOLIC BLOOD PRESSURE: 88 MMHG | SYSTOLIC BLOOD PRESSURE: 124 MMHG | BODY MASS INDEX: 28.69 KG/M2 | WEIGHT: 182.8 LBS | HEART RATE: 72 BPM | HEIGHT: 67 IN

## 2023-05-02 DIAGNOSIS — M06.09 RHEUMATOID ARTHRITIS, SERONEGATIVE, MULTIPLE SITES (HCC): ICD-10-CM

## 2023-05-02 DIAGNOSIS — Z79.899 LONG-TERM USE OF HIGH-RISK MEDICATION: ICD-10-CM

## 2023-05-02 DIAGNOSIS — M06.09 RHEUMATOID ARTHRITIS, SERONEGATIVE, MULTIPLE SITES (HCC): Primary | ICD-10-CM

## 2023-05-02 LAB
BASOPHILS # BLD: 0.1 K/UL (ref 0–0.2)
BASOPHILS NFR BLD: 1 % (ref 0–2)
DIFFERENTIAL METHOD BLD: ABNORMAL
EOSINOPHIL # BLD: 0 K/UL (ref 0–0.8)
EOSINOPHIL NFR BLD: 0 % (ref 0.5–7.8)
ERYTHROCYTE [DISTWIDTH] IN BLOOD BY AUTOMATED COUNT: 14.6 % (ref 11.9–14.6)
HCT VFR BLD AUTO: 41.4 % (ref 41.1–50.3)
HGB BLD-MCNC: 13.3 G/DL (ref 13.6–17.2)
IMM GRANULOCYTES # BLD AUTO: 0.2 K/UL (ref 0–0.5)
IMM GRANULOCYTES NFR BLD AUTO: 2 % (ref 0–5)
LYMPHOCYTES # BLD: 2.3 K/UL (ref 0.5–4.6)
LYMPHOCYTES NFR BLD: 24 % (ref 13–44)
MCH RBC QN AUTO: 28.9 PG (ref 26.1–32.9)
MCHC RBC AUTO-ENTMCNC: 32.1 G/DL (ref 31.4–35)
MCV RBC AUTO: 90 FL (ref 82–102)
MONOCYTES # BLD: 0.6 K/UL (ref 0.1–1.3)
MONOCYTES NFR BLD: 7 % (ref 4–12)
NEUTS SEG # BLD: 6.2 K/UL (ref 1.7–8.2)
NEUTS SEG NFR BLD: 66 % (ref 43–78)
NRBC # BLD: 0 K/UL (ref 0–0.2)
PLATELET # BLD AUTO: 325 K/UL (ref 150–450)
PMV BLD AUTO: 8.7 FL (ref 9.4–12.3)
RBC # BLD AUTO: 4.6 M/UL (ref 4.23–5.6)
WBC # BLD AUTO: 9.4 K/UL (ref 4.3–11.1)

## 2023-05-02 PROCEDURE — 99214 OFFICE O/P EST MOD 30 MIN: CPT | Performed by: INTERNAL MEDICINE

## 2023-05-02 PROCEDURE — G8427 DOCREV CUR MEDS BY ELIG CLIN: HCPCS | Performed by: INTERNAL MEDICINE

## 2023-05-02 PROCEDURE — 1036F TOBACCO NON-USER: CPT | Performed by: INTERNAL MEDICINE

## 2023-05-02 PROCEDURE — G8417 CALC BMI ABV UP PARAM F/U: HCPCS | Performed by: INTERNAL MEDICINE

## 2023-05-02 PROCEDURE — 3017F COLORECTAL CA SCREEN DOC REV: CPT | Performed by: INTERNAL MEDICINE

## 2023-05-02 RX ORDER — LEFLUNOMIDE 10 MG/1
TABLET ORAL
Qty: 90 TABLET | Refills: 0 | Status: SHIPPED | OUTPATIENT
Start: 2023-05-02

## 2023-05-02 RX ORDER — MEDROXYPROGESTERONE ACETATE 150 MG/ML
50 INJECTION, SUSPENSION INTRAMUSCULAR
Qty: 12 ML | Refills: 0 | Status: SHIPPED | OUTPATIENT
Start: 2023-05-02

## 2023-05-02 ASSESSMENT — JOINT PAIN
TOTAL NUMBER OF TENDER JOINTS: 22
TOTAL NUMBER OF SWOLLEN JOINTS: 6

## 2023-05-02 ASSESSMENT — ROUTINE ASSESSMENT OF PATIENT INDEX DATA (RAPID3)
ON A SCALE OF ONE TO TEN, HOW MUCH OF A PROBLEM HAS UNUSUAL FATIGUE OR TIREDNESS BEEN FOR YOU OVER THE PAST WEEK?: 6
WHEN YOU AWAKENED IN THE MORNING OVER THE LAST WEEK, PLEASE INDICATE THE AMOUNT OF TIME IT TAKES UNTIL YOU ARE AS LIMBER AS YOU WILL BE FOR THE DAY: 30 MIN
ON A SCALE OF ONE TO TEN, HOW MUCH PAIN HAVE YOU HAD BECAUSE OF YOUR CONDITION OVER THE PAST WEEK?: 7
ON A SCALE OF ONE TO TEN, HOW DIFFICULT WAS IT FOR YOU TO COMPLETE THE LISTED DAILY PHYSICAL TASKS OVER THE LAST WEEK: 1.1
ON A SCALE OF ONE TO TEN, CONSIDERING ALL THE WAYS IN WHICH ILLNESS AND HEALTH CONDITIONS MAY AFFECT YOU AT THIS TIME, PLEASE INDICATE BELOW HOW YOU ARE DOING:: 7

## 2023-05-02 NOTE — PROGRESS NOTES
[facility] [address], [facility] [place] Fernandes  Office : [phone], Fax: [phone] OFFICE VISIT NOTE  Date of Visit:  2023 2:47 PM    Patient Information:  Name:  Padmaja Sue  :  1960  Age:  60 y.o. Gender:  male      Mr. Sue is here today for follow-up of RA. Last visit: 1/3/2023      History of Present Illness: On talking to the patient he states he has had some congestion with no cough but some shortness of breath on exertion. Denies any chest pain. Does complain of occasional abdominal pain with no melena or hematochezia though. Since he last saw me he has not had the need to skip administering the Enbrel for any reason. His current joint complaints are as mentioned below. Since the last visit, patient is feeling \"fair\". Pain: 7/10  Location:  Bilateral shoulder pain with some lower back pain. No shoulder blade pain. Some neck stiffness with some pain with neck ROM. Occasional tension headaches. Some para spinal muscle pain. Bilateral wrist pain with no swelling with no warmth and redness. Some pain and swelling of the MCP and PIP joints. Bilateral knee swelling with no pain with no buckling with no warmth and redness. Quality:  Dull deep achy pain. Modifying Factors:  1st thing in the morning the pain and stiffness is the worst.   Associated Symptoms: Needs help with opening jars with some difficulty buttoning and unbuttoning. Positional tingling and numbness with no pain down the arms from the shoulders to the fingertips. Occasional pain, tingling and numbness from the hips to the toes down both the legs. DMARD/Biologic 2023   Diagnosis -   AM Stiffness 30 min   Pain 7   Fatigue 6   MDHAQ 1.1   Patient Global Score 7   Medication Name [address]   Dose -   Medication Name Enbrel   Dose -     Last TB screen: 2022   TB result: Negative.      Current dose of

## 2023-05-03 LAB
ALBUMIN SERPL-MCNC: 3.3 G/DL (ref 3.2–4.6)
ALBUMIN/GLOB SERPL: 0.8 (ref 0.4–1.6)
ALP SERPL-CCNC: 109 U/L (ref 50–136)
ALT SERPL-CCNC: 20 U/L (ref 12–65)
ANION GAP SERPL CALC-SCNC: 5 MMOL/L (ref 2–11)
AST SERPL-CCNC: 25 U/L (ref 15–37)
BILIRUB SERPL-MCNC: 0.3 MG/DL (ref 0.2–1.1)
BUN SERPL-MCNC: 9 MG/DL (ref 8–23)
CALCIUM SERPL-MCNC: 9.3 MG/DL (ref 8.3–10.4)
CHLORIDE SERPL-SCNC: 109 MMOL/L (ref 101–110)
CO2 SERPL-SCNC: 26 MMOL/L (ref 21–32)
CREAT SERPL-MCNC: 1 MG/DL (ref 0.8–1.5)
CRP SERPL-MCNC: 1.8 MG/DL (ref 0–0.9)
GLOBULIN SER CALC-MCNC: 4.1 G/DL (ref 2.8–4.5)
GLUCOSE SERPL-MCNC: 85 MG/DL (ref 65–100)
POTASSIUM SERPL-SCNC: 4.3 MMOL/L (ref 3.5–5.1)
PROT SERPL-MCNC: 7.4 G/DL (ref 6.3–8.2)
SODIUM SERPL-SCNC: 140 MMOL/L (ref 133–143)

## 2023-07-12 ENCOUNTER — OFFICE VISIT (OUTPATIENT)
Dept: PULMONOLOGY | Age: 63
End: 2023-07-12
Payer: MEDICARE

## 2023-07-12 VITALS
HEIGHT: 67 IN | BODY MASS INDEX: 26.84 KG/M2 | WEIGHT: 171 LBS | HEART RATE: 77 BPM | OXYGEN SATURATION: 95 % | RESPIRATION RATE: 18 BRPM | DIASTOLIC BLOOD PRESSURE: 84 MMHG | SYSTOLIC BLOOD PRESSURE: 126 MMHG | TEMPERATURE: 98.6 F

## 2023-07-12 DIAGNOSIS — J98.4 CAVITARY LUNG DISEASE: ICD-10-CM

## 2023-07-12 DIAGNOSIS — J43.2 CENTRILOBULAR EMPHYSEMA (HCC): ICD-10-CM

## 2023-07-12 DIAGNOSIS — R06.09 DYSPNEA ON EXERTION: Primary | ICD-10-CM

## 2023-07-12 PROCEDURE — 3023F SPIROM DOC REV: CPT | Performed by: INTERNAL MEDICINE

## 2023-07-12 PROCEDURE — 99214 OFFICE O/P EST MOD 30 MIN: CPT | Performed by: INTERNAL MEDICINE

## 2023-07-12 PROCEDURE — G8417 CALC BMI ABV UP PARAM F/U: HCPCS | Performed by: INTERNAL MEDICINE

## 2023-07-12 PROCEDURE — 1036F TOBACCO NON-USER: CPT | Performed by: INTERNAL MEDICINE

## 2023-07-12 PROCEDURE — G8427 DOCREV CUR MEDS BY ELIG CLIN: HCPCS | Performed by: INTERNAL MEDICINE

## 2023-07-12 PROCEDURE — 3017F COLORECTAL CA SCREEN DOC REV: CPT | Performed by: INTERNAL MEDICINE

## 2023-07-12 RX ORDER — ALBUTEROL SULFATE 90 UG/1
2 AEROSOL, METERED RESPIRATORY (INHALATION) EVERY 6 HOURS PRN
Qty: 3 EACH | Refills: 3 | Status: SHIPPED | OUTPATIENT
Start: 2023-07-12

## 2023-07-12 NOTE — PATIENT INSTRUCTIONS
We plan to get some breathing tests to see how we can help more with your breathing. Pulmonary rehab may be a reasonable option depending on the results of the tests.     Here are some home exercise you can work on

## 2023-07-13 ENCOUNTER — OFFICE VISIT (OUTPATIENT)
Dept: RHEUMATOLOGY | Age: 63
End: 2023-07-13
Payer: MEDICARE

## 2023-07-13 VITALS
HEIGHT: 67 IN | SYSTOLIC BLOOD PRESSURE: 125 MMHG | HEART RATE: 76 BPM | DIASTOLIC BLOOD PRESSURE: 96 MMHG | BODY MASS INDEX: 26.53 KG/M2 | WEIGHT: 169 LBS

## 2023-07-13 DIAGNOSIS — M06.09 RHEUMATOID ARTHRITIS, SERONEGATIVE, MULTIPLE SITES (HCC): ICD-10-CM

## 2023-07-13 DIAGNOSIS — Z79.899 LONG-TERM USE OF HIGH-RISK MEDICATION: ICD-10-CM

## 2023-07-13 DIAGNOSIS — M06.09 RHEUMATOID ARTHRITIS, SERONEGATIVE, MULTIPLE SITES (HCC): Primary | ICD-10-CM

## 2023-07-13 LAB
BASOPHILS # BLD: 0.1 K/UL (ref 0–0.2)
BASOPHILS NFR BLD: 2 % (ref 0–2)
DIFFERENTIAL METHOD BLD: ABNORMAL
EOSINOPHIL # BLD: 0 K/UL (ref 0–0.8)
EOSINOPHIL NFR BLD: 0 % (ref 0.5–7.8)
ERYTHROCYTE [DISTWIDTH] IN BLOOD BY AUTOMATED COUNT: 15.6 % (ref 11.9–14.6)
HCT VFR BLD AUTO: 41.4 % (ref 41.1–50.3)
HGB BLD-MCNC: 13.2 G/DL (ref 13.6–17.2)
IMM GRANULOCYTES # BLD AUTO: 0.2 K/UL (ref 0–0.5)
IMM GRANULOCYTES NFR BLD AUTO: 4 % (ref 0–5)
LYMPHOCYTES # BLD: 1.9 K/UL (ref 0.5–4.6)
LYMPHOCYTES NFR BLD: 38 % (ref 13–44)
MCH RBC QN AUTO: 29 PG (ref 26.1–32.9)
MCHC RBC AUTO-ENTMCNC: 31.9 G/DL (ref 31.4–35)
MCV RBC AUTO: 91 FL (ref 82–102)
MONOCYTES # BLD: 0.4 K/UL (ref 0.1–1.3)
MONOCYTES NFR BLD: 7 % (ref 4–12)
NEUTS SEG # BLD: 2.5 K/UL (ref 1.7–8.2)
NEUTS SEG NFR BLD: 49 % (ref 43–78)
NRBC # BLD: 0 K/UL (ref 0–0.2)
PLATELET # BLD AUTO: 237 K/UL (ref 150–450)
PMV BLD AUTO: 9.1 FL (ref 9.4–12.3)
RBC # BLD AUTO: 4.55 M/UL (ref 4.23–5.6)
WBC # BLD AUTO: 5 K/UL (ref 4.3–11.1)

## 2023-07-13 PROCEDURE — 99214 OFFICE O/P EST MOD 30 MIN: CPT | Performed by: INTERNAL MEDICINE

## 2023-07-13 PROCEDURE — G8417 CALC BMI ABV UP PARAM F/U: HCPCS | Performed by: INTERNAL MEDICINE

## 2023-07-13 PROCEDURE — 1036F TOBACCO NON-USER: CPT | Performed by: INTERNAL MEDICINE

## 2023-07-13 PROCEDURE — G8427 DOCREV CUR MEDS BY ELIG CLIN: HCPCS | Performed by: INTERNAL MEDICINE

## 2023-07-13 PROCEDURE — 3017F COLORECTAL CA SCREEN DOC REV: CPT | Performed by: INTERNAL MEDICINE

## 2023-07-13 RX ORDER — LEFLUNOMIDE 10 MG/1
TABLET ORAL
Qty: 90 TABLET | Refills: 1 | Status: SHIPPED | OUTPATIENT
Start: 2023-07-13

## 2023-07-13 RX ORDER — MEDROXYPROGESTERONE ACETATE 150 MG/ML
50 INJECTION, SUSPENSION INTRAMUSCULAR
Qty: 12 ML | Refills: 1 | Status: SHIPPED | OUTPATIENT
Start: 2023-07-13

## 2023-07-13 ASSESSMENT — ROUTINE ASSESSMENT OF PATIENT INDEX DATA (RAPID3)
WHEN YOU AWAKENED IN THE MORNING OVER THE LAST WEEK, PLEASE INDICATE THE AMOUNT OF TIME IT TAKES UNTIL YOU ARE AS LIMBER AS YOU WILL BE FOR THE DAY: 30 MIN
ON A SCALE OF ONE TO TEN, HOW MUCH OF A PROBLEM HAS UNUSUAL FATIGUE OR TIREDNESS BEEN FOR YOU OVER THE PAST WEEK?: 3
ON A SCALE OF ONE TO TEN, HOW MUCH PAIN HAVE YOU HAD BECAUSE OF YOUR CONDITION OVER THE PAST WEEK?: 7
ON A SCALE OF ONE TO TEN, CONSIDERING ALL THE WAYS IN WHICH ILLNESS AND HEALTH CONDITIONS MAY AFFECT YOU AT THIS TIME, PLEASE INDICATE BELOW HOW YOU ARE DOING:: 6
ON A SCALE OF ONE TO TEN, HOW DIFFICULT WAS IT FOR YOU TO COMPLETE THE LISTED DAILY PHYSICAL TASKS OVER THE LAST WEEK: 1.4

## 2023-07-13 ASSESSMENT — JOINT PAIN
TOTAL NUMBER OF TENDER JOINTS: 20
TOTAL NUMBER OF SWOLLEN JOINTS: 10

## 2023-07-13 NOTE — PROGRESS NOTES
MG tablet Take 0.5 tablets by mouth every 4 hours as needed. gabapentin (NEURONTIN) 600 MG tablet Take 2 tablets by mouth 3 times daily. mirtazapine (REMERON) 30 MG tablet Take 1 tablet by mouth      oxyCODONE (OXY-IR) 30 MG immediate release tablet Take 1 tablet by mouth every 4 hours as needed. senna-docusate (PERICOLACE) 8.6-50 MG per tablet Take 1 tablet by mouth 2 times daily as needed      tamsulosin (FLOMAX) 0.4 MG capsule Take 1 capsule by mouth daily      [DISCONTINUED] Etanercept (ENBREL SURECLICK) 50 MG/ML SOAJ Inject 50 mg into the skin every 7 days 12 mL 0    [DISCONTINUED] leflunomide (ARAVA) 10 MG tablet TAKE 1 TABLET BY MOUTH EVERY DAY AFTER FOOD 90 tablet 0    ipratropium-albuterol (DUONEB) 0.5-2.5 (3) MG/3ML SOLN nebulizer solution INHALE THE CONTENTS OF 1 VIAL VIA NEBULIZER FOUR TIMES DAILY 360 each 3    albuterol sulfate HFA (PROVENTIL;VENTOLIN;PROAIR) 108 (90 Base) MCG/ACT inhaler Inhale 2 puffs into the lungs every 4 hours as needed for Wheezing (Patient not taking: Reported on 7/13/2023) 1 each 11    Fluticasone-Salmeterol 55-14 MCG/ACT AEPB Inhale 1 puff into the lungs 2 times daily       No facility-administered encounter medications on file as of 7/13/2023.            REVIEW OF SYSTEMS: The following systems were reviewed with patient today and were negative except for the following (depicted with an \"X\"):        \"X\" General  \"X\" Head and Neck  \"X\" Heart and Breathing  \"X\" Gastrointestinal    Fever/chills   Hair loss   Shortness of breath   Upset stomach    Falls  x Dry mouth   Coughing   Diarrhea / constipation   x Wt loss   Mouth sores   Wheezing   Heartburn    Wt gain   Ringing ears   Chest pain   Dark or bloody stools    Night sweats  x Diff. swallowing  X None of above   Nausea or vomiting    None of above   None of above     X None of above                \"X\" Skin  \"X\" Neurology  \"X\" Urinary/Gyn  \"X\" Other    Easy bruising   Numbness/ tingling   Female problems  x

## 2023-07-14 LAB
ALBUMIN SERPL-MCNC: 3.7 G/DL (ref 3.2–4.6)
ALBUMIN/GLOB SERPL: 0.9 (ref 0.4–1.6)
ALP SERPL-CCNC: 129 U/L (ref 50–136)
ALT SERPL-CCNC: 19 U/L (ref 12–65)
ANION GAP SERPL CALC-SCNC: 5 MMOL/L (ref 2–11)
AST SERPL-CCNC: 15 U/L (ref 15–37)
BILIRUB SERPL-MCNC: 0.3 MG/DL (ref 0.2–1.1)
BUN SERPL-MCNC: 9 MG/DL (ref 8–23)
CALCIUM SERPL-MCNC: 9.7 MG/DL (ref 8.3–10.4)
CHLORIDE SERPL-SCNC: 108 MMOL/L (ref 101–110)
CO2 SERPL-SCNC: 28 MMOL/L (ref 21–32)
CREAT SERPL-MCNC: 1.1 MG/DL (ref 0.8–1.5)
CRP SERPL-MCNC: 2.3 MG/DL (ref 0–0.9)
GLOBULIN SER CALC-MCNC: 4.1 G/DL (ref 2.8–4.5)
GLUCOSE SERPL-MCNC: 93 MG/DL (ref 65–100)
POTASSIUM SERPL-SCNC: 4.9 MMOL/L (ref 3.5–5.1)
PROT SERPL-MCNC: 7.8 G/DL (ref 6.3–8.2)
SODIUM SERPL-SCNC: 141 MMOL/L (ref 133–143)

## 2023-09-30 DIAGNOSIS — M06.09 RHEUMATOID ARTHRITIS, SERONEGATIVE, MULTIPLE SITES (HCC): ICD-10-CM

## 2023-10-25 RX ORDER — LEFLUNOMIDE 10 MG/1
TABLET ORAL
Qty: 90 TABLET | Refills: 1 | OUTPATIENT
Start: 2023-10-25

## 2023-11-16 ENCOUNTER — TELEPHONE (OUTPATIENT)
Dept: RHEUMATOLOGY | Age: 63
End: 2023-11-16

## 2023-11-16 ENCOUNTER — OFFICE VISIT (OUTPATIENT)
Dept: RHEUMATOLOGY | Age: 63
End: 2023-11-16
Payer: MEDICARE

## 2023-11-16 VITALS
DIASTOLIC BLOOD PRESSURE: 117 MMHG | HEIGHT: 67 IN | SYSTOLIC BLOOD PRESSURE: 163 MMHG | WEIGHT: 170 LBS | BODY MASS INDEX: 26.68 KG/M2 | HEART RATE: 70 BPM

## 2023-11-16 DIAGNOSIS — Z79.899 LONG-TERM USE OF HIGH-RISK MEDICATION: ICD-10-CM

## 2023-11-16 DIAGNOSIS — M06.09 RHEUMATOID ARTHRITIS, SERONEGATIVE, MULTIPLE SITES (HCC): ICD-10-CM

## 2023-11-16 DIAGNOSIS — M06.09 RHEUMATOID ARTHRITIS, SERONEGATIVE, MULTIPLE SITES (HCC): Primary | ICD-10-CM

## 2023-11-16 DIAGNOSIS — Z11.1 SCREENING EXAMINATION FOR PULMONARY TUBERCULOSIS: ICD-10-CM

## 2023-11-16 LAB
ALBUMIN SERPL-MCNC: 3.6 G/DL (ref 3.2–4.6)
ALBUMIN/GLOB SERPL: 1 (ref 0.4–1.6)
ALP SERPL-CCNC: 93 U/L (ref 50–136)
ALT SERPL-CCNC: 20 U/L (ref 12–65)
AST SERPL-CCNC: 20 U/L (ref 15–37)
BASOPHILS # BLD: 0.1 K/UL (ref 0–0.2)
BASOPHILS NFR BLD: 1 % (ref 0–2)
BILIRUB DIRECT SERPL-MCNC: 0.1 MG/DL
BILIRUB SERPL-MCNC: 0.3 MG/DL (ref 0.2–1.1)
CRP SERPL-MCNC: <0.3 MG/DL (ref 0–0.9)
DIFFERENTIAL METHOD BLD: ABNORMAL
EOSINOPHIL # BLD: 0 K/UL (ref 0–0.8)
EOSINOPHIL NFR BLD: 0 % (ref 0.5–7.8)
ERYTHROCYTE [DISTWIDTH] IN BLOOD BY AUTOMATED COUNT: 14.3 % (ref 11.9–14.6)
GLOBULIN SER CALC-MCNC: 3.5 G/DL (ref 2.8–4.5)
HCT VFR BLD AUTO: 41.2 % (ref 41.1–50.3)
HGB BLD-MCNC: 13.6 G/DL (ref 13.6–17.2)
IMM GRANULOCYTES # BLD AUTO: 0.3 K/UL (ref 0–0.5)
IMM GRANULOCYTES NFR BLD AUTO: 4 % (ref 0–5)
LYMPHOCYTES # BLD: 3.2 K/UL (ref 0.5–4.6)
LYMPHOCYTES NFR BLD: 48 % (ref 13–44)
MCH RBC QN AUTO: 29.9 PG (ref 26.1–32.9)
MCHC RBC AUTO-ENTMCNC: 33 G/DL (ref 31.4–35)
MCV RBC AUTO: 90.5 FL (ref 82–102)
MONOCYTES # BLD: 0.5 K/UL (ref 0.1–1.3)
MONOCYTES NFR BLD: 8 % (ref 4–12)
NEUTS SEG # BLD: 2.6 K/UL (ref 1.7–8.2)
NEUTS SEG NFR BLD: 39 % (ref 43–78)
NRBC # BLD: 0 K/UL (ref 0–0.2)
PLATELET # BLD AUTO: 204 K/UL (ref 150–450)
PMV BLD AUTO: 8.8 FL (ref 9.4–12.3)
PROT SERPL-MCNC: 7.1 G/DL (ref 6.3–8.2)
RBC # BLD AUTO: 4.55 M/UL (ref 4.23–5.6)
WBC # BLD AUTO: 6.7 K/UL (ref 4.3–11.1)

## 2023-11-16 PROCEDURE — G8484 FLU IMMUNIZE NO ADMIN: HCPCS | Performed by: INTERNAL MEDICINE

## 2023-11-16 PROCEDURE — 99214 OFFICE O/P EST MOD 30 MIN: CPT | Performed by: INTERNAL MEDICINE

## 2023-11-16 PROCEDURE — 3017F COLORECTAL CA SCREEN DOC REV: CPT | Performed by: INTERNAL MEDICINE

## 2023-11-16 PROCEDURE — G8427 DOCREV CUR MEDS BY ELIG CLIN: HCPCS | Performed by: INTERNAL MEDICINE

## 2023-11-16 PROCEDURE — 1036F TOBACCO NON-USER: CPT | Performed by: INTERNAL MEDICINE

## 2023-11-16 PROCEDURE — G8417 CALC BMI ABV UP PARAM F/U: HCPCS | Performed by: INTERNAL MEDICINE

## 2023-11-16 RX ORDER — LEFLUNOMIDE 10 MG/1
TABLET ORAL
Qty: 90 TABLET | Refills: 0 | Status: SHIPPED | OUTPATIENT
Start: 2023-11-16

## 2023-11-16 RX ORDER — MEDROXYPROGESTERONE ACETATE 150 MG/ML
50 INJECTION, SUSPENSION INTRAMUSCULAR
Qty: 12 ML | Refills: 0 | Status: ACTIVE | OUTPATIENT
Start: 2023-11-16

## 2023-11-16 ASSESSMENT — JOINT PAIN
TOTAL NUMBER OF TENDER JOINTS: 22
TOTAL NUMBER OF SWOLLEN JOINTS: 4

## 2023-11-16 ASSESSMENT — ROUTINE ASSESSMENT OF PATIENT INDEX DATA (RAPID3)
ON A SCALE OF ONE TO TEN, HOW MUCH PAIN HAVE YOU HAD BECAUSE OF YOUR CONDITION OVER THE PAST WEEK?: 7
ON A SCALE OF ONE TO TEN, HOW DIFFICULT WAS IT FOR YOU TO COMPLETE THE LISTED DAILY PHYSICAL TASKS OVER THE LAST WEEK: 1.5
WHEN YOU AWAKENED IN THE MORNING OVER THE LAST WEEK, PLEASE INDICATE THE AMOUNT OF TIME IT TAKES UNTIL YOU ARE AS LIMBER AS YOU WILL BE FOR THE DAY: 1 HOUR
ON A SCALE OF ONE TO TEN, HOW MUCH OF A PROBLEM HAS UNUSUAL FATIGUE OR TIREDNESS BEEN FOR YOU OVER THE PAST WEEK?: 8
ON A SCALE OF ONE TO TEN, CONSIDERING ALL THE WAYS IN WHICH ILLNESS AND HEALTH CONDITIONS MAY AFFECT YOU AT THIS TIME, PLEASE INDICATE BELOW HOW YOU ARE DOING:: 7

## 2023-11-16 NOTE — PROGRESS NOTES
Patient Name:  Tiesha Daley Adcox                               YOB: 1960  MRN: [id number]                                                Office Visit 11/17/2023  Rheumatology:  Colton Mello    ASSESSMENT AND PLAN:  (Medical Decision Making)    1. Centrilobular emphysema (HCC)  Continue albuterol. Okay to use the Advair as needed to help spare him cost.  He really does not have a lot of airflow obstruction on his pulmonary function test, though there is mild hyperinflation and air trapping.  - albuterol sulfate HFA (PROVENTIL;VENTOLIN;PROAIR) 108 (90 Base) MCG/ACT inhaler; Inhale 2 puffs into the lungs every 6 hours as needed for Wheezing  Dispense: 3 each; Refill: 3  - Fluticasone-Salmeterol 55-14 MCG/ACT AEPB; Inhale 1 puff into the lungs 2 times daily  Dispense: 3 each; Refill: 3    2. Cavitary lung disease  Particularly notable in 2017 but since then imaging has shown continued inflammatory lung disease. He is on Enbrel and his breathing is stable on pulmonary function testing. I do not think he needs routine CAT scan unless more pulmonary symptoms develop like coughing, hemoptysis, worsening shortness of breath    3. Encounter for vaccination  Discussed the vaccinations which are due for him this fall. He already had his flu vaccine and today we provided the Prevnar 20. The RSV vaccine and COVID booster are also recommended. - Pneumococcal, PCV20, PREVNAR 20, (age 6w+), IM, PF    Orders Placed This Encounter   Medications    albuterol sulfate HFA (PROVENTIL;VENTOLIN;PROAIR) 108 (90 Base) MCG/ACT inhaler     Sig: Inhale 2 puffs into the lungs every 6 hours as needed for Wheezing     Dispense:  3 each     Refill:  3    Fluticasone-Salmeterol 55-14 MCG/ACT AEPB     Sig: Inhale 1 puff into the lungs 2 times daily     Dispense:  3 each     Refill:  3     No orders of the defined types were placed in this encounter. Follow-up and Dispositions    Return for follow up 18 months with Allan Martinez.

## 2023-11-16 NOTE — PROGRESS NOTES
affects renal tubular secretion. Calcium 07/13/2023 9.7  8.3 - 10.4 MG/DL Final    Total Bilirubin 07/13/2023 0.3  0.2 - 1.1 MG/DL Final    ALT 07/13/2023 19  12 - 65 U/L Final    AST 07/13/2023 15  15 - 37 U/L Final    Alk Phosphatase 07/13/2023 129  50 - 136 U/L Final    Total Protein 07/13/2023 7.8  6.3 - 8.2 g/dL Final    Albumin 07/13/2023 3.7  3.2 - 4.6 g/dL Final    Globulin 07/13/2023 4.1  2.8 - 4.5 g/dL Final    Albumin/Globulin Ratio 07/13/2023 0.9  0.4 - 1.6   Final    WBC 07/13/2023 5.0  4.3 - 11.1 K/uL Final    RBC 07/13/2023 4.55  4.23 - 5.6 M/uL Final    Hemoglobin 07/13/2023 13.2 (L)  13.6 - 17.2 g/dL Final    Hematocrit 07/13/2023 41.4  41.1 - 50.3 % Final    MCV 07/13/2023 91.0  82 - 102 FL Final    MCH 07/13/2023 29.0  26.1 - 32.9 PG Final    MCHC 07/13/2023 31.9  31.4 - 35.0 g/dL Final    RDW 07/13/2023 15.6 (H)  11.9 - 14.6 % Final    Platelets 71/13/4553 237  150 - 450 K/uL Final    MPV 07/13/2023 9.1 (L)  9.4 - 12.3 FL Final    nRBC 07/13/2023 0.00  0.0 - 0.2 K/uL Final    **Note: Absolute NRBC parameter is now reported with Hemogram**    Differential Type 07/13/2023 AUTOMATED    Final    Neutrophils % 07/13/2023 49  43 - 78 % Final    Lymphocytes % 07/13/2023 38  13 - 44 % Final    Monocytes % 07/13/2023 7  4.0 - 12.0 % Final    Eosinophils % 07/13/2023 0 (L)  0.5 - 7.8 % Final    Basophils % 07/13/2023 2  0.0 - 2.0 % Final    Immature Granulocytes 07/13/2023 4  0.0 - 5.0 % Final    Neutrophils Absolute 07/13/2023 2.5  1.7 - 8.2 K/UL Final    Lymphocytes Absolute 07/13/2023 1.9  0.5 - 4.6 K/UL Final    Monocytes Absolute 07/13/2023 0.4  0.1 - 1.3 K/UL Final    Eosinophils Absolute 07/13/2023 0.0  0.0 - 0.8 K/UL Final    Basophils Absolute 07/13/2023 0.1  0.0 - 0.2 K/UL Final    Absolute Immature Granulocyte 07/13/2023 0.2  0.0 - 0.5 K/UL Final     The results above were reviewed and discussed with patient.        Assessment/Plan:   Jeremias Lantigua is a 60 y.o. male who presents with:

## 2023-11-17 ENCOUNTER — OFFICE VISIT (OUTPATIENT)
Dept: PULMONOLOGY | Age: 63
End: 2023-11-17

## 2023-11-17 ENCOUNTER — NURSE ONLY (OUTPATIENT)
Dept: PULMONOLOGY | Age: 63
End: 2023-11-17

## 2023-11-17 VITALS
HEIGHT: 67 IN | SYSTOLIC BLOOD PRESSURE: 149 MMHG | BODY MASS INDEX: 26.68 KG/M2 | OXYGEN SATURATION: 95 % | WEIGHT: 170 LBS | HEART RATE: 89 BPM | RESPIRATION RATE: 18 BRPM | DIASTOLIC BLOOD PRESSURE: 94 MMHG

## 2023-11-17 DIAGNOSIS — J43.2 CENTRILOBULAR EMPHYSEMA (HCC): Primary | ICD-10-CM

## 2023-11-17 DIAGNOSIS — Z23 ENCOUNTER FOR VACCINATION: ICD-10-CM

## 2023-11-17 DIAGNOSIS — J98.4 CAVITARY LUNG DISEASE: ICD-10-CM

## 2023-11-17 DIAGNOSIS — R06.09 DYSPNEA ON EXERTION: Primary | ICD-10-CM

## 2023-11-17 LAB
FEF25-27, POC: 2.76 L/S
FET, POC: NORMAL
FEV 1 , POC: 3.42 L
FEV1/FVC, POC: 74
FVC, POC: 4.62
LUNG AGE, POC: NORMAL
PEF, POC: 6 L/S

## 2023-11-17 RX ORDER — FLUTICASONE PROPIONATE AND SALMETEROL 55; 14 UG/1; UG/1
1 POWDER, METERED RESPIRATORY (INHALATION) 2 TIMES DAILY
Qty: 3 EACH | Refills: 3 | Status: SHIPPED | OUTPATIENT
Start: 2023-11-17

## 2023-11-17 RX ORDER — ALBUTEROL SULFATE 90 UG/1
2 AEROSOL, METERED RESPIRATORY (INHALATION) EVERY 6 HOURS PRN
Qty: 3 EACH | Refills: 3 | Status: SHIPPED | OUTPATIENT
Start: 2023-11-17

## 2023-11-17 ASSESSMENT — PULMONARY FUNCTION TESTS
FVC_POC: 4.62
FEV1/FVC_POC: 74

## 2023-11-20 LAB
M TB IFN-G BLD-IMP: NEGATIVE
M TB IFN-G CD4+ T-CELLS BLD-ACNC: 0.08 IU/ML
M TBIFN-G CD4+ CD8+T-CELLS BLD-ACNC: 0.07 IU/ML
QUANTIFERON CRITERIA: NORMAL
QUANTIFERON MITOGEN VALUE: >10 IU/ML
QUANTIFERON NIL VALUE: 0.05 IU/ML

## 2023-11-22 NOTE — TELEPHONE ENCOUNTER
Asssitance has been renewed for assistance with Enbrel through [address] until 12/31/2024. Case Number [id number].

## 2024-02-20 ENCOUNTER — OFFICE VISIT (OUTPATIENT)
Dept: RHEUMATOLOGY | Age: 64
End: 2024-02-20
Payer: MEDICARE

## 2024-02-20 ENCOUNTER — TELEPHONE (OUTPATIENT)
Dept: RHEUMATOLOGY | Age: 64
End: 2024-02-20

## 2024-02-20 VITALS
HEIGHT: 67 IN | HEART RATE: 88 BPM | DIASTOLIC BLOOD PRESSURE: 88 MMHG | SYSTOLIC BLOOD PRESSURE: 122 MMHG | WEIGHT: 176 LBS | BODY MASS INDEX: 27.62 KG/M2

## 2024-02-20 DIAGNOSIS — M06.09 RHEUMATOID ARTHRITIS, SERONEGATIVE, MULTIPLE SITES (HCC): Primary | ICD-10-CM

## 2024-02-20 DIAGNOSIS — M06.09 RHEUMATOID ARTHRITIS, SERONEGATIVE, MULTIPLE SITES (HCC): ICD-10-CM

## 2024-02-20 DIAGNOSIS — Z79.899 LONG-TERM USE OF HIGH-RISK MEDICATION: ICD-10-CM

## 2024-02-20 LAB
ALBUMIN SERPL-MCNC: 3.7 G/DL (ref 3.2–4.6)
ALBUMIN/GLOB SERPL: 1.1 (ref 0.4–1.6)
ALP SERPL-CCNC: 100 U/L (ref 50–136)
ALT SERPL-CCNC: 18 U/L (ref 12–65)
ANION GAP SERPL CALC-SCNC: 5 MMOL/L (ref 2–11)
AST SERPL-CCNC: 17 U/L (ref 15–37)
BASOPHILS # BLD: 0.2 K/UL (ref 0–0.2)
BASOPHILS NFR BLD: 2 % (ref 0–2)
BILIRUB SERPL-MCNC: 0.3 MG/DL (ref 0.2–1.1)
BUN SERPL-MCNC: 7 MG/DL (ref 8–23)
CALCIUM SERPL-MCNC: 9.5 MG/DL (ref 8.3–10.4)
CHLORIDE SERPL-SCNC: 104 MMOL/L (ref 103–113)
CO2 SERPL-SCNC: 30 MMOL/L (ref 21–32)
CREAT SERPL-MCNC: 1.2 MG/DL (ref 0.8–1.5)
CRP SERPL-MCNC: 1.3 MG/DL (ref 0–0.9)
DIFFERENTIAL METHOD BLD: ABNORMAL
EOSINOPHIL # BLD: 0 K/UL (ref 0–0.8)
EOSINOPHIL NFR BLD: 0 % (ref 0.5–7.8)
ERYTHROCYTE [DISTWIDTH] IN BLOOD BY AUTOMATED COUNT: 13.9 % (ref 11.9–14.6)
GLOBULIN SER CALC-MCNC: 3.4 G/DL (ref 2.8–4.5)
GLUCOSE SERPL-MCNC: 77 MG/DL (ref 65–100)
HCT VFR BLD AUTO: 41.3 % (ref 41.1–50.3)
HGB BLD-MCNC: 13.7 G/DL (ref 13.6–17.2)
IMM GRANULOCYTES # BLD AUTO: 0.5 K/UL (ref 0–0.5)
IMM GRANULOCYTES NFR BLD AUTO: 6 % (ref 0–5)
LYMPHOCYTES # BLD: 3.3 K/UL (ref 0.5–4.6)
LYMPHOCYTES NFR BLD: 41 % (ref 13–44)
MCH RBC QN AUTO: 30.4 PG (ref 26.1–32.9)
MCHC RBC AUTO-ENTMCNC: 33.2 G/DL (ref 31.4–35)
MCV RBC AUTO: 91.6 FL (ref 82–102)
MONOCYTES # BLD: 0.8 K/UL (ref 0.1–1.3)
MONOCYTES NFR BLD: 10 % (ref 4–12)
NEUTS SEG # BLD: 3.2 K/UL (ref 1.7–8.2)
NEUTS SEG NFR BLD: 41 % (ref 43–78)
NRBC # BLD: 0 K/UL (ref 0–0.2)
PLATELET # BLD AUTO: 231 K/UL (ref 150–450)
PLATELET COMMENT: ADEQUATE
PMV BLD AUTO: 8.9 FL (ref 9.4–12.3)
POTASSIUM SERPL-SCNC: 4.5 MMOL/L (ref 3.5–5.1)
PROT SERPL-MCNC: 7.1 G/DL (ref 6.3–8.2)
RBC # BLD AUTO: 4.51 M/UL (ref 4.23–5.6)
RBC MORPH BLD: ABNORMAL
SODIUM SERPL-SCNC: 139 MMOL/L (ref 136–146)
WBC # BLD AUTO: 8 K/UL (ref 4.3–11.1)
WBC MORPH BLD: ABNORMAL

## 2024-02-20 PROCEDURE — 3017F COLORECTAL CA SCREEN DOC REV: CPT | Performed by: INTERNAL MEDICINE

## 2024-02-20 PROCEDURE — G8417 CALC BMI ABV UP PARAM F/U: HCPCS | Performed by: INTERNAL MEDICINE

## 2024-02-20 PROCEDURE — G8427 DOCREV CUR MEDS BY ELIG CLIN: HCPCS | Performed by: INTERNAL MEDICINE

## 2024-02-20 PROCEDURE — 99214 OFFICE O/P EST MOD 30 MIN: CPT | Performed by: INTERNAL MEDICINE

## 2024-02-20 PROCEDURE — G8484 FLU IMMUNIZE NO ADMIN: HCPCS | Performed by: INTERNAL MEDICINE

## 2024-02-20 PROCEDURE — 1036F TOBACCO NON-USER: CPT | Performed by: INTERNAL MEDICINE

## 2024-02-20 RX ORDER — LEFLUNOMIDE 10 MG/1
TABLET ORAL
Qty: 90 TABLET | Refills: 1 | Status: SHIPPED | OUTPATIENT
Start: 2024-02-20

## 2024-02-20 ASSESSMENT — ROUTINE ASSESSMENT OF PATIENT INDEX DATA (RAPID3)
ON A SCALE OF ONE TO TEN, HOW MUCH PAIN HAVE YOU HAD BECAUSE OF YOUR CONDITION OVER THE PAST WEEK?: 7
ON A SCALE OF ONE TO TEN, HOW MUCH OF A PROBLEM HAS UNUSUAL FATIGUE OR TIREDNESS BEEN FOR YOU OVER THE PAST WEEK?: 6
ON A SCALE OF ONE TO TEN, HOW DIFFICULT WAS IT FOR YOU TO COMPLETE THE LISTED DAILY PHYSICAL TASKS OVER THE LAST WEEK: 1.1
WHEN YOU AWAKENED IN THE MORNING OVER THE LAST WEEK, PLEASE INDICATE THE AMOUNT OF TIME IT TAKES UNTIL YOU ARE AS LIMBER AS YOU WILL BE FOR THE DAY: 1 HOUR
ON A SCALE OF ONE TO TEN, CONSIDERING ALL THE WAYS IN WHICH ILLNESS AND HEALTH CONDITIONS MAY AFFECT YOU AT THIS TIME, PLEASE INDICATE BELOW HOW YOU ARE DOING:: 7

## 2024-02-20 ASSESSMENT — JOINT PAIN
TOTAL NUMBER OF SWOLLEN JOINTS: 6
TOTAL NUMBER OF TENDER JOINTS: 22

## 2024-02-20 NOTE — PROGRESS NOTES
[facility]  Chery Uriarte M.D.  [address] , [address]   [place]  Office : [phone], Fax: [phone]     RHEUMATOLOGY OFFICE VISIT NOTE  Date of Visit:  2024 11:31 AM    Patient Information:  Name:  Thee Sue  :  1960  Age:  63 y.o.   Gender:  male      Mr. Sue is here today for follow-up of RA and medication monitoring.      Last visit: 23    History of Present Illness: On talking to the patient today he states that he has been self catheterizing himself since he does have issues with urinary retention.  On talking to him further he states that he has not had any odor to his urine with no blood in his urine either.  He does complain of having increased urgency though.  He continues to have active disease even though he remains on the Enbrel shot that he administers to himself every week.  His current joint complaints are as mentioned below.    Since the last visit, patient is feeling \"fair\".    Pain: 7/10  Location:  Bilateral shoulder and hand pain. Some para spinal muscle pain. Bilateral wrist pain with some swelling with no warmth and redness. Some pain with no swelling of the MCP and PIP joints with no warmth and redness. Some mid back and shoulder blade pain. Some stiffness of his ankles with no buckling with some swelling with no warmth and redness. Some pain on the planter aspect of his feet with some swelling in the ball of his feet with no warmth and redness.   Quality:  Throbbing pain.   Modifying Factors:  1st thing in the morning the stiffness is the worst and the pain is the worst at the end of the day.   Associated Symptoms:  Has a weak  with difficulty opening jars and buttoning and unbuttoning. No tingling, numbness or pain down the arms or legs with intermittent tingling and numbness of his hands and feet.         2024     9:00 AM   DMARD/Biologic   AM Stiffness 1 hour   Pain 7   Fatigue 6

## 2024-02-21 RX ORDER — ABATACEPT 125 MG/ML
125 INJECTION, SOLUTION SUBCUTANEOUS
Qty: 4 ML | Refills: 3 | Status: ACTIVE | OUTPATIENT
Start: 2024-02-21

## 2024-02-21 NOTE — TELEPHONE ENCOUNTER
----- Message from Jumana Meyers MA sent at 2/20/2024 12:55 PM EST -----  Regarding: Chery Pringle MD Lipscomb, Jumana CENTENO MA    Patient continues to have active disease while on Enbrel.  Please can you run a PA on subcu Orencia. Thanks

## 2024-02-21 NOTE — TELEPHONE ENCOUNTER
Orencia SQ ordered by Dr. Uriarte. Rx entered and pended for review and sig to go to [facility].   Patient has been on patient assistance for Enbrel through [employer]. May need assistance for the Orencia.

## 2024-02-22 NOTE — TELEPHONE ENCOUNTER
Sonal started PA for Orencia. Orencia is non- preferred. Preferred meds include Humira (+ several biosimilars), Enbrel, Rinvoq, and Kevzara. HE must try ans fail all or have clinical reason why he cannot take the meds.     Enbrel started 3/14/2020.   Rinvoq- caution advised in patients who are current or past smokers- increased risk of CV event. (He smoked for 20 years in past)   Do not see any contraindications to Kevzara.     Do you want to switch to Kevzara, or have Orencia denied and pursue the appeal?

## 2024-02-23 NOTE — TELEPHONE ENCOUNTER
Rx for Kevzara 200 mg auto injector every 14 days entered and pended for review and sig to go to [facility], since Orencia is non-preferred.

## 2024-02-25 RX ORDER — SARILUMAB 200 MG/1.14ML
200 INJECTION, SOLUTION SUBCUTANEOUS
Qty: 2.28 ML | Refills: 3 | Status: SHIPPED | OUTPATIENT
Start: 2024-02-25

## 2024-02-26 LAB
BIOMARKER COMMENT: NORMAL
CRP RESULT: 13 MG/L
EGF RESULT: 190 PG/ML
FOOTNOTE/HISTORY: NORMAL
IL-6 RESULT: 15 PG/ML
LEPTIN RESULT: 18 NG/ML
Lab: NORMAL
Lab: NORMAL
MMP-1 RESULT: 6.2 NG/ML
MMP3 RESULT: 33 NG/ML
RESISTIN RESULT: 4.6 NG/ML
RISK OF RADIOGRAPHIC PROGRESS: 7 %
SAA RESULT: 2.8 UG/ML
TNF-RI RESULT: 1.4 NG/ML
VCAM-1 RESULT: 0.66 UG/ML
VECTRA SCORE: 45
VECTRA(R) LEVEL: NORMAL
VEGF-A RESULT: 210 PG/ML
YKL-40 RESULT: 620 NG/ML

## 2024-06-24 ENCOUNTER — OFFICE VISIT (OUTPATIENT)
Dept: RHEUMATOLOGY | Age: 64
End: 2024-06-24
Payer: MEDICARE

## 2024-06-24 VITALS
HEART RATE: 67 BPM | HEIGHT: 67 IN | BODY MASS INDEX: 26.43 KG/M2 | DIASTOLIC BLOOD PRESSURE: 97 MMHG | WEIGHT: 168.4 LBS | SYSTOLIC BLOOD PRESSURE: 143 MMHG

## 2024-06-24 DIAGNOSIS — M06.09 RHEUMATOID ARTHRITIS, SERONEGATIVE, MULTIPLE SITES (HCC): Primary | ICD-10-CM

## 2024-06-24 DIAGNOSIS — M06.09 RHEUMATOID ARTHRITIS, SERONEGATIVE, MULTIPLE SITES (HCC): ICD-10-CM

## 2024-06-24 DIAGNOSIS — Z79.899 LONG-TERM USE OF HIGH-RISK MEDICATION: ICD-10-CM

## 2024-06-24 LAB
ALBUMIN SERPL-MCNC: 4.3 G/DL (ref 3.2–4.6)
ALBUMIN/GLOB SERPL: 1.5 (ref 1–1.9)
ALP SERPL-CCNC: 69 U/L (ref 40–129)
ALT SERPL-CCNC: 19 U/L (ref 12–65)
ANION GAP SERPL CALC-SCNC: 9 MMOL/L (ref 9–18)
AST SERPL-CCNC: 31 U/L (ref 15–37)
BILIRUB SERPL-MCNC: 0.4 MG/DL (ref 0–1.2)
BUN SERPL-MCNC: 5 MG/DL (ref 8–23)
CALCIUM SERPL-MCNC: 9.9 MG/DL (ref 8.8–10.2)
CHLORIDE SERPL-SCNC: 97 MMOL/L (ref 98–107)
CO2 SERPL-SCNC: 29 MMOL/L (ref 20–28)
CREAT SERPL-MCNC: 1.05 MG/DL (ref 0.8–1.3)
ERYTHROCYTE [SEDIMENTATION RATE] IN BLOOD: <1 MM/HR
GLOBULIN SER CALC-MCNC: 2.8 G/DL (ref 2.3–3.5)
GLUCOSE SERPL-MCNC: 92 MG/DL (ref 70–99)
POTASSIUM SERPL-SCNC: 4.6 MMOL/L (ref 3.5–5.1)
PROT SERPL-MCNC: 7.1 G/DL (ref 6.3–8.2)
SODIUM SERPL-SCNC: 136 MMOL/L (ref 136–145)

## 2024-06-24 PROCEDURE — 3017F COLORECTAL CA SCREEN DOC REV: CPT | Performed by: INTERNAL MEDICINE

## 2024-06-24 PROCEDURE — 99214 OFFICE O/P EST MOD 30 MIN: CPT | Performed by: INTERNAL MEDICINE

## 2024-06-24 PROCEDURE — G8427 DOCREV CUR MEDS BY ELIG CLIN: HCPCS | Performed by: INTERNAL MEDICINE

## 2024-06-24 PROCEDURE — G2211 COMPLEX E/M VISIT ADD ON: HCPCS | Performed by: INTERNAL MEDICINE

## 2024-06-24 PROCEDURE — 1036F TOBACCO NON-USER: CPT | Performed by: INTERNAL MEDICINE

## 2024-06-24 PROCEDURE — G8417 CALC BMI ABV UP PARAM F/U: HCPCS | Performed by: INTERNAL MEDICINE

## 2024-06-24 RX ORDER — LEFLUNOMIDE 20 MG/1
TABLET ORAL
Qty: 90 TABLET | Refills: 1 | Status: SHIPPED | OUTPATIENT
Start: 2024-06-24

## 2024-06-24 RX ORDER — SARILUMAB 200 MG/1.14ML
200 INJECTION, SOLUTION SUBCUTANEOUS
Qty: 2.28 ML | Refills: 1 | Status: ACTIVE | OUTPATIENT
Start: 2024-06-24

## 2024-06-24 RX ORDER — DESVENLAFAXINE 100 MG/1
100 TABLET, EXTENDED RELEASE ORAL DAILY
COMMUNITY

## 2024-06-24 ASSESSMENT — ROUTINE ASSESSMENT OF PATIENT INDEX DATA (RAPID3)
ON A SCALE OF ONE TO TEN, HOW MUCH PAIN HAVE YOU HAD BECAUSE OF YOUR CONDITION OVER THE PAST WEEK?: 7
ON A SCALE OF ONE TO TEN, HOW MUCH OF A PROBLEM HAS UNUSUAL FATIGUE OR TIREDNESS BEEN FOR YOU OVER THE PAST WEEK?: 9
ON A SCALE OF ONE TO TEN, CONSIDERING ALL THE WAYS IN WHICH ILLNESS AND HEALTH CONDITIONS MAY AFFECT YOU AT THIS TIME, PLEASE INDICATE BELOW HOW YOU ARE DOING:: 7
ON A SCALE OF ONE TO TEN, HOW DIFFICULT WAS IT FOR YOU TO COMPLETE THE LISTED DAILY PHYSICAL TASKS OVER THE LAST WEEK: 1.2
WHEN YOU AWAKENED IN THE MORNING OVER THE LAST WEEK, PLEASE INDICATE THE AMOUNT OF TIME IT TAKES UNTIL YOU ARE AS LIMBER AS YOU WILL BE FOR THE DAY: 1 HOUR

## 2024-06-24 ASSESSMENT — JOINT PAIN
TOTAL NUMBER OF SWOLLEN JOINTS: 2
TOTAL NUMBER OF TENDER JOINTS: 4

## 2024-06-24 NOTE — PROGRESS NOTES
[facility]  Chery Uriarte M.D.  [address] , [address]   [facility] [place]  Office : [phone], Fax: [phone]     RHEUMATOLOGY OFFICE VISIT NOTE  Date of Visit:  2024 3:26 PM    Patient Information:  Name:  Thee Sue  :  1960  Age:  63 y.o.   Gender:  male      Mr. Sue is here today for follow-up of RA and medication monitoring.      Last visit: 24    History of Present Illness: On talking to the patient he states that when he transitioned off the Enbrel and started the Kevzara he did seem to have had a flare of his underlying joint condition.  His current joint complaints are as mentioned below.    Since the last visit, patient is feeling \"fair\".    Pain: 7/10  Location:  Bilateral shoulder pain with proximal arm pain. Some neck stiffness with no pain with neck ROM. Occasional tension headaches. Some mid back and lower back pain. Bilateral hip pain with groin pain. Bilateral ankle pain with swelling with no warmth and redness. No buckling of the ankles. Some pain in the dorsum of his feet. Bilateral wrist pain with swelling with no warmth and redness. Some pain with no swelling of the MCP and PIP joints of both the hands.   Quality: Deep achy pain.     Modifying Factors: 1st thing in the morning the pain is the worst and movement eases the pain.   Associated Symptoms:  No limitations with his ADL's except for opening jars and buttoning and unbuttoning. No tingling, numbness or pain down the arms with intermittent tingling and numbness of his hands and feet.         2024     2:00 PM   DMARD/Biologic   AM Stiffness 1 hour   Pain 7   Fatigue 9   MDHAQ 1.2   Patient Global Score 7   Medication Name Arava   Medication Name Other   Other Medication Kevzara     Last TB screen: 23  TB result: Negative.     Current dose of steroids: None   How long on current dose of steroids: NA  How long on continuous steroid therapy: NA

## 2024-06-25 LAB
BASOPHILS # BLD: 0.1 K/UL (ref 0–0.2)
BASOPHILS NFR BLD: 2 % (ref 0–2)
DIFFERENTIAL METHOD BLD: ABNORMAL
EOSINOPHIL # BLD: 0 K/UL (ref 0–0.8)
EOSINOPHIL NFR BLD: 0 % (ref 0.5–7.8)
ERYTHROCYTE [DISTWIDTH] IN BLOOD BY AUTOMATED COUNT: 14.4 % (ref 11.9–14.6)
HCT VFR BLD AUTO: 44.8 % (ref 41.1–50.3)
HGB BLD-MCNC: 14.8 G/DL (ref 13.6–17.2)
IMM GRANULOCYTES # BLD AUTO: 0.1 K/UL (ref 0–0.5)
IMM GRANULOCYTES NFR BLD AUTO: 2 % (ref 0–5)
LYMPHOCYTES # BLD: 3.2 K/UL (ref 0.5–4.6)
LYMPHOCYTES NFR BLD: 58 % (ref 13–44)
MCH RBC QN AUTO: 30.4 PG (ref 26.1–32.9)
MCHC RBC AUTO-ENTMCNC: 33 G/DL (ref 31.4–35)
MCV RBC AUTO: 92 FL (ref 82–102)
MONOCYTES # BLD: 0.5 K/UL (ref 0.1–1.3)
MONOCYTES NFR BLD: 10 % (ref 4–12)
NEUTS SEG # BLD: 1.6 K/UL (ref 1.7–8.2)
NEUTS SEG NFR BLD: 29 % (ref 43–78)
NRBC # BLD: 0 K/UL (ref 0–0.2)
PLATELET # BLD AUTO: 204 K/UL (ref 150–450)
PMV BLD AUTO: 9.1 FL (ref 9.4–12.3)
RBC # BLD AUTO: 4.87 M/UL (ref 4.23–5.6)
WBC # BLD AUTO: 5.4 K/UL (ref 4.3–11.1)

## 2024-07-01 ENCOUNTER — TELEPHONE (OUTPATIENT)
Dept: RHEUMATOLOGY | Age: 64
End: 2024-07-01

## 2024-07-01 NOTE — TELEPHONE ENCOUNTER
Pt left  that he got a lab bill for 1561.00. He called [facility] and they advised him that he should not be paying this?

## 2024-07-02 LAB
BIOMARKER COMMENT: NORMAL
C-REACTIVE PROTEIN: 0.13 MG/L
EGF RESULT: 60 PG/ML
FOOTNOTE/HISTORY: NORMAL
IL-6 RESULT: 310 PG/ML
LEPTIN RESULT: 13 NG/ML
Lab: NORMAL
Lab: NORMAL
MMP-1 RESULT: 4.5 NG/ML
MMP3 RESULT: 35 NG/ML
RESISTIN RESULT: 6.1 NG/ML
RISK OF RADIOGRAPHIC PROGRESS: 8 %
SAA RESULT: 0.37 UG/ML
TNF-RI RESULT: 1.3 NG/ML
VCAM-1 RESULT: 0.68 UG/ML
VECTRA SCORE: 48
VECTRA(R) LEVEL: NORMAL
VEGF-A RESULT: 240 PG/ML
YKL-40 RESULT: 400 NG/ML

## 2024-07-10 NOTE — TELEPHONE ENCOUNTER
I called and spoke with the pt, advised him that he needs to call the number on the bill to discuss this.

## 2024-07-25 ENCOUNTER — TELEPHONE (OUTPATIENT)
Dept: RHEUMATOLOGY | Age: 64
End: 2024-07-25

## 2024-07-25 DIAGNOSIS — M06.09 RHEUMATOID ARTHRITIS, SERONEGATIVE, MULTIPLE SITES (HCC): Primary | ICD-10-CM

## 2024-07-25 NOTE — TELEPHONE ENCOUNTER
Fax received from [facility]. Patient needs a refill on his Kevzara. Rx is pended for review and sig. Medication free of charge from [facility] [facility]

## 2024-07-26 RX ORDER — SARILUMAB 200 MG/1.14ML
INJECTION, SOLUTION SUBCUTANEOUS
Qty: 2 EACH | Refills: 3 | Status: ACTIVE | OUTPATIENT
Start: 2024-07-26

## 2024-08-05 ENCOUNTER — OFFICE VISIT (OUTPATIENT)
Dept: RHEUMATOLOGY | Age: 64
End: 2024-08-05
Payer: MEDICARE

## 2024-08-05 VITALS
HEIGHT: 67 IN | WEIGHT: 165.6 LBS | BODY MASS INDEX: 25.99 KG/M2 | DIASTOLIC BLOOD PRESSURE: 109 MMHG | HEART RATE: 83 BPM | SYSTOLIC BLOOD PRESSURE: 161 MMHG

## 2024-08-05 DIAGNOSIS — Z79.899 LONG-TERM USE OF HIGH-RISK MEDICATION: ICD-10-CM

## 2024-08-05 DIAGNOSIS — M06.09 RHEUMATOID ARTHRITIS, SERONEGATIVE, MULTIPLE SITES (HCC): Primary | ICD-10-CM

## 2024-08-05 PROCEDURE — 99214 OFFICE O/P EST MOD 30 MIN: CPT | Performed by: INTERNAL MEDICINE

## 2024-08-05 PROCEDURE — 1036F TOBACCO NON-USER: CPT | Performed by: INTERNAL MEDICINE

## 2024-08-05 PROCEDURE — G2211 COMPLEX E/M VISIT ADD ON: HCPCS | Performed by: INTERNAL MEDICINE

## 2024-08-05 PROCEDURE — G8427 DOCREV CUR MEDS BY ELIG CLIN: HCPCS | Performed by: INTERNAL MEDICINE

## 2024-08-05 PROCEDURE — G8417 CALC BMI ABV UP PARAM F/U: HCPCS | Performed by: INTERNAL MEDICINE

## 2024-08-05 PROCEDURE — 3017F COLORECTAL CA SCREEN DOC REV: CPT | Performed by: INTERNAL MEDICINE

## 2024-08-05 RX ORDER — LEFLUNOMIDE 20 MG/1
TABLET ORAL
Qty: 90 TABLET | Refills: 1 | Status: SHIPPED | OUTPATIENT
Start: 2024-08-05

## 2024-08-05 ASSESSMENT — ROUTINE ASSESSMENT OF PATIENT INDEX DATA (RAPID3)
WHEN YOU AWAKENED IN THE MORNING OVER THE LAST WEEK, PLEASE INDICATE THE AMOUNT OF TIME IT TAKES UNTIL YOU ARE AS LIMBER AS YOU WILL BE FOR THE DAY: 1 HOUR
ON A SCALE OF ONE TO TEN, HOW MUCH PAIN HAVE YOU HAD BECAUSE OF YOUR CONDITION OVER THE PAST WEEK?: 7
ON A SCALE OF ONE TO TEN, HOW MUCH OF A PROBLEM HAS UNUSUAL FATIGUE OR TIREDNESS BEEN FOR YOU OVER THE PAST WEEK?: 7
ON A SCALE OF ONE TO TEN, HOW DIFFICULT WAS IT FOR YOU TO COMPLETE THE LISTED DAILY PHYSICAL TASKS OVER THE LAST WEEK: 1.2
ON A SCALE OF ONE TO TEN, CONSIDERING ALL THE WAYS IN WHICH ILLNESS AND HEALTH CONDITIONS MAY AFFECT YOU AT THIS TIME, PLEASE INDICATE BELOW HOW YOU ARE DOING:: 7

## 2024-08-05 ASSESSMENT — JOINT PAIN
TOTAL NUMBER OF TENDER JOINTS: 20
TOTAL NUMBER OF SWOLLEN JOINTS: 6

## 2024-08-05 NOTE — PROGRESS NOTES
[facility]  Chery Uriarte M.D.  [address] , [address]   [facility]  Office : [phone], Fax: [phone]     RHEUMATOLOGY OFFICE VISIT NOTE  Date of Visit:  2024 1:39 PM    Patient Information:  Name:  Thee Sue  :  1960  Age:  63 y.o.   Gender:  male      Mr. Sue is here today for follow-up of RA and medication monitoring.       Last visit: 24    History of Present Illness: On talking to the patient today he states that he has not had any cough, congestion with no fever or chills. He states that his BP goes up when he comes to the dr and when his pain level is up his blood pressure is generally up as well. His B.P at home runs in the range of 160/90.  On talking to him further he states that he has not had a vaccine for Shinges in the past hence I do not believe he would be a candidate to be started on a Lucien inhibitor at this time.  On reviewing his records it does appear that he was started on Kevzara in March of this year.  Patient's current joint complaints are as mentioned below.    Since the last visit, patient is feeling \"fair\".    Pain: 7/10  Location:  Bilateral shoulder pain.  hands, knees. Some neck stiffness with pain with neck ROM. Occasional tension headaches. Occasional lower back pain. Bilateral knee pain with swelling with no buckling of the knees. Bilateral ankle pain and swelling with no buckling with some pain in the ball of his toes.   Quality:  Throbbing pain.   Modifying Factors:  1st thing in the morning the stiffness is the worst   Associated Symptoms:  Positional tingling, numbness with no pain down the arms or legs with intermittent tingling and numbness in his feet. No limitations with his ADL's.         2024    12:00 PM   DMARD/Biologic   AM Stiffness 1 hour   Pain 7   Fatigue 7   MDHAQ 1.2   Patient Global Score 7   Medication Name Arava   Medication Name Other   Other Medication Kevzara

## 2024-08-06 ENCOUNTER — TELEPHONE (OUTPATIENT)
Dept: RHEUMATOLOGY | Age: 64
End: 2024-08-06

## 2024-08-06 DIAGNOSIS — M06.09 RHEUMATOID ARTHRITIS, SERONEGATIVE, MULTIPLE SITES (HCC): Primary | ICD-10-CM

## 2024-08-07 RX ORDER — CERTOLIZUMAB PEGOL 200 MG/ML
400 INJECTION, SOLUTION SUBCUTANEOUS
Qty: 6 EACH | Refills: 0 | Status: SHIPPED | OUTPATIENT
Start: 2024-08-07

## 2024-08-07 NOTE — TELEPHONE ENCOUNTER
----- Message from Jumana Meyers MA sent at 8/5/2024  5:03 PM EDT -----  Regarding: Chery Doll MD Lipscomb, Jumana CENTENO MA  Please run a PA on Cimzia his VECTRA was still high on Kevzara.

## 2024-08-07 NOTE — TELEPHONE ENCOUNTER
Rx for Cimzia 200 X 2 syringe kit, 3 kits, to inject 400 mg every 2 weeks for loading dosing. Entered and pended for review and sig to go to [facility].   Patient has needed patient assistance for Enbrel and Kevzara since he has a [facility] advantage plan. Will see what his benefits are for Cimzia.

## 2024-08-08 NOTE — TELEPHONE ENCOUNTER
Email from Sara PALOMARES for Cimzia approved, but patient's OOP cost is over $1900. Spoke with rep- [employer] does not have any  assistance programs for Medicare patients.   Meds tried and failed: Casey Silva    Manufacturers that have assistance: [employer] (Actemra, Rituxan), [employer] (Humira), [employer] (Remicade, Simponi, Simponi Aria), [employer] (Orencia- SQ).     Let me know how you would like to proceed.

## 2024-08-26 ENCOUNTER — TELEPHONE (OUTPATIENT)
Dept: RHEUMATOLOGY | Age: 64
End: 2024-08-26

## 2024-08-26 NOTE — TELEPHONE ENCOUNTER
----- Message from Pearl GUZMAN sent at 8/15/2024 10:08 AM EDT -----  Regarding: RE: Actemra IV  2024 - ACTERMA - [employer] - consent form e-mailed to patient. Prescription form given to MD 8/9/24 / Patient hasn't returned enrollment tor Actemra.   2024 - Acterma - [employer] - ded $750.00 had met ded. OOP 3450.00 met - 1175.00 - Rem.-$2275.00 8/15/2024   Called [facility] - PA Req for  & [id number] Per Rep: Rema - Ref #[id number] - 20% Co ins. Auth provided [id number]. 8/15/2024   ----- Message -----  From: Jumana Meyers MA  Sent: 8/8/2024  10:26 PM EDT  To: Pearl Osuna; Jumana Meyers MA  Subject: Actemra IV                                       Ev, please verify coverage for Actemra IV for RA.   He will most likely need [facility], but lets start with benefits.   thanks

## 2024-08-26 NOTE — TELEPHONE ENCOUNTER
PA for Actemra IV started online on CMM.   Form for [employer] printed to fill out and fax to [employer] as patient will not be able to afford the 20% coinsurance.

## 2024-08-27 NOTE — TELEPHONE ENCOUNTER
Faxed Form for [employer] patient assistance form for Actemra IV

## 2024-09-10 NOTE — TELEPHONE ENCOUNTER
PHONE CALL from [employer] regarding enrollment form- needing clarification on eligibility reason for assistance. [phone]. Needed to ashley \"Insured but lacks coverage for this medication\".   I have to do an appeal before they will consider him for assistance.   Faxed appeal letter today through [employer] to [employer] Appeals dept

## 2024-09-12 NOTE — TELEPHONE ENCOUNTER
PA for Actemra has been approved through 12/31/2024 by [facility]. Letter faxed to [employer] to finish processing his application for assistance.

## 2024-10-02 NOTE — TELEPHONE ENCOUNTER
PHONE CALL to [employer]- patient was denied for assistance with Actemra.   PHONE CALL to [employer] to see why he was denied?   Patient was approved by [facility] for the Actemra, but cannot afford the medication.   He will be a situation 3 patient- has different eligibility criteria.   Income vs OOP max. If OOP is more than 7.5% of their annual income, they would qualify for assistance. Reported OOP mas is 3,450. Partnered with his income, he does not qualify as it is only 4.5%.   Most likely would be denied for [place] as well.     Meds tried and failed:Enbrel, Kevzara.   Most of the patient assistance programs have similar requirements. We could try for a [employer] product. He would have to spend 4% of his gross annual income on pharmaceutical drugs. [employer] requires 3%. He listed income in 2023 to be approx $80,000. This is in the [employer] limits of $118,000 for household of 2. 4% would be $3200, which I doubt he has spent.   How do you want me to proceed?

## 2024-10-11 ENCOUNTER — TELEPHONE (OUTPATIENT)
Dept: RHEUMATOLOGY | Age: 64
End: 2024-10-11

## 2024-10-11 NOTE — TELEPHONE ENCOUNTER
Pt left  that he thought the dr was going to change his Kevzara to something else but he has not heard anything from us? He said that he is still taking the Kevzara for now but wanted to make sure that we knew that he will still need some type of assistance with what ever she wants him to have. Looks like Jumana is already working on this.     Attempted to call patient. No answer. Left  for pt to call back.

## 2024-10-15 NOTE — TELEPHONE ENCOUNTER
I left vm that it looks like Jumana Louiecomb is already working on getting him another medication and she should be calling him once the insurance approves something.

## 2024-11-14 ENCOUNTER — TELEMEDICINE (OUTPATIENT)
Dept: RHEUMATOLOGY | Age: 64
End: 2024-11-14

## 2024-11-14 DIAGNOSIS — M06.09 RHEUMATOID ARTHRITIS, SERONEGATIVE, MULTIPLE SITES (HCC): Primary | ICD-10-CM

## 2024-11-14 DIAGNOSIS — Z11.1 SCREENING EXAMINATION FOR PULMONARY TUBERCULOSIS: ICD-10-CM

## 2024-11-14 DIAGNOSIS — Z79.899 LONG-TERM USE OF HIGH-RISK MEDICATION: ICD-10-CM

## 2024-11-14 RX ORDER — LEFLUNOMIDE 20 MG/1
TABLET ORAL
Qty: 90 TABLET | Refills: 1 | Status: SHIPPED | OUTPATIENT
Start: 2024-11-14

## 2024-11-14 NOTE — PROGRESS NOTES
that affects renal tubular secretion.      Calcium 06/24/2024 9.9  8.8 - 10.2 MG/DL Final    Total Bilirubin 06/24/2024 0.4  0.0 - 1.2 MG/DL Final    ALT 06/24/2024 19  12 - 65 U/L Final    AST 06/24/2024 31  15 - 37 U/L Final    Alk Phosphatase 06/24/2024 69  40 - 129 U/L Final    Total Protein 06/24/2024 7.1  6.3 - 8.2 g/dL Final    Albumin 06/24/2024 4.3  3.2 - 4.6 g/dL Final    Globulin 06/24/2024 2.8  2.3 - 3.5 g/dL Final    Albumin/Globulin Ratio 06/24/2024 1.5  1.0 - 1.9   Final     The results above were reviewed and discussed with patient.       Assessment/Plan:   Thee Sue is a 63 y.o. male who presents with:     Rheumatoid arthritis, seronegative, multiple sites (HCC): Patient was instructed to continue leflunomide 20 mg 1 pill once a day for now while remaining on Kevzara one-shot to be administered to self every 2 weeks until I can get the IV Remicade approved.  Once the IV Remicade is approved patient will stop the Kevzara but will continue the leflunomide 20 mg once a day.  Patient is aware that if he is sick requiring him to be on an antibiotic or antiviral drug he will need to skip coming in for his IV Remicade infusion until he has completed the antibiotic/antiviral course and is over the infection.   -     C-Reactive Protein; Future  -     leflunomide (ARAVA) 20 MG tablet; TAKE 1 TABLET BY MOUTH EVERY DAY AFTER FOOD    Long-term use of high-risk medication: Lab results from the last visit were reviewed with the patient today.  If there is any noted abnormality from today's labs I will keep the patient informed but if not I will review his labs with him on his follow-up visit.   -     CBC with Auto Differential; Future  -     Comprehensive Metabolic Panel; Future    Screening examination for pulmonary tuberculosis: I will keep the patient informed accordingly.  -     Quantiferon, Incubated; Future    Disease activity plan:  As stated above.    Steroid management plan:  As stated above, if

## 2024-11-18 DIAGNOSIS — Z79.899 LONG-TERM USE OF HIGH-RISK MEDICATION: ICD-10-CM

## 2024-11-18 DIAGNOSIS — M06.09 RHEUMATOID ARTHRITIS, SERONEGATIVE, MULTIPLE SITES (HCC): ICD-10-CM

## 2024-11-18 DIAGNOSIS — Z11.1 SCREENING EXAMINATION FOR PULMONARY TUBERCULOSIS: ICD-10-CM

## 2024-11-18 LAB
ALBUMIN SERPL-MCNC: 3.4 G/DL (ref 3.2–4.6)
ALBUMIN/GLOB SERPL: 1 (ref 1–1.9)
ALP SERPL-CCNC: 90 U/L (ref 40–129)
ALT SERPL-CCNC: 24 U/L (ref 8–55)
ANION GAP SERPL CALC-SCNC: 10 MMOL/L (ref 7–16)
AST SERPL-CCNC: 36 U/L (ref 15–37)
BASOPHILS # BLD: 0.1 K/UL (ref 0–0.2)
BASOPHILS NFR BLD: 1 % (ref 0–2)
BILIRUB SERPL-MCNC: <0.2 MG/DL (ref 0–1.2)
BUN SERPL-MCNC: 8 MG/DL (ref 8–23)
CALCIUM SERPL-MCNC: 9.3 MG/DL (ref 8.8–10.2)
CHLORIDE SERPL-SCNC: 106 MMOL/L (ref 98–107)
CO2 SERPL-SCNC: 27 MMOL/L (ref 20–29)
CREAT SERPL-MCNC: 1.03 MG/DL (ref 0.8–1.3)
CRP SERPL-MCNC: 1.7 MG/DL (ref 0–0.4)
DIFFERENTIAL METHOD BLD: ABNORMAL
EOSINOPHIL # BLD: 0 K/UL (ref 0–0.8)
EOSINOPHIL NFR BLD: 0 % (ref 0.5–7.8)
ERYTHROCYTE [DISTWIDTH] IN BLOOD BY AUTOMATED COUNT: 13.2 % (ref 11.9–14.6)
GLOBULIN SER CALC-MCNC: 3.4 G/DL (ref 2.3–3.5)
GLUCOSE SERPL-MCNC: 83 MG/DL (ref 70–99)
HCT VFR BLD AUTO: 40.1 % (ref 41.1–50.3)
HGB BLD-MCNC: 13 G/DL (ref 13.6–17.2)
IMM GRANULOCYTES # BLD AUTO: 0.1 K/UL (ref 0–0.5)
IMM GRANULOCYTES NFR BLD AUTO: 2 % (ref 0–5)
LYMPHOCYTES # BLD: 2.5 K/UL (ref 0.5–4.6)
LYMPHOCYTES NFR BLD: 50 % (ref 13–44)
MCH RBC QN AUTO: 30 PG (ref 26.1–32.9)
MCHC RBC AUTO-ENTMCNC: 32.4 G/DL (ref 31.4–35)
MCV RBC AUTO: 92.6 FL (ref 82–102)
MONOCYTES # BLD: 0.6 K/UL (ref 0.1–1.3)
MONOCYTES NFR BLD: 12 % (ref 4–12)
NEUTS SEG # BLD: 1.7 K/UL (ref 1.7–8.2)
NEUTS SEG NFR BLD: 35 % (ref 43–78)
NRBC # BLD: 0 K/UL (ref 0–0.2)
PLATELET # BLD AUTO: 212 K/UL (ref 150–450)
PMV BLD AUTO: 8.7 FL (ref 9.4–12.3)
POTASSIUM SERPL-SCNC: 3.8 MMOL/L (ref 3.5–5.1)
PROT SERPL-MCNC: 6.8 G/DL (ref 6.3–8.2)
RBC # BLD AUTO: 4.33 M/UL (ref 4.23–5.6)
SODIUM SERPL-SCNC: 142 MMOL/L (ref 136–145)
WBC # BLD AUTO: 4.9 K/UL (ref 4.3–11.1)

## 2024-11-22 LAB
M TB IFN-G BLD-IMP: NEGATIVE
M TB IFN-G CD4+ T-CELLS BLD-ACNC: 0.07 IU/ML
M TBIFN-G CD4+ CD8+T-CELLS BLD-ACNC: 0.07 IU/ML
QUANTIFERON CRITERIA: NORMAL
QUANTIFERON MITOGEN VALUE: >10 IU/ML
QUANTIFERON NIL VALUE: 0.06 IU/ML

## 2024-11-29 ENCOUNTER — TELEPHONE (OUTPATIENT)
Dept: RHEUMATOLOGY | Age: 64
End: 2024-11-29

## 2024-11-29 DIAGNOSIS — M06.09 RHEUMATOID ARTHRITIS, SERONEGATIVE, MULTIPLE SITES (HCC): Primary | ICD-10-CM

## 2024-11-29 NOTE — TELEPHONE ENCOUNTER
VV 11/14  Appt 1/23, see pt message below, phoned in Pred taper per your protocol, thanks    My right arm and hand is hurting me so bad I can’t hardly move it it’s swollen.  I can’t even get my clothes on so I’m wandering   if you can give me any advice of what I should do? It’s been getting worse since yesterday morning I haven’t been able to sleep more than a hour or a hour and a half! My left arm I can’t even reach over my head

## 2024-12-02 RX ORDER — PREDNISONE 20 MG/1
TABLET ORAL
Qty: 11 TABLET | Refills: 0 | OUTPATIENT
Start: 2024-12-02

## 2024-12-02 NOTE — TELEPHONE ENCOUNTER
Burst and taper of prednisone phoned in by my MA as per protocol.  Jumana can you check and see if his IV Remicade has been approved.  Since I wanted him to be switched from subcu Kevzara to IV Remicade at his last office visit on 11/14/2024.  Once the Remicade is approved he needs to come in for his first infusion but I have put in a CRP to be drawn at that infusion visit.  Thanks

## 2024-12-04 NOTE — TELEPHONE ENCOUNTER
Spoke with [employer]. They have his application and are currently processing. Will follow up again in a few days if I do not hear from them.

## 2025-01-13 PROBLEM — M06.09 RHEUMATOID ARTHRITIS, SERONEGATIVE, MULTIPLE SITES (HCC): Status: ACTIVE | Noted: 2025-01-13

## 2025-01-13 RX ORDER — ALBUTEROL SULFATE 90 UG/1
4 INHALANT RESPIRATORY (INHALATION) PRN
Status: CANCELLED | OUTPATIENT
Start: 2025-01-14

## 2025-01-13 RX ORDER — ACETAMINOPHEN 325 MG/1
650 TABLET ORAL ONCE
Status: CANCELLED | OUTPATIENT
Start: 2025-01-14 | End: 2025-01-14

## 2025-01-13 RX ORDER — ACETAMINOPHEN 325 MG/1
650 TABLET ORAL ONCE
OUTPATIENT
Start: 2025-01-14 | End: 2025-01-14

## 2025-01-13 RX ORDER — SODIUM CHLORIDE 0.9 % (FLUSH) 0.9 %
5-40 SYRINGE (ML) INJECTION PRN
Status: CANCELLED | OUTPATIENT
Start: 2025-01-14

## 2025-01-13 RX ORDER — ACETAMINOPHEN 325 MG/1
650 TABLET ORAL
Status: CANCELLED | OUTPATIENT
Start: 2025-01-14

## 2025-01-13 RX ORDER — EPINEPHRINE 1 MG/ML
0.3 INJECTION, SOLUTION, CONCENTRATE INTRAVENOUS PRN
Status: CANCELLED | OUTPATIENT
Start: 2025-01-14

## 2025-01-13 RX ORDER — SODIUM CHLORIDE 9 MG/ML
INJECTION, SOLUTION INTRAVENOUS CONTINUOUS
Status: CANCELLED | OUTPATIENT
Start: 2025-01-14

## 2025-01-13 RX ORDER — INFLIXIMAB 100 MG/10ML
3 INJECTION, POWDER, LYOPHILIZED, FOR SOLUTION INTRAVENOUS ONCE
Status: CANCELLED
Start: 2025-01-14 | End: 2025-01-14

## 2025-01-13 RX ORDER — DIPHENHYDRAMINE HYDROCHLORIDE 50 MG/ML
50 INJECTION INTRAMUSCULAR; INTRAVENOUS
Status: CANCELLED | OUTPATIENT
Start: 2025-01-14

## 2025-01-13 RX ORDER — SODIUM CHLORIDE 9 MG/ML
5-250 INJECTION, SOLUTION INTRAVENOUS PRN
Status: CANCELLED | OUTPATIENT
Start: 2025-01-14

## 2025-01-13 RX ORDER — DIPHENHYDRAMINE HCL 25 MG
25 TABLET ORAL ONCE
OUTPATIENT
Start: 2025-01-14 | End: 2025-01-14

## 2025-01-13 RX ORDER — HEPARIN SODIUM (PORCINE) LOCK FLUSH IV SOLN 100 UNIT/ML 100 UNIT/ML
500 SOLUTION INTRAVENOUS PRN
Status: CANCELLED | OUTPATIENT
Start: 2025-01-14

## 2025-01-13 RX ORDER — DIPHENHYDRAMINE HCL 25 MG
25 TABLET ORAL ONCE
Status: CANCELLED | OUTPATIENT
Start: 2025-01-14 | End: 2025-01-14

## 2025-01-13 RX ORDER — HYDROCORTISONE SODIUM SUCCINATE 100 MG/2ML
100 INJECTION INTRAMUSCULAR; INTRAVENOUS
Status: CANCELLED | OUTPATIENT
Start: 2025-01-14

## 2025-01-13 RX ORDER — ONDANSETRON 2 MG/ML
8 INJECTION INTRAMUSCULAR; INTRAVENOUS
Status: CANCELLED | OUTPATIENT
Start: 2025-01-14

## 2025-01-13 RX ORDER — FAMOTIDINE 10 MG/ML
20 INJECTION, SOLUTION INTRAVENOUS
Status: CANCELLED | OUTPATIENT
Start: 2025-01-14

## 2025-01-14 ENCOUNTER — NURSE ONLY (OUTPATIENT)
Dept: RHEUMATOLOGY | Age: 65
End: 2025-01-14
Payer: MEDICARE

## 2025-01-14 VITALS
SYSTOLIC BLOOD PRESSURE: 156 MMHG | WEIGHT: 169.8 LBS | HEART RATE: 77 BPM | BODY MASS INDEX: 26.59 KG/M2 | DIASTOLIC BLOOD PRESSURE: 106 MMHG | TEMPERATURE: 97.6 F

## 2025-01-14 DIAGNOSIS — M06.09 RHEUMATOID ARTHRITIS, SERONEGATIVE, MULTIPLE SITES (HCC): Primary | ICD-10-CM

## 2025-01-14 LAB — CRP SERPL-MCNC: 0.9 MG/DL (ref 0–0.4)

## 2025-01-14 PROCEDURE — 96413 CHEMO IV INFUSION 1 HR: CPT | Performed by: INTERNAL MEDICINE

## 2025-01-14 PROCEDURE — 96415 CHEMO IV INFUSION ADDL HR: CPT | Performed by: INTERNAL MEDICINE

## 2025-01-14 RX ORDER — ACETAMINOPHEN 325 MG/1
650 TABLET ORAL
OUTPATIENT
Start: 2025-01-28

## 2025-01-14 RX ORDER — HEPARIN SODIUM (PORCINE) LOCK FLUSH IV SOLN 100 UNIT/ML 100 UNIT/ML
500 SOLUTION INTRAVENOUS PRN
OUTPATIENT
Start: 2025-01-28

## 2025-01-14 RX ORDER — ACETAMINOPHEN 325 MG/1
650 TABLET ORAL
Status: DISCONTINUED | OUTPATIENT
Start: 2025-01-14 | End: 2025-01-14 | Stop reason: HOSPADM

## 2025-01-14 RX ORDER — FAMOTIDINE 10 MG/ML
20 INJECTION, SOLUTION INTRAVENOUS
OUTPATIENT
Start: 2025-01-28

## 2025-01-14 RX ORDER — HYDROCORTISONE SODIUM SUCCINATE 100 MG/2ML
100 INJECTION INTRAMUSCULAR; INTRAVENOUS
OUTPATIENT
Start: 2025-01-28

## 2025-01-14 RX ORDER — ONDANSETRON 2 MG/ML
8 INJECTION INTRAMUSCULAR; INTRAVENOUS
OUTPATIENT
Start: 2025-01-28

## 2025-01-14 RX ORDER — ONDANSETRON 2 MG/ML
8 INJECTION INTRAMUSCULAR; INTRAVENOUS
Status: DISCONTINUED | OUTPATIENT
Start: 2025-01-14 | End: 2025-01-14 | Stop reason: HOSPADM

## 2025-01-14 RX ORDER — SODIUM CHLORIDE 9 MG/ML
5-250 INJECTION, SOLUTION INTRAVENOUS PRN
OUTPATIENT
Start: 2025-01-28

## 2025-01-14 RX ORDER — SODIUM CHLORIDE 0.9 % (FLUSH) 0.9 %
5-40 SYRINGE (ML) INJECTION PRN
Status: DISCONTINUED | OUTPATIENT
Start: 2025-01-14 | End: 2025-01-14 | Stop reason: HOSPADM

## 2025-01-14 RX ORDER — ACETAMINOPHEN 325 MG/1
650 TABLET ORAL ONCE
OUTPATIENT
Start: 2025-01-28 | End: 2025-01-28

## 2025-01-14 RX ORDER — DIPHENHYDRAMINE HCL 25 MG
25 TABLET ORAL ONCE
OUTPATIENT
Start: 2025-01-28 | End: 2025-01-28

## 2025-01-14 RX ORDER — FAMOTIDINE 10 MG/ML
20 INJECTION, SOLUTION INTRAVENOUS
Status: DISCONTINUED | OUTPATIENT
Start: 2025-01-14 | End: 2025-01-14 | Stop reason: HOSPADM

## 2025-01-14 RX ORDER — ACETAMINOPHEN 325 MG/1
650 TABLET ORAL ONCE
Status: DISCONTINUED | OUTPATIENT
Start: 2025-01-14 | End: 2025-01-14 | Stop reason: HOSPADM

## 2025-01-14 RX ORDER — EPINEPHRINE 1 MG/ML
0.3 INJECTION, SOLUTION, CONCENTRATE INTRAVENOUS PRN
OUTPATIENT
Start: 2025-01-28

## 2025-01-14 RX ORDER — SODIUM CHLORIDE 9 MG/ML
INJECTION, SOLUTION INTRAVENOUS CONTINUOUS
Status: DISCONTINUED | OUTPATIENT
Start: 2025-01-14 | End: 2025-01-14 | Stop reason: HOSPADM

## 2025-01-14 RX ORDER — ALBUTEROL SULFATE 90 UG/1
4 INHALANT RESPIRATORY (INHALATION) PRN
Status: DISCONTINUED | OUTPATIENT
Start: 2025-01-14 | End: 2025-01-14 | Stop reason: HOSPADM

## 2025-01-14 RX ORDER — SODIUM CHLORIDE 9 MG/ML
INJECTION, SOLUTION INTRAVENOUS CONTINUOUS
OUTPATIENT
Start: 2025-01-28

## 2025-01-14 RX ORDER — DIPHENHYDRAMINE HYDROCHLORIDE 50 MG/ML
50 INJECTION INTRAMUSCULAR; INTRAVENOUS
OUTPATIENT
Start: 2025-01-28

## 2025-01-14 RX ORDER — HYDROCORTISONE SODIUM SUCCINATE 100 MG/2ML
100 INJECTION INTRAMUSCULAR; INTRAVENOUS
Status: DISCONTINUED | OUTPATIENT
Start: 2025-01-14 | End: 2025-01-14 | Stop reason: HOSPADM

## 2025-01-14 RX ORDER — DIPHENHYDRAMINE HCL 25 MG
25 TABLET ORAL ONCE
Status: DISCONTINUED | OUTPATIENT
Start: 2025-01-14 | End: 2025-01-14 | Stop reason: HOSPADM

## 2025-01-14 RX ORDER — INFLIXIMAB 100 MG/10ML
3 INJECTION, POWDER, LYOPHILIZED, FOR SOLUTION INTRAVENOUS ONCE
Status: CANCELLED
Start: 2025-01-28 | End: 2025-01-28

## 2025-01-14 RX ORDER — SODIUM CHLORIDE 9 MG/ML
5-250 INJECTION, SOLUTION INTRAVENOUS PRN
Status: DISCONTINUED | OUTPATIENT
Start: 2025-01-14 | End: 2025-01-14 | Stop reason: HOSPADM

## 2025-01-14 RX ORDER — EPINEPHRINE 1 MG/ML
0.3 INJECTION, SOLUTION, CONCENTRATE INTRAVENOUS PRN
Status: DISCONTINUED | OUTPATIENT
Start: 2025-01-14 | End: 2025-01-14 | Stop reason: HOSPADM

## 2025-01-14 RX ORDER — HEPARIN SODIUM (PORCINE) LOCK FLUSH IV SOLN 100 UNIT/ML 100 UNIT/ML
500 SOLUTION INTRAVENOUS PRN
Status: DISCONTINUED | OUTPATIENT
Start: 2025-01-14 | End: 2025-01-14 | Stop reason: HOSPADM

## 2025-01-14 RX ORDER — ALBUTEROL SULFATE 90 UG/1
4 INHALANT RESPIRATORY (INHALATION) PRN
OUTPATIENT
Start: 2025-01-28

## 2025-01-14 RX ORDER — DIPHENHYDRAMINE HYDROCHLORIDE 50 MG/ML
50 INJECTION INTRAMUSCULAR; INTRAVENOUS
Status: DISCONTINUED | OUTPATIENT
Start: 2025-01-14 | End: 2025-01-14 | Stop reason: HOSPADM

## 2025-01-14 RX ORDER — SODIUM CHLORIDE 0.9 % (FLUSH) 0.9 %
5-40 SYRINGE (ML) INJECTION PRN
OUTPATIENT
Start: 2025-01-28

## 2025-01-14 RX ORDER — INFLIXIMAB 100 MG/10ML
3 INJECTION, POWDER, LYOPHILIZED, FOR SOLUTION INTRAVENOUS ONCE
Status: COMPLETED | OUTPATIENT
Start: 2025-01-14 | End: 2025-01-14

## 2025-01-14 RX ADMIN — INFLIXIMAB 200 MG: 100 INJECTION, POWDER, LYOPHILIZED, FOR SOLUTION INTRAVENOUS at 11:05

## 2025-01-14 NOTE — PROGRESS NOTES
[facility]  [address] [place] [address], [address]  [place] [address]  Office : [phone], Fax: [phone]       Remicade infusion 1/14/2025 200mg =2.6mg/kg infused in 0.9% NaCl. 0 mg of Remicade was wasted. Patient tolerated the infusion without complications. Infusion placed in LFA vein.  IV inserted by Thu Evans RN  Next Infusion in 2 weeks- 1/27/25    IV insertion time: 1045  Medication start time: 1105  Medication completion time: 1315    Patient Medication Supplied? yes, Rand    Patient discharged feeling well and instructed to call the office with any post-infusion issues.    Pre-Infusion Questionnaire  Has your insurance changed or have you received a new card since your last visit?  yes, [employer] PPO changed to [employer] HMO  Have you had any infections since your last infusion?   no  Since your last visit, have you been hospitalized, been to the ER, or Urgent Care Center for any reason?  no  Have you recently had GI problems, open wounds, urinary problems, or chest pain?   no  Are you currently taking antibiotics?   no  Have you recently had or are you scheduled for any surgical procedures?   no  Have you had a TB test in the last year?   yes, 11/18/24  Did you premedicate for today's infusion?  no  Have you received any vaccinations in the last 6 months, or planning to receive in the next 3 months: COVID, Flu, Pheumonia, Shingles?  yes, flu vaccine  When was your last appointment with Dr. Uriarte, Dr. Leach, or Dr. Rebolledo?  11/14/24  When was your last infusion?        N/a  12. Are you in skilled nursing facility, Rehab, or living at a nursing home?      no    Reviewed and Confirmed by Thee Sue

## 2025-01-27 ENCOUNTER — NURSE ONLY (OUTPATIENT)
Dept: RHEUMATOLOGY | Age: 65
End: 2025-01-27
Payer: MEDICARE

## 2025-01-27 VITALS
HEART RATE: 61 BPM | WEIGHT: 165.8 LBS | BODY MASS INDEX: 25.97 KG/M2 | SYSTOLIC BLOOD PRESSURE: 154 MMHG | DIASTOLIC BLOOD PRESSURE: 110 MMHG | TEMPERATURE: 97.8 F

## 2025-01-27 DIAGNOSIS — M06.09 RHEUMATOID ARTHRITIS, SERONEGATIVE, MULTIPLE SITES (HCC): Primary | ICD-10-CM

## 2025-01-27 PROCEDURE — 96413 CHEMO IV INFUSION 1 HR: CPT | Performed by: INTERNAL MEDICINE

## 2025-01-27 PROCEDURE — 96415 CHEMO IV INFUSION ADDL HR: CPT | Performed by: INTERNAL MEDICINE

## 2025-01-27 RX ORDER — INFLIXIMAB 100 MG/10ML
3 INJECTION, POWDER, LYOPHILIZED, FOR SOLUTION INTRAVENOUS ONCE
Status: DISCONTINUED | OUTPATIENT
Start: 2025-01-27 | End: 2025-01-27 | Stop reason: HOSPADM

## 2025-01-27 RX ORDER — EPINEPHRINE 1 MG/ML
0.3 INJECTION, SOLUTION, CONCENTRATE INTRAVENOUS PRN
Status: DISCONTINUED | OUTPATIENT
Start: 2025-01-27 | End: 2025-01-27 | Stop reason: HOSPADM

## 2025-01-27 RX ORDER — SODIUM CHLORIDE 9 MG/ML
5-250 INJECTION, SOLUTION INTRAVENOUS PRN
OUTPATIENT
Start: 2025-02-25

## 2025-01-27 RX ORDER — DIPHENHYDRAMINE HCL 25 MG
25 TABLET ORAL ONCE
Status: CANCELLED | OUTPATIENT
Start: 2025-02-25 | End: 2025-02-25

## 2025-01-27 RX ORDER — HYDROCORTISONE SODIUM SUCCINATE 100 MG/2ML
100 INJECTION INTRAMUSCULAR; INTRAVENOUS
Status: DISCONTINUED | OUTPATIENT
Start: 2025-01-27 | End: 2025-01-27 | Stop reason: HOSPADM

## 2025-01-27 RX ORDER — HEPARIN SODIUM (PORCINE) LOCK FLUSH IV SOLN 100 UNIT/ML 100 UNIT/ML
500 SOLUTION INTRAVENOUS PRN
OUTPATIENT
Start: 2025-02-25

## 2025-01-27 RX ORDER — SODIUM CHLORIDE 9 MG/ML
INJECTION, SOLUTION INTRAVENOUS CONTINUOUS
OUTPATIENT
Start: 2025-02-25

## 2025-01-27 RX ORDER — ACETAMINOPHEN 325 MG/1
650 TABLET ORAL ONCE
Status: DISCONTINUED | OUTPATIENT
Start: 2025-01-27 | End: 2025-01-27 | Stop reason: HOSPADM

## 2025-01-27 RX ORDER — SODIUM CHLORIDE 9 MG/ML
INJECTION, SOLUTION INTRAVENOUS CONTINUOUS
Status: DISCONTINUED | OUTPATIENT
Start: 2025-01-27 | End: 2025-01-27 | Stop reason: HOSPADM

## 2025-01-27 RX ORDER — FAMOTIDINE 10 MG/ML
20 INJECTION, SOLUTION INTRAVENOUS
OUTPATIENT
Start: 2025-02-25

## 2025-01-27 RX ORDER — ONDANSETRON 2 MG/ML
8 INJECTION INTRAMUSCULAR; INTRAVENOUS
OUTPATIENT
Start: 2025-02-25

## 2025-01-27 RX ORDER — DIPHENHYDRAMINE HCL 25 MG
25 TABLET ORAL ONCE
Status: DISCONTINUED | OUTPATIENT
Start: 2025-01-27 | End: 2025-01-27 | Stop reason: HOSPADM

## 2025-01-27 RX ORDER — DIPHENHYDRAMINE HCL 25 MG
25 TABLET ORAL ONCE
OUTPATIENT
Start: 2025-02-25 | End: 2025-02-25

## 2025-01-27 RX ORDER — HYDROCORTISONE SODIUM SUCCINATE 100 MG/2ML
100 INJECTION INTRAMUSCULAR; INTRAVENOUS
OUTPATIENT
Start: 2025-02-25

## 2025-01-27 RX ORDER — ACETAMINOPHEN 325 MG/1
650 TABLET ORAL ONCE
Status: CANCELLED | OUTPATIENT
Start: 2025-02-25 | End: 2025-02-25

## 2025-01-27 RX ORDER — ALBUTEROL SULFATE 90 UG/1
4 INHALANT RESPIRATORY (INHALATION) PRN
Status: DISCONTINUED | OUTPATIENT
Start: 2025-01-27 | End: 2025-01-27 | Stop reason: HOSPADM

## 2025-01-27 RX ORDER — FAMOTIDINE 10 MG/ML
20 INJECTION, SOLUTION INTRAVENOUS
Status: DISCONTINUED | OUTPATIENT
Start: 2025-01-27 | End: 2025-01-27 | Stop reason: HOSPADM

## 2025-01-27 RX ORDER — DIPHENHYDRAMINE HYDROCHLORIDE 50 MG/ML
50 INJECTION INTRAMUSCULAR; INTRAVENOUS
Status: DISCONTINUED | OUTPATIENT
Start: 2025-01-27 | End: 2025-01-27 | Stop reason: HOSPADM

## 2025-01-27 RX ORDER — INFLIXIMAB 100 MG/10ML
3 INJECTION, POWDER, LYOPHILIZED, FOR SOLUTION INTRAVENOUS ONCE
Status: COMPLETED | OUTPATIENT
Start: 2025-01-27 | End: 2025-01-27

## 2025-01-27 RX ORDER — ACETAMINOPHEN 325 MG/1
650 TABLET ORAL
Status: DISCONTINUED | OUTPATIENT
Start: 2025-01-27 | End: 2025-01-27 | Stop reason: HOSPADM

## 2025-01-27 RX ORDER — SODIUM CHLORIDE 0.9 % (FLUSH) 0.9 %
5-40 SYRINGE (ML) INJECTION PRN
OUTPATIENT
Start: 2025-02-25

## 2025-01-27 RX ORDER — ALBUTEROL SULFATE 90 UG/1
4 INHALANT RESPIRATORY (INHALATION) PRN
OUTPATIENT
Start: 2025-02-25

## 2025-01-27 RX ORDER — ACETAMINOPHEN 325 MG/1
650 TABLET ORAL
OUTPATIENT
Start: 2025-02-25

## 2025-01-27 RX ORDER — DIPHENHYDRAMINE HYDROCHLORIDE 50 MG/ML
50 INJECTION INTRAMUSCULAR; INTRAVENOUS
OUTPATIENT
Start: 2025-02-25

## 2025-01-27 RX ORDER — EPINEPHRINE 1 MG/ML
0.3 INJECTION, SOLUTION, CONCENTRATE INTRAVENOUS PRN
OUTPATIENT
Start: 2025-02-25

## 2025-01-27 RX ORDER — ONDANSETRON 2 MG/ML
8 INJECTION INTRAMUSCULAR; INTRAVENOUS
Status: DISCONTINUED | OUTPATIENT
Start: 2025-01-27 | End: 2025-01-27 | Stop reason: HOSPADM

## 2025-01-27 RX ORDER — ACETAMINOPHEN 325 MG/1
650 TABLET ORAL ONCE
OUTPATIENT
Start: 2025-02-25 | End: 2025-02-25

## 2025-01-27 RX ADMIN — INFLIXIMAB 200 MG: 100 INJECTION, POWDER, LYOPHILIZED, FOR SOLUTION INTRAVENOUS at 10:20

## 2025-01-27 NOTE — PROGRESS NOTES
[facility]  [address] [place] [address], [address]  [place] [address]  Office : [phone], Fax: [phone]       Remicade infusion 1/27/2025 200 mg = 2.7 mg/kg infused in 0.9% NaCl. 0 mg of Remicade was wasted. Patient tolerated the infusion without complications. Infusion placed in LFA vein.  IV inserted by Fabienne Tran RN  Next Infusion in 4 weeks on 2/24/2025    IV insertion time: 1010  Medication start time: 1020  Medication completion time: 1225    Patient Medication Supplied? yes, pt medication supplied by [employer]    Patient discharged feeling fine and instructed to call the office with any post-infusion issues.    Pre-Infusion Questionnaire  Has your insurance changed or have you received a new card since your last visit?  no  Have you had any infections since your last infusion?   no  Since your last visit, have you been hospitalized, been to the ER, or Urgent Care Center for any reason?  no  Have you recently had GI problems, open wounds, urinary problems, or chest pain?   no  Are you currently taking antibiotics?   no  Have you recently had or are you scheduled for any surgical procedures?   no  Have you had a TB test in the last year?   yes, 11/18/2024 (negative)  Did you premedicate for today's infusion?  no  Have you received any vaccinations in the last 6 months, or planning to receive in the next 3 months: COVID, Flu, Pheumonia, Shingles?  no  When was your last appointment with Dr. Uriarte, Dr. Leach, or Dr. Rebolledo?  11/14/2024  When was your last infusion?        1/14/2025  12. Are you in skilled nursing facility, Rehab, or living at a nursing home?      no    Reviewed and Confirmed by Thee Sue

## 2025-02-05 ENCOUNTER — OFFICE VISIT (OUTPATIENT)
Dept: RHEUMATOLOGY | Age: 65
End: 2025-02-05
Payer: MEDICARE

## 2025-02-05 VITALS
BODY MASS INDEX: 25.71 KG/M2 | OXYGEN SATURATION: 95 % | HEART RATE: 69 BPM | WEIGHT: 163.8 LBS | HEIGHT: 67 IN | SYSTOLIC BLOOD PRESSURE: 132 MMHG | DIASTOLIC BLOOD PRESSURE: 84 MMHG

## 2025-02-05 DIAGNOSIS — Z79.899 LONG-TERM USE OF HIGH-RISK MEDICATION: ICD-10-CM

## 2025-02-05 DIAGNOSIS — M06.09 RHEUMATOID ARTHRITIS, SERONEGATIVE, MULTIPLE SITES (HCC): Primary | ICD-10-CM

## 2025-02-05 PROCEDURE — 99214 OFFICE O/P EST MOD 30 MIN: CPT | Performed by: INTERNAL MEDICINE

## 2025-02-05 PROCEDURE — 1036F TOBACCO NON-USER: CPT | Performed by: INTERNAL MEDICINE

## 2025-02-05 PROCEDURE — 1123F ACP DISCUSS/DSCN MKR DOCD: CPT | Performed by: INTERNAL MEDICINE

## 2025-02-05 PROCEDURE — G8427 DOCREV CUR MEDS BY ELIG CLIN: HCPCS | Performed by: INTERNAL MEDICINE

## 2025-02-05 PROCEDURE — G2211 COMPLEX E/M VISIT ADD ON: HCPCS | Performed by: INTERNAL MEDICINE

## 2025-02-05 PROCEDURE — G8417 CALC BMI ABV UP PARAM F/U: HCPCS | Performed by: INTERNAL MEDICINE

## 2025-02-05 PROCEDURE — 3017F COLORECTAL CA SCREEN DOC REV: CPT | Performed by: INTERNAL MEDICINE

## 2025-02-05 ASSESSMENT — ROUTINE ASSESSMENT OF PATIENT INDEX DATA (RAPID3)
ON A SCALE OF ONE TO TEN, HOW DIFFICULT WAS IT FOR YOU TO COMPLETE THE LISTED DAILY PHYSICAL TASKS OVER THE LAST WEEK: 1.2
ON A SCALE OF ONE TO TEN, HOW MUCH OF A PROBLEM HAS UNUSUAL FATIGUE OR TIREDNESS BEEN FOR YOU OVER THE PAST WEEK?: 7
WHEN YOU AWAKENED IN THE MORNING OVER THE LAST WEEK, PLEASE INDICATE THE AMOUNT OF TIME IT TAKES UNTIL YOU ARE AS LIMBER AS YOU WILL BE FOR THE DAY: 1 HOUR
ON A SCALE OF ONE TO TEN, CONSIDERING ALL THE WAYS IN WHICH ILLNESS AND HEALTH CONDITIONS MAY AFFECT YOU AT THIS TIME, PLEASE INDICATE BELOW HOW YOU ARE DOING:: 7
ON A SCALE OF ONE TO TEN, HOW MUCH PAIN HAVE YOU HAD BECAUSE OF YOUR CONDITION OVER THE PAST WEEK?: 7

## 2025-02-05 NOTE — PROGRESS NOTES
[facility]  Chery Uriarte M.D.  [address] , [address]   [place]  Office : [phone], Fax: [phone]     RHEUMATOLOGY OFFICE VISIT NOTE  Date of Visit:  2025 2:59 PM    Patient Information:  Name:  Thee Sue  :  1960  Age:  64 y.o.   Gender:  male      Mr. Sue is here today for follow-up of RA and medication monitoring.      Last visit: 24    History of Present Illness: On talking to the patient today he states that he has had more weakness worse in the left arm than the right arm.  Since starting the IV Remicade he does believe that his joint pain and swelling has started to get better.  On talking to him further he states that he has not had any associated LE weakness, but does feel that he is losing his overall strength.  Patient's current joint complaints are as mentioned below.      Since the last visit, patient is feeling \"poor\".    Pain: 6/10  Location:  Bilateral shoulder and para spinal muscle pain. No neck stiffness with no pain with neck ROM. Occasional tension headaches. Some mid back and lower back pain. No hip pain with no groin pain. Some swelling of his calves. No knee pain with no swelling with no warmth and redness with  no buckling of the knees. Bilateral wrist pain with swelling with no warmth and redness. Some pain with no swelling involving the MCP and PIP joints with no warmth and redness. Bilateral thumb pain with no swelling, warmth and redness.   Quality:  Throbbing pain.   Modifying Factors:  1st thing in the morning the stiffness is the worst and movement eases the stiffness. Overdoing things worsens the pain.   Associated Symptoms:  Intermittent tingling and numbness with no pain down the arms from the shoulders to the fingertips with no pain, tingling and numbness down his legs. Has difficulty opening jars with difficulty buttoning and unbuttoning.         2025     2:00 PM   DMARD/Biologic

## 2025-02-06 RX ORDER — LEFLUNOMIDE 20 MG/1
TABLET ORAL
Qty: 90 TABLET | Refills: 1 | Status: SHIPPED | OUTPATIENT
Start: 2025-02-06

## 2025-02-06 ASSESSMENT — JOINT PAIN
TOTAL NUMBER OF SWOLLEN JOINTS: 2
TOTAL NUMBER OF TENDER JOINTS: 4

## 2025-02-24 ENCOUNTER — NURSE ONLY (OUTPATIENT)
Dept: RHEUMATOLOGY | Age: 65
End: 2025-02-24
Payer: MEDICARE

## 2025-02-24 VITALS
DIASTOLIC BLOOD PRESSURE: 94 MMHG | SYSTOLIC BLOOD PRESSURE: 138 MMHG | BODY MASS INDEX: 25.62 KG/M2 | WEIGHT: 163.6 LBS | TEMPERATURE: 97.7 F | HEART RATE: 67 BPM

## 2025-02-24 DIAGNOSIS — M06.09 RHEUMATOID ARTHRITIS, SERONEGATIVE, MULTIPLE SITES (HCC): Primary | ICD-10-CM

## 2025-02-24 DIAGNOSIS — Z79.899 LONG-TERM USE OF HIGH-RISK MEDICATION: ICD-10-CM

## 2025-02-24 DIAGNOSIS — M06.09 RHEUMATOID ARTHRITIS, SERONEGATIVE, MULTIPLE SITES (HCC): ICD-10-CM

## 2025-02-24 LAB
ALBUMIN SERPL-MCNC: 3.6 G/DL (ref 3.2–4.6)
ALBUMIN/GLOB SERPL: 1.1 (ref 1–1.9)
ALP SERPL-CCNC: 122 U/L (ref 40–129)
ALT SERPL-CCNC: 14 U/L (ref 8–55)
ANION GAP SERPL CALC-SCNC: 11 MMOL/L (ref 7–16)
AST SERPL-CCNC: 25 U/L (ref 15–37)
BASOPHILS # BLD: 0.09 K/UL (ref 0–0.2)
BASOPHILS NFR BLD: 1.9 % (ref 0–2)
BILIRUB SERPL-MCNC: 0.3 MG/DL (ref 0–1.2)
BUN SERPL-MCNC: 8 MG/DL (ref 8–23)
CALCIUM SERPL-MCNC: 9.3 MG/DL (ref 8.8–10.2)
CHLORIDE SERPL-SCNC: 104 MMOL/L (ref 98–107)
CO2 SERPL-SCNC: 24 MMOL/L (ref 20–29)
CREAT SERPL-MCNC: 0.98 MG/DL (ref 0.8–1.3)
CRP SERPL-MCNC: 0.6 MG/DL (ref 0–0.4)
DIFFERENTIAL METHOD BLD: ABNORMAL
EOSINOPHIL # BLD: 0 K/UL (ref 0–0.8)
EOSINOPHIL NFR BLD: 0 % (ref 0.5–7.8)
ERYTHROCYTE [DISTWIDTH] IN BLOOD BY AUTOMATED COUNT: 13.7 % (ref 11.9–14.6)
GLOBULIN SER CALC-MCNC: 3.4 G/DL (ref 2.3–3.5)
GLUCOSE SERPL-MCNC: 99 MG/DL (ref 70–99)
HCT VFR BLD AUTO: 41.9 % (ref 41.1–50.3)
HGB BLD-MCNC: 14.1 G/DL (ref 13.6–17.2)
IMM GRANULOCYTES # BLD AUTO: 0.08 K/UL (ref 0–0.5)
IMM GRANULOCYTES NFR BLD AUTO: 1.7 % (ref 0–5)
LYMPHOCYTES # BLD: 1.66 K/UL (ref 0.5–4.6)
LYMPHOCYTES NFR BLD: 34.9 % (ref 13–44)
MCH RBC QN AUTO: 30 PG (ref 26.1–32.9)
MCHC RBC AUTO-ENTMCNC: 33.7 G/DL (ref 31.4–35)
MCV RBC AUTO: 89.1 FL (ref 82–102)
MONOCYTES # BLD: 0.53 K/UL (ref 0.1–1.3)
MONOCYTES NFR BLD: 11.2 % (ref 4–12)
NEUTS SEG # BLD: 2.39 K/UL (ref 1.7–8.2)
NEUTS SEG NFR BLD: 50.3 % (ref 43–78)
NRBC # BLD: 0 K/UL (ref 0–0.2)
PLATELET # BLD AUTO: 230 K/UL (ref 150–450)
PMV BLD AUTO: 9.1 FL (ref 9.4–12.3)
POTASSIUM SERPL-SCNC: 4.3 MMOL/L (ref 3.5–5.1)
PROT SERPL-MCNC: 7 G/DL (ref 6.3–8.2)
RBC # BLD AUTO: 4.7 M/UL (ref 4.23–5.6)
SODIUM SERPL-SCNC: 139 MMOL/L (ref 136–145)
WBC # BLD AUTO: 4.8 K/UL (ref 4.3–11.1)

## 2025-02-24 PROCEDURE — 96415 CHEMO IV INFUSION ADDL HR: CPT | Performed by: INTERNAL MEDICINE

## 2025-02-24 PROCEDURE — 96413 CHEMO IV INFUSION 1 HR: CPT | Performed by: INTERNAL MEDICINE

## 2025-02-24 RX ORDER — SODIUM CHLORIDE 9 MG/ML
INJECTION, SOLUTION INTRAVENOUS CONTINUOUS
OUTPATIENT
Start: 2025-03-24

## 2025-02-24 RX ORDER — ALBUTEROL SULFATE 90 UG/1
4 INHALANT RESPIRATORY (INHALATION) PRN
OUTPATIENT
Start: 2025-03-24

## 2025-02-24 RX ORDER — ONDANSETRON 2 MG/ML
8 INJECTION INTRAMUSCULAR; INTRAVENOUS
OUTPATIENT
Start: 2025-03-24

## 2025-02-24 RX ORDER — DIPHENHYDRAMINE HYDROCHLORIDE 50 MG/ML
50 INJECTION INTRAMUSCULAR; INTRAVENOUS
OUTPATIENT
Start: 2025-03-24

## 2025-02-24 RX ORDER — EPINEPHRINE 1 MG/ML
0.3 INJECTION, SOLUTION, CONCENTRATE INTRAVENOUS PRN
Status: DISCONTINUED | OUTPATIENT
Start: 2025-02-24 | End: 2025-02-24 | Stop reason: HOSPADM

## 2025-02-24 RX ORDER — ACETAMINOPHEN 325 MG/1
650 TABLET ORAL ONCE
Status: DISCONTINUED | OUTPATIENT
Start: 2025-02-24 | End: 2025-02-24 | Stop reason: HOSPADM

## 2025-02-24 RX ORDER — HYDROCORTISONE SODIUM SUCCINATE 100 MG/2ML
100 INJECTION INTRAMUSCULAR; INTRAVENOUS
OUTPATIENT
Start: 2025-03-24

## 2025-02-24 RX ORDER — DIPHENHYDRAMINE HCL 25 MG
25 TABLET ORAL ONCE
OUTPATIENT
Start: 2025-03-24 | End: 2025-03-24

## 2025-02-24 RX ORDER — ONDANSETRON 2 MG/ML
8 INJECTION INTRAMUSCULAR; INTRAVENOUS
Status: DISCONTINUED | OUTPATIENT
Start: 2025-02-24 | End: 2025-02-24 | Stop reason: HOSPADM

## 2025-02-24 RX ORDER — DIPHENHYDRAMINE HCL 25 MG
25 TABLET ORAL ONCE
Status: DISCONTINUED | OUTPATIENT
Start: 2025-02-24 | End: 2025-02-24 | Stop reason: HOSPADM

## 2025-02-24 RX ORDER — FAMOTIDINE 10 MG/ML
20 INJECTION, SOLUTION INTRAVENOUS
OUTPATIENT
Start: 2025-03-24

## 2025-02-24 RX ORDER — SODIUM CHLORIDE 9 MG/ML
INJECTION, SOLUTION INTRAVENOUS CONTINUOUS
Status: DISCONTINUED | OUTPATIENT
Start: 2025-02-24 | End: 2025-02-24 | Stop reason: HOSPADM

## 2025-02-24 RX ORDER — ACETAMINOPHEN 325 MG/1
650 TABLET ORAL
OUTPATIENT
Start: 2025-03-24

## 2025-02-24 RX ORDER — INFLIXIMAB 100 MG/10ML
3 INJECTION, POWDER, LYOPHILIZED, FOR SOLUTION INTRAVENOUS ONCE
Status: COMPLETED | OUTPATIENT
Start: 2025-02-24 | End: 2025-02-24

## 2025-02-24 RX ORDER — HYDROCORTISONE SODIUM SUCCINATE 100 MG/2ML
100 INJECTION INTRAMUSCULAR; INTRAVENOUS
Status: DISCONTINUED | OUTPATIENT
Start: 2025-02-24 | End: 2025-02-24 | Stop reason: HOSPADM

## 2025-02-24 RX ORDER — SODIUM CHLORIDE 9 MG/ML
5-250 INJECTION, SOLUTION INTRAVENOUS PRN
OUTPATIENT
Start: 2025-03-24

## 2025-02-24 RX ORDER — ACETAMINOPHEN 325 MG/1
650 TABLET ORAL ONCE
OUTPATIENT
Start: 2025-03-24 | End: 2025-03-24

## 2025-02-24 RX ORDER — HEPARIN SODIUM (PORCINE) LOCK FLUSH IV SOLN 100 UNIT/ML 100 UNIT/ML
500 SOLUTION INTRAVENOUS PRN
OUTPATIENT
Start: 2025-03-24

## 2025-02-24 RX ORDER — ALBUTEROL SULFATE 90 UG/1
4 INHALANT RESPIRATORY (INHALATION) PRN
Status: DISCONTINUED | OUTPATIENT
Start: 2025-02-24 | End: 2025-02-24 | Stop reason: HOSPADM

## 2025-02-24 RX ORDER — FAMOTIDINE 10 MG/ML
20 INJECTION, SOLUTION INTRAVENOUS
Status: DISCONTINUED | OUTPATIENT
Start: 2025-02-24 | End: 2025-02-24 | Stop reason: HOSPADM

## 2025-02-24 RX ORDER — EPINEPHRINE 1 MG/ML
0.3 INJECTION, SOLUTION, CONCENTRATE INTRAVENOUS PRN
OUTPATIENT
Start: 2025-03-24

## 2025-02-24 RX ORDER — DIPHENHYDRAMINE HYDROCHLORIDE 50 MG/ML
50 INJECTION INTRAMUSCULAR; INTRAVENOUS
Status: DISCONTINUED | OUTPATIENT
Start: 2025-02-24 | End: 2025-02-24 | Stop reason: HOSPADM

## 2025-02-24 RX ORDER — SODIUM CHLORIDE 0.9 % (FLUSH) 0.9 %
5-40 SYRINGE (ML) INJECTION PRN
OUTPATIENT
Start: 2025-03-24

## 2025-02-24 RX ORDER — ACETAMINOPHEN 325 MG/1
650 TABLET ORAL
Status: DISCONTINUED | OUTPATIENT
Start: 2025-02-24 | End: 2025-02-24 | Stop reason: HOSPADM

## 2025-02-24 RX ADMIN — INFLIXIMAB 200 MG: 100 INJECTION, POWDER, LYOPHILIZED, FOR SOLUTION INTRAVENOUS at 10:30

## 2025-02-24 NOTE — PROGRESS NOTES
[facility]  [address] [place] [address], [address]  [place] [address]  Office : [phone], Fax: [phone]       Remicade infusion 2/24/2025 200mg =2.7mg/kg infused in 0.9% NaCl. 0 mg of Remicade was wasted. Patient tolerated the infusion without complications. Infusion placed in RFA vein.  IV inserted by Gail Antunez RN  Next Infusion in 8 weeks-     IV insertion time: 1016  Medication start time: 1030  Medication completion time: 1240    Patient Medication Supplied? yes, Rand    Patient discharged feeling well and instructed to call the office with any post-infusion issues.    Pre-Infusion Questionnaire  Has your insurance changed or have you received a new card since your last visit?  no  Have you had any infections since your last infusion?   no  Since your last visit, have you been hospitalized, been to the ER, or Urgent Care Center for any reason?  no  Have you recently had GI problems, open wounds, urinary problems, or chest pain?   no  Are you currently taking antibiotics?   no  Have you recently had or are you scheduled for any surgical procedures?   no  Have you had a TB test in the last year?   yes, 11/18/24  Did you premedicate for today's infusion?  no  Have you received any vaccinations in the last 6 months, or planning to receive in the next 3 months: COVID, Flu, Pheumonia, Shingles?  no  When was your last appointment with Dr. Uriarte, Dr. Leach, or Dr. Rebolledo?  2/5/25  When was your last infusion?        1/27/25  12. Are you in skilled nursing facility, Rehab, or living at a nursing home?      no    Reviewed and Confirmed by Thee Sue

## 2025-04-21 ENCOUNTER — CLINICAL SUPPORT (OUTPATIENT)
Dept: RHEUMATOLOGY | Age: 65
End: 2025-04-21
Payer: MEDICARE

## 2025-04-21 ENCOUNTER — OFFICE VISIT (OUTPATIENT)
Dept: RHEUMATOLOGY | Age: 65
End: 2025-04-21
Payer: MEDICARE

## 2025-04-21 VITALS
HEART RATE: 72 BPM | SYSTOLIC BLOOD PRESSURE: 94 MMHG | DIASTOLIC BLOOD PRESSURE: 74 MMHG | BODY MASS INDEX: 24.58 KG/M2 | WEIGHT: 156.6 LBS | HEIGHT: 67 IN | OXYGEN SATURATION: 92 %

## 2025-04-21 VITALS
BODY MASS INDEX: 24.52 KG/M2 | HEART RATE: 72 BPM | DIASTOLIC BLOOD PRESSURE: 74 MMHG | SYSTOLIC BLOOD PRESSURE: 94 MMHG | WEIGHT: 156.53 LBS | TEMPERATURE: 97.7 F

## 2025-04-21 DIAGNOSIS — M06.09 RHEUMATOID ARTHRITIS, SERONEGATIVE, MULTIPLE SITES (HCC): Primary | ICD-10-CM

## 2025-04-21 DIAGNOSIS — Z79.899 LONG-TERM USE OF HIGH-RISK MEDICATION: ICD-10-CM

## 2025-04-21 PROCEDURE — G8427 DOCREV CUR MEDS BY ELIG CLIN: HCPCS | Performed by: INTERNAL MEDICINE

## 2025-04-21 PROCEDURE — G8420 CALC BMI NORM PARAMETERS: HCPCS | Performed by: INTERNAL MEDICINE

## 2025-04-21 PROCEDURE — 96415 CHEMO IV INFUSION ADDL HR: CPT | Performed by: INTERNAL MEDICINE

## 2025-04-21 PROCEDURE — 99214 OFFICE O/P EST MOD 30 MIN: CPT | Performed by: INTERNAL MEDICINE

## 2025-04-21 PROCEDURE — 3017F COLORECTAL CA SCREEN DOC REV: CPT | Performed by: INTERNAL MEDICINE

## 2025-04-21 PROCEDURE — 1123F ACP DISCUSS/DSCN MKR DOCD: CPT | Performed by: INTERNAL MEDICINE

## 2025-04-21 PROCEDURE — 96413 CHEMO IV INFUSION 1 HR: CPT | Performed by: INTERNAL MEDICINE

## 2025-04-21 PROCEDURE — 1036F TOBACCO NON-USER: CPT | Performed by: INTERNAL MEDICINE

## 2025-04-21 RX ORDER — INFLIXIMAB 100 MG/10ML
3 INJECTION, POWDER, LYOPHILIZED, FOR SOLUTION INTRAVENOUS ONCE
Status: COMPLETED | OUTPATIENT
Start: 2025-04-21 | End: 2025-04-21

## 2025-04-21 RX ORDER — HEPARIN SODIUM (PORCINE) LOCK FLUSH IV SOLN 100 UNIT/ML 100 UNIT/ML
500 SOLUTION INTRAVENOUS PRN
OUTPATIENT
Start: 2025-04-22

## 2025-04-21 RX ORDER — ACETAMINOPHEN 325 MG/1
650 TABLET ORAL ONCE
OUTPATIENT
Start: 2025-04-22 | End: 2025-04-22

## 2025-04-21 RX ORDER — SODIUM CHLORIDE 0.9 % (FLUSH) 0.9 %
5-40 SYRINGE (ML) INJECTION PRN
OUTPATIENT
Start: 2025-04-22

## 2025-04-21 RX ORDER — SODIUM CHLORIDE 9 MG/ML
5-250 INJECTION, SOLUTION INTRAVENOUS PRN
OUTPATIENT
Start: 2025-04-22

## 2025-04-21 RX ORDER — ACETAMINOPHEN 325 MG/1
650 TABLET ORAL
Status: DISCONTINUED | OUTPATIENT
Start: 2025-04-21 | End: 2025-04-21 | Stop reason: HOSPADM

## 2025-04-21 RX ORDER — ONDANSETRON 2 MG/ML
8 INJECTION INTRAMUSCULAR; INTRAVENOUS
Status: DISCONTINUED | OUTPATIENT
Start: 2025-04-21 | End: 2025-04-21 | Stop reason: HOSPADM

## 2025-04-21 RX ORDER — EPINEPHRINE 1 MG/ML
0.3 INJECTION, SOLUTION, CONCENTRATE INTRAVENOUS PRN
OUTPATIENT
Start: 2025-04-22

## 2025-04-21 RX ORDER — LEFLUNOMIDE 20 MG/1
TABLET ORAL
Qty: 90 TABLET | Refills: 1 | Status: SHIPPED | OUTPATIENT
Start: 2025-04-21

## 2025-04-21 RX ORDER — HYDROCORTISONE SODIUM SUCCINATE 100 MG/2ML
100 INJECTION INTRAMUSCULAR; INTRAVENOUS
Status: DISCONTINUED | OUTPATIENT
Start: 2025-04-21 | End: 2025-04-21 | Stop reason: HOSPADM

## 2025-04-21 RX ORDER — HYDROCORTISONE SODIUM SUCCINATE 100 MG/2ML
100 INJECTION INTRAMUSCULAR; INTRAVENOUS
OUTPATIENT
Start: 2025-04-22

## 2025-04-21 RX ORDER — FAMOTIDINE 10 MG/ML
20 INJECTION, SOLUTION INTRAVENOUS
OUTPATIENT
Start: 2025-04-22

## 2025-04-21 RX ORDER — SODIUM CHLORIDE 9 MG/ML
INJECTION, SOLUTION INTRAVENOUS CONTINUOUS
OUTPATIENT
Start: 2025-04-22

## 2025-04-21 RX ORDER — ACETAMINOPHEN 325 MG/1
650 TABLET ORAL
OUTPATIENT
Start: 2025-04-22

## 2025-04-21 RX ORDER — DIPHENHYDRAMINE HYDROCHLORIDE 50 MG/ML
50 INJECTION, SOLUTION INTRAMUSCULAR; INTRAVENOUS
OUTPATIENT
Start: 2025-04-22

## 2025-04-21 RX ORDER — DIPHENHYDRAMINE HCL 25 MG
25 TABLET ORAL ONCE
OUTPATIENT
Start: 2025-04-22 | End: 2025-04-22

## 2025-04-21 RX ORDER — DIPHENHYDRAMINE HCL 25 MG
25 TABLET ORAL ONCE
Status: DISCONTINUED | OUTPATIENT
Start: 2025-04-21 | End: 2025-04-21 | Stop reason: HOSPADM

## 2025-04-21 RX ORDER — EPINEPHRINE 1 MG/ML
0.3 INJECTION, SOLUTION, CONCENTRATE INTRAVENOUS PRN
Status: DISCONTINUED | OUTPATIENT
Start: 2025-04-21 | End: 2025-04-21 | Stop reason: HOSPADM

## 2025-04-21 RX ORDER — FAMOTIDINE 10 MG/ML
20 INJECTION, SOLUTION INTRAVENOUS
Status: DISCONTINUED | OUTPATIENT
Start: 2025-04-21 | End: 2025-04-21 | Stop reason: HOSPADM

## 2025-04-21 RX ORDER — ALBUTEROL SULFATE 90 UG/1
4 INHALANT RESPIRATORY (INHALATION) PRN
OUTPATIENT
Start: 2025-04-22

## 2025-04-21 RX ORDER — SODIUM CHLORIDE 9 MG/ML
INJECTION, SOLUTION INTRAVENOUS CONTINUOUS
Status: DISCONTINUED | OUTPATIENT
Start: 2025-04-21 | End: 2025-04-21 | Stop reason: HOSPADM

## 2025-04-21 RX ORDER — ACETAMINOPHEN 325 MG/1
650 TABLET ORAL ONCE
Status: DISCONTINUED | OUTPATIENT
Start: 2025-04-21 | End: 2025-04-21 | Stop reason: HOSPADM

## 2025-04-21 RX ORDER — ALBUTEROL SULFATE 90 UG/1
4 INHALANT RESPIRATORY (INHALATION) PRN
Status: DISCONTINUED | OUTPATIENT
Start: 2025-04-21 | End: 2025-04-21 | Stop reason: HOSPADM

## 2025-04-21 RX ORDER — ONDANSETRON 2 MG/ML
8 INJECTION INTRAMUSCULAR; INTRAVENOUS
OUTPATIENT
Start: 2025-04-22

## 2025-04-21 RX ORDER — DIPHENHYDRAMINE HYDROCHLORIDE 50 MG/ML
50 INJECTION, SOLUTION INTRAMUSCULAR; INTRAVENOUS
Status: DISCONTINUED | OUTPATIENT
Start: 2025-04-21 | End: 2025-04-21 | Stop reason: HOSPADM

## 2025-04-21 RX ADMIN — INFLIXIMAB 300 MG: 100 INJECTION, POWDER, LYOPHILIZED, FOR SOLUTION INTRAVENOUS at 11:20

## 2025-04-21 ASSESSMENT — JOINT PAIN
TOTAL NUMBER OF TENDER JOINTS: 22
TOTAL NUMBER OF SWOLLEN JOINTS: 4

## 2025-04-21 ASSESSMENT — ROUTINE ASSESSMENT OF PATIENT INDEX DATA (RAPID3)
WHEN YOU AWAKENED IN THE MORNING OVER THE LAST WEEK, PLEASE INDICATE THE AMOUNT OF TIME IT TAKES UNTIL YOU ARE AS LIMBER AS YOU WILL BE FOR THE DAY: 1 HOUR
ON A SCALE OF ONE TO TEN, HOW DIFFICULT WAS IT FOR YOU TO COMPLETE THE LISTED DAILY PHYSICAL TASKS OVER THE LAST WEEK: 1.3
ON A SCALE OF ONE TO TEN, CONSIDERING ALL THE WAYS IN WHICH ILLNESS AND HEALTH CONDITIONS MAY AFFECT YOU AT THIS TIME, PLEASE INDICATE BELOW HOW YOU ARE DOING:: 7
ON A SCALE OF ONE TO TEN, HOW MUCH PAIN HAVE YOU HAD BECAUSE OF YOUR CONDITION OVER THE PAST WEEK?: 7
ON A SCALE OF ONE TO TEN, HOW MUCH OF A PROBLEM HAS UNUSUAL FATIGUE OR TIREDNESS BEEN FOR YOU OVER THE PAST WEEK?: 7

## 2025-04-21 NOTE — PROGRESS NOTES
[facility]  Chery Uriarte M.D.  [address] , [address]   [facility]  Office : [phone], Fax: [phone]     RHEUMATOLOGY OFFICE VISIT NOTE  Date of Visit:  2025 10:34 AM    Patient Information:  Name:  Thee Sue  :  1960  Age:  64 y.o.   Gender:  male      Mr. Sue is here today for follow-up of RA and medication monitoring.  Here for his next IV Remicade infusion.    Last visit: 25    History of Present Illness: On talking to the patient today he states that he has had a slight cough with brown productive sputum with no congestion, fever or chills.  On talking to him further he states that he has had some shortness of breath with no chest pain.  Since starting the IV Remicade he does feel a little better but continues to have ongoing joint pain as mentioned below.    Since the last visit, patient is feeling \"fair\".    Pain: 7/10  Location:  Some pain and swelling worse in the MCP than the PIP joints with no warmth and redness. No thumb pain. Bilateral wrist pain and swelling with no warmth and redness. Bilateral shoulder pain. Some para spinal muscle pain. No pain with neck ROM. Occasional tension headaches. Bilateral groin pain with no lateral hip pain. Bilateral knee pain with swelling with no buckling of the knees. Bilateral ankle swelling with pain but no buckling with no warmth and redness.   Quality:  Throbbing to achy pain.   Modifying Factors:  1st thing in the morning the stiffness is the worst and movement eases the stiffness.   Associated Symptoms:  Has a weak  but not any worse with difficulty opening jars with some difficulty buttoning and unbuttoning. No tingling,  numbness or pain down the legs but has had intermittent tingling and numbness of his feet.        2025    10:00 AM   DMARD/Biologic   AM Stiffness 1 hour   Pain 7   Fatigue 7   MDHAQ 1.3   Patient Global Score 7   Medication Name Arava

## 2025-04-21 NOTE — PROGRESS NOTES
[facility]  [address] [place] [address], [address]  [place] [address]  Office : [phone], Fax: [phone]       Remicade infusion 4/21/2025 300mg =4.2mg/kg infused in 0.9% NaCl. 0 mg of Remicade was wasted. Patient tolerated the infusion without complications. Infusion placed in LFA vein.  IV inserted by Thu Evans RN  Next Infusion in 8 weeks- 6/16/25    IV insertion time: 1108  Medication start time: 1120  Medication completion time: 1340    Patient Medication Supplied? yes, Rand    Patient discharged feeling well and instructed to call the office with any post-infusion issues.    Pre-Infusion Questionnaire  Has your insurance changed or have you received a new card since your last visit?  no  Have you had any infections since your last infusion?   no  Since your last visit, have you been hospitalized, been to the ER, or Urgent Care Center for any reason?  no  Have you recently had GI problems, open wounds, urinary problems, or chest pain?   no  Are you currently taking antibiotics?   no  Have you recently had or are you scheduled for any surgical procedures?   no  Have you had a TB test in the last year?   yes, 11/18/24  Did you premedicate for today's infusion?  no  Have you received any vaccinations in the last 6 months, or planning to receive in the next 3 months: COVID, Flu, Pheumonia, Shingles?  yes, flu vaccine  When was your last appointment with Dr. Uriarte, Dr. Leach, or Dr. Rebolledo?  4/21/25  When was your last infusion?        2/24/25  12. Are you in skilled nursing facility, Rehab, or living at a nursing home?      no    Reviewed and Confirmed by Thee Sue

## 2025-04-22 ENCOUNTER — TELEPHONE (OUTPATIENT)
Dept: PULMONOLOGY | Age: 65
End: 2025-04-22

## 2025-04-22 NOTE — TELEPHONE ENCOUNTER
Called patient and left VM to see if he was ready to schedule his next appointment with Dr. Graham and to call the office to schedule. Sending letter and [employer] message      Return for follow up 18 months with Santos.   (5/20/2025)

## 2025-05-02 ENCOUNTER — TELEPHONE (OUTPATIENT)
Dept: RHEUMATOLOGY | Age: 65
End: 2025-05-02

## 2025-05-02 DIAGNOSIS — M06.09 RHEUMATOID ARTHRITIS, SERONEGATIVE, MULTIPLE SITES (HCC): Primary | ICD-10-CM

## 2025-05-02 RX ORDER — INFLIXIMAB 100 MG/10ML
INJECTION, POWDER, LYOPHILIZED, FOR SOLUTION INTRAVENOUS
Qty: 4 EACH | Refills: 5 | Status: ACTIVE | OUTPATIENT
Start: 2025-05-02

## 2025-05-02 NOTE — TELEPHONE ENCOUNTER
Patient's dose of Remicade to be increased from 300 mg to 400 mg (5 mg/kg) at his next infusion 6/16/25. Patient enrolled in [employer] [facility]. [facility].   Rx entered and pended for review and sig.   Next infusion 6/16/25

## 2025-06-16 ENCOUNTER — CLINICAL SUPPORT (OUTPATIENT)
Dept: RHEUMATOLOGY | Age: 65
End: 2025-06-16
Payer: MEDICARE

## 2025-06-16 VITALS
BODY MASS INDEX: 23.87 KG/M2 | HEART RATE: 61 BPM | TEMPERATURE: 97.5 F | DIASTOLIC BLOOD PRESSURE: 82 MMHG | WEIGHT: 152.4 LBS | SYSTOLIC BLOOD PRESSURE: 126 MMHG

## 2025-06-16 DIAGNOSIS — M06.09 RHEUMATOID ARTHRITIS, SERONEGATIVE, MULTIPLE SITES (HCC): Primary | ICD-10-CM

## 2025-06-16 DIAGNOSIS — Z79.899 LONG-TERM USE OF HIGH-RISK MEDICATION: ICD-10-CM

## 2025-06-16 DIAGNOSIS — M06.09 RHEUMATOID ARTHRITIS, SERONEGATIVE, MULTIPLE SITES (HCC): ICD-10-CM

## 2025-06-16 LAB
ALBUMIN SERPL-MCNC: 3.1 G/DL (ref 3.2–4.6)
ALBUMIN/GLOB SERPL: 0.9 (ref 1–1.9)
ALP SERPL-CCNC: 112 U/L (ref 40–129)
ALT SERPL-CCNC: 13 U/L (ref 8–55)
ANION GAP SERPL CALC-SCNC: 12 MMOL/L (ref 7–16)
AST SERPL-CCNC: 19 U/L (ref 15–37)
BASOPHILS # BLD: 0.09 K/UL (ref 0–0.2)
BASOPHILS NFR BLD: 1.4 % (ref 0–2)
BILIRUB SERPL-MCNC: <0.2 MG/DL (ref 0–1.2)
BUN SERPL-MCNC: 7 MG/DL (ref 8–23)
CALCIUM SERPL-MCNC: 9.5 MG/DL (ref 8.8–10.2)
CHLORIDE SERPL-SCNC: 105 MMOL/L (ref 98–107)
CO2 SERPL-SCNC: 23 MMOL/L (ref 20–29)
CREAT SERPL-MCNC: 0.97 MG/DL (ref 0.8–1.3)
CRP SERPL-MCNC: 0.3 MG/DL (ref 0–0.4)
DIFFERENTIAL METHOD BLD: ABNORMAL
EOSINOPHIL # BLD: 0 K/UL (ref 0–0.8)
EOSINOPHIL NFR BLD: 0 % (ref 0.5–7.8)
ERYTHROCYTE [DISTWIDTH] IN BLOOD BY AUTOMATED COUNT: 14.6 % (ref 11.9–14.6)
GLOBULIN SER CALC-MCNC: 3.5 G/DL (ref 2.3–3.5)
GLUCOSE SERPL-MCNC: 105 MG/DL (ref 70–99)
HCT VFR BLD AUTO: 41.5 % (ref 41.1–50.3)
HGB BLD-MCNC: 13.2 G/DL (ref 13.6–17.2)
IMM GRANULOCYTES # BLD AUTO: 0.18 K/UL (ref 0–0.5)
IMM GRANULOCYTES NFR BLD AUTO: 2.7 % (ref 0–5)
LYMPHOCYTES # BLD: 3.07 K/UL (ref 0.5–4.6)
LYMPHOCYTES NFR BLD: 46.2 % (ref 13–44)
MCH RBC QN AUTO: 28.9 PG (ref 26.1–32.9)
MCHC RBC AUTO-ENTMCNC: 31.8 G/DL (ref 31.4–35)
MCV RBC AUTO: 90.8 FL (ref 82–102)
MONOCYTES # BLD: 0.56 K/UL (ref 0.1–1.3)
MONOCYTES NFR BLD: 8.4 % (ref 4–12)
NEUTS SEG # BLD: 2.75 K/UL (ref 1.7–8.2)
NEUTS SEG NFR BLD: 41.3 % (ref 43–78)
NRBC # BLD: 0 K/UL (ref 0–0.2)
PLATELET # BLD AUTO: 300 K/UL (ref 150–450)
PMV BLD AUTO: 8.9 FL (ref 9.4–12.3)
POTASSIUM SERPL-SCNC: 4.2 MMOL/L (ref 3.5–5.1)
PROT SERPL-MCNC: 6.6 G/DL (ref 6.3–8.2)
RBC # BLD AUTO: 4.57 M/UL (ref 4.23–5.6)
SODIUM SERPL-SCNC: 140 MMOL/L (ref 136–145)
WBC # BLD AUTO: 6.7 K/UL (ref 4.3–11.1)

## 2025-06-16 PROCEDURE — 96413 CHEMO IV INFUSION 1 HR: CPT | Performed by: INTERNAL MEDICINE

## 2025-06-16 RX ORDER — ACETAMINOPHEN 325 MG/1
650 TABLET ORAL ONCE
OUTPATIENT
Start: 2025-08-11 | End: 2025-08-11

## 2025-06-16 RX ORDER — EPINEPHRINE 1 MG/ML
0.3 INJECTION, SOLUTION, CONCENTRATE INTRAVENOUS PRN
Status: DISCONTINUED | OUTPATIENT
Start: 2025-06-16 | End: 2025-06-16 | Stop reason: HOSPADM

## 2025-06-16 RX ORDER — HEPARIN SODIUM (PORCINE) LOCK FLUSH IV SOLN 100 UNIT/ML 100 UNIT/ML
500 SOLUTION INTRAVENOUS PRN
OUTPATIENT
Start: 2025-08-11

## 2025-06-16 RX ORDER — INFLIXIMAB 100 MG/10ML
5 INJECTION, POWDER, LYOPHILIZED, FOR SOLUTION INTRAVENOUS ONCE
Status: COMPLETED | OUTPATIENT
Start: 2025-06-16 | End: 2025-06-16

## 2025-06-16 RX ORDER — SODIUM CHLORIDE 0.9 % (FLUSH) 0.9 %
5-40 SYRINGE (ML) INJECTION PRN
OUTPATIENT
Start: 2025-08-11

## 2025-06-16 RX ORDER — SODIUM CHLORIDE 9 MG/ML
5-250 INJECTION, SOLUTION INTRAVENOUS PRN
OUTPATIENT
Start: 2025-08-11

## 2025-06-16 RX ORDER — DIPHENHYDRAMINE HYDROCHLORIDE 50 MG/ML
50 INJECTION, SOLUTION INTRAMUSCULAR; INTRAVENOUS
OUTPATIENT
Start: 2025-08-11

## 2025-06-16 RX ORDER — ACETAMINOPHEN 325 MG/1
650 TABLET ORAL
OUTPATIENT
Start: 2025-08-11

## 2025-06-16 RX ORDER — DIPHENHYDRAMINE HCL 25 MG
25 TABLET ORAL ONCE
Status: DISCONTINUED | OUTPATIENT
Start: 2025-06-16 | End: 2025-06-16 | Stop reason: HOSPADM

## 2025-06-16 RX ORDER — ONDANSETRON 2 MG/ML
8 INJECTION INTRAMUSCULAR; INTRAVENOUS
OUTPATIENT
Start: 2025-08-11

## 2025-06-16 RX ORDER — DIPHENHYDRAMINE HYDROCHLORIDE 50 MG/ML
50 INJECTION, SOLUTION INTRAMUSCULAR; INTRAVENOUS
Status: DISCONTINUED | OUTPATIENT
Start: 2025-06-16 | End: 2025-06-16 | Stop reason: HOSPADM

## 2025-06-16 RX ORDER — HYDROCORTISONE SODIUM SUCCINATE 100 MG/2ML
100 INJECTION INTRAMUSCULAR; INTRAVENOUS
Status: DISCONTINUED | OUTPATIENT
Start: 2025-06-16 | End: 2025-06-16 | Stop reason: HOSPADM

## 2025-06-16 RX ORDER — HYDROCORTISONE SODIUM SUCCINATE 100 MG/2ML
100 INJECTION INTRAMUSCULAR; INTRAVENOUS
OUTPATIENT
Start: 2025-08-11

## 2025-06-16 RX ORDER — FAMOTIDINE 10 MG/ML
20 INJECTION, SOLUTION INTRAVENOUS
Status: DISCONTINUED | OUTPATIENT
Start: 2025-06-16 | End: 2025-06-16 | Stop reason: HOSPADM

## 2025-06-16 RX ORDER — SODIUM CHLORIDE 9 MG/ML
INJECTION, SOLUTION INTRAVENOUS CONTINUOUS
OUTPATIENT
Start: 2025-08-11

## 2025-06-16 RX ORDER — ALBUTEROL SULFATE 90 UG/1
4 INHALANT RESPIRATORY (INHALATION) PRN
Status: DISCONTINUED | OUTPATIENT
Start: 2025-06-16 | End: 2025-06-16 | Stop reason: HOSPADM

## 2025-06-16 RX ORDER — FAMOTIDINE 10 MG/ML
20 INJECTION, SOLUTION INTRAVENOUS
OUTPATIENT
Start: 2025-08-11

## 2025-06-16 RX ORDER — SODIUM CHLORIDE 9 MG/ML
INJECTION, SOLUTION INTRAVENOUS CONTINUOUS
Status: DISCONTINUED | OUTPATIENT
Start: 2025-06-16 | End: 2025-06-16 | Stop reason: HOSPADM

## 2025-06-16 RX ORDER — ALBUTEROL SULFATE 90 UG/1
4 INHALANT RESPIRATORY (INHALATION) PRN
OUTPATIENT
Start: 2025-08-11

## 2025-06-16 RX ORDER — ACETAMINOPHEN 325 MG/1
650 TABLET ORAL
Status: DISCONTINUED | OUTPATIENT
Start: 2025-06-16 | End: 2025-06-16 | Stop reason: HOSPADM

## 2025-06-16 RX ORDER — EPINEPHRINE 1 MG/ML
0.3 INJECTION, SOLUTION, CONCENTRATE INTRAVENOUS PRN
OUTPATIENT
Start: 2025-08-11

## 2025-06-16 RX ORDER — ACETAMINOPHEN 325 MG/1
650 TABLET ORAL ONCE
Status: DISCONTINUED | OUTPATIENT
Start: 2025-06-16 | End: 2025-06-16 | Stop reason: HOSPADM

## 2025-06-16 RX ORDER — DIPHENHYDRAMINE HCL 25 MG
25 TABLET ORAL ONCE
OUTPATIENT
Start: 2025-08-11 | End: 2025-08-11

## 2025-06-16 RX ORDER — ONDANSETRON 2 MG/ML
8 INJECTION INTRAMUSCULAR; INTRAVENOUS
Status: DISCONTINUED | OUTPATIENT
Start: 2025-06-16 | End: 2025-06-16 | Stop reason: HOSPADM

## 2025-06-16 RX ADMIN — INFLIXIMAB 400 MG: 100 INJECTION, POWDER, LYOPHILIZED, FOR SOLUTION INTRAVENOUS at 10:10

## 2025-06-16 NOTE — PROGRESS NOTES
[facility]  [address] [place] [address], [address]  [place] [address]  Office : [phone], Fax: [phone]       Remicade infusion 6/16/2025 400 mg = 5.8 mg/kg infused in 0.9% NaCl. 0 mg of Remicade was wasted. Patient tolerated the infusion without complications. Infusion placed in LFA vein.  IV inserted by Fabienne Tran RN  Next Infusion in 8 weeks on 8/11/2025    IV insertion time: 0950  Medication start time: 1010  Medication completion time: 1110    Patient Medication Supplied? yes, pt medication supplied by [employer]    Patient discharged feeling well and instructed to call the office with any post-infusion issues.    Pre-Infusion Questionnaire  Has your insurance changed or have you received a new card since your last visit?  no  Have you had any infections since your last infusion?   no  Since your last visit, have you been hospitalized, been to the ER, or Urgent Care Center for any reason?  no  Have you recently had GI problems, open wounds, urinary problems, or chest pain?   no  Are you currently taking antibiotics?   no  Have you recently had or are you scheduled for any surgical procedures?   no  Have you had a TB test in the last year?   yes, 11/18/2024 (negative)  Did you premedicate for today's infusion?  no  Have you received any vaccinations in the last 6 months, or planning to receive in the next 3 months: COVID, Flu, Pheumonia, Shingles?  no  When was your last appointment with Dr. Uriarte, Dr. Leach, or Dr. Rebolledo?  4/21/2025  When was your last infusion?        4/21/2025  12. Are you in skilled nursing facility, Rehab, or living at a nursing home?      no    Reviewed and Confirmed by Thee Sue

## 2025-08-10 RX ORDER — INFLIXIMAB 100 MG/10ML
5 INJECTION, POWDER, LYOPHILIZED, FOR SOLUTION INTRAVENOUS ONCE
Start: 2025-08-10 | End: 2025-08-10

## 2025-08-11 ENCOUNTER — OFFICE VISIT (OUTPATIENT)
Dept: RHEUMATOLOGY | Age: 65
End: 2025-08-11
Payer: MEDICARE

## 2025-08-11 ENCOUNTER — CLINICAL SUPPORT (OUTPATIENT)
Dept: RHEUMATOLOGY | Age: 65
End: 2025-08-11

## 2025-08-11 VITALS
WEIGHT: 150.6 LBS | BODY MASS INDEX: 23.64 KG/M2 | OXYGEN SATURATION: 96 % | HEIGHT: 67 IN | HEART RATE: 79 BPM | DIASTOLIC BLOOD PRESSURE: 90 MMHG | SYSTOLIC BLOOD PRESSURE: 134 MMHG

## 2025-08-11 VITALS
TEMPERATURE: 97.8 F | HEART RATE: 79 BPM | SYSTOLIC BLOOD PRESSURE: 134 MMHG | WEIGHT: 150.57 LBS | DIASTOLIC BLOOD PRESSURE: 90 MMHG | BODY MASS INDEX: 23.58 KG/M2

## 2025-08-11 DIAGNOSIS — Z79.899 LONG-TERM USE OF HIGH-RISK MEDICATION: ICD-10-CM

## 2025-08-11 DIAGNOSIS — M06.09 RHEUMATOID ARTHRITIS, SERONEGATIVE, MULTIPLE SITES (HCC): Primary | ICD-10-CM

## 2025-08-11 PROCEDURE — G8427 DOCREV CUR MEDS BY ELIG CLIN: HCPCS | Performed by: INTERNAL MEDICINE

## 2025-08-11 PROCEDURE — 99214 OFFICE O/P EST MOD 30 MIN: CPT | Performed by: INTERNAL MEDICINE

## 2025-08-11 PROCEDURE — G8420 CALC BMI NORM PARAMETERS: HCPCS | Performed by: INTERNAL MEDICINE

## 2025-08-11 PROCEDURE — 1036F TOBACCO NON-USER: CPT | Performed by: INTERNAL MEDICINE

## 2025-08-11 PROCEDURE — 3017F COLORECTAL CA SCREEN DOC REV: CPT | Performed by: INTERNAL MEDICINE

## 2025-08-11 PROCEDURE — 1123F ACP DISCUSS/DSCN MKR DOCD: CPT | Performed by: INTERNAL MEDICINE

## 2025-08-11 RX ORDER — INFLIXIMAB 100 MG/10ML
5 INJECTION, POWDER, LYOPHILIZED, FOR SOLUTION INTRAVENOUS ONCE
Status: COMPLETED | OUTPATIENT
Start: 2025-08-11 | End: 2025-08-11

## 2025-08-11 RX ORDER — FAMOTIDINE 10 MG/ML
20 INJECTION, SOLUTION INTRAVENOUS
Status: DISCONTINUED | OUTPATIENT
Start: 2025-08-11 | End: 2025-08-11 | Stop reason: HOSPADM

## 2025-08-11 RX ORDER — HYDROCORTISONE SODIUM SUCCINATE 100 MG/2ML
100 INJECTION INTRAMUSCULAR; INTRAVENOUS
Status: DISCONTINUED | OUTPATIENT
Start: 2025-08-11 | End: 2025-08-11 | Stop reason: HOSPADM

## 2025-08-11 RX ORDER — DIPHENHYDRAMINE HCL 25 MG
25 TABLET ORAL ONCE
Status: DISCONTINUED | OUTPATIENT
Start: 2025-08-11 | End: 2025-08-11 | Stop reason: HOSPADM

## 2025-08-11 RX ORDER — DIPHENHYDRAMINE HYDROCHLORIDE 50 MG/ML
50 INJECTION, SOLUTION INTRAMUSCULAR; INTRAVENOUS
Status: DISCONTINUED | OUTPATIENT
Start: 2025-08-11 | End: 2025-08-11 | Stop reason: HOSPADM

## 2025-08-11 RX ORDER — ONDANSETRON 2 MG/ML
8 INJECTION INTRAMUSCULAR; INTRAVENOUS
OUTPATIENT
Start: 2025-10-06

## 2025-08-11 RX ORDER — DIPHENHYDRAMINE HCL 25 MG
25 TABLET ORAL ONCE
OUTPATIENT
Start: 2025-10-06 | End: 2025-10-06

## 2025-08-11 RX ORDER — SODIUM CHLORIDE 9 MG/ML
INJECTION, SOLUTION INTRAVENOUS CONTINUOUS
Status: DISCONTINUED | OUTPATIENT
Start: 2025-08-11 | End: 2025-08-11 | Stop reason: HOSPADM

## 2025-08-11 RX ORDER — DIPHENHYDRAMINE HYDROCHLORIDE 50 MG/ML
50 INJECTION, SOLUTION INTRAMUSCULAR; INTRAVENOUS
OUTPATIENT
Start: 2025-10-06

## 2025-08-11 RX ORDER — LEFLUNOMIDE 20 MG/1
TABLET ORAL
Qty: 90 TABLET | Refills: 1 | Status: SHIPPED | OUTPATIENT
Start: 2025-08-11

## 2025-08-11 RX ORDER — ACETAMINOPHEN 325 MG/1
650 TABLET ORAL
Status: DISCONTINUED | OUTPATIENT
Start: 2025-08-11 | End: 2025-08-11 | Stop reason: HOSPADM

## 2025-08-11 RX ORDER — ALBUTEROL SULFATE 90 UG/1
4 INHALANT RESPIRATORY (INHALATION) PRN
OUTPATIENT
Start: 2025-10-06

## 2025-08-11 RX ORDER — EPINEPHRINE 1 MG/ML
0.3 INJECTION, SOLUTION, CONCENTRATE INTRAVENOUS PRN
OUTPATIENT
Start: 2025-10-06

## 2025-08-11 RX ORDER — ACETAMINOPHEN 325 MG/1
650 TABLET ORAL
OUTPATIENT
Start: 2025-10-06

## 2025-08-11 RX ORDER — ALBUTEROL SULFATE 90 UG/1
4 INHALANT RESPIRATORY (INHALATION) PRN
Status: DISCONTINUED | OUTPATIENT
Start: 2025-08-11 | End: 2025-08-11 | Stop reason: HOSPADM

## 2025-08-11 RX ORDER — ACETAMINOPHEN 325 MG/1
650 TABLET ORAL ONCE
OUTPATIENT
Start: 2025-10-06 | End: 2025-10-06

## 2025-08-11 RX ORDER — FAMOTIDINE 10 MG/ML
20 INJECTION, SOLUTION INTRAVENOUS
OUTPATIENT
Start: 2025-10-06

## 2025-08-11 RX ORDER — ONDANSETRON 2 MG/ML
8 INJECTION INTRAMUSCULAR; INTRAVENOUS
Status: DISCONTINUED | OUTPATIENT
Start: 2025-08-11 | End: 2025-08-11 | Stop reason: HOSPADM

## 2025-08-11 RX ORDER — EPINEPHRINE 1 MG/ML
0.3 INJECTION, SOLUTION, CONCENTRATE INTRAVENOUS PRN
Status: DISCONTINUED | OUTPATIENT
Start: 2025-08-11 | End: 2025-08-11 | Stop reason: HOSPADM

## 2025-08-11 RX ORDER — INFLIXIMAB 100 MG/10ML
5 INJECTION, POWDER, LYOPHILIZED, FOR SOLUTION INTRAVENOUS ONCE
Start: 2025-10-06 | End: 2025-10-06

## 2025-08-11 RX ORDER — SODIUM CHLORIDE 9 MG/ML
INJECTION, SOLUTION INTRAVENOUS CONTINUOUS
OUTPATIENT
Start: 2025-10-06

## 2025-08-11 RX ORDER — SODIUM CHLORIDE 9 MG/ML
5-250 INJECTION, SOLUTION INTRAVENOUS PRN
OUTPATIENT
Start: 2025-10-06

## 2025-08-11 RX ORDER — ACETAMINOPHEN 325 MG/1
650 TABLET ORAL ONCE
Status: DISCONTINUED | OUTPATIENT
Start: 2025-08-11 | End: 2025-08-11 | Stop reason: HOSPADM

## 2025-08-11 RX ORDER — HEPARIN SODIUM (PORCINE) LOCK FLUSH IV SOLN 100 UNIT/ML 100 UNIT/ML
500 SOLUTION INTRAVENOUS PRN
OUTPATIENT
Start: 2025-10-06

## 2025-08-11 RX ORDER — SODIUM CHLORIDE 0.9 % (FLUSH) 0.9 %
5-40 SYRINGE (ML) INJECTION PRN
OUTPATIENT
Start: 2025-10-06

## 2025-08-11 RX ORDER — HYDROCORTISONE SODIUM SUCCINATE 100 MG/2ML
100 INJECTION INTRAMUSCULAR; INTRAVENOUS
OUTPATIENT
Start: 2025-10-06

## 2025-08-11 RX ADMIN — INFLIXIMAB 400 MG: 100 INJECTION, POWDER, LYOPHILIZED, FOR SOLUTION INTRAVENOUS at 10:40

## 2025-08-11 ASSESSMENT — ROUTINE ASSESSMENT OF PATIENT INDEX DATA (RAPID3)
WHEN YOU AWAKENED IN THE MORNING OVER THE LAST WEEK, PLEASE INDICATE THE AMOUNT OF TIME IT TAKES UNTIL YOU ARE AS LIMBER AS YOU WILL BE FOR THE DAY: 1 HOUR
ON A SCALE OF ONE TO TEN, CONSIDERING ALL THE WAYS IN WHICH ILLNESS AND HEALTH CONDITIONS MAY AFFECT YOU AT THIS TIME, PLEASE INDICATE BELOW HOW YOU ARE DOING:: 7
ON A SCALE OF ONE TO TEN, HOW MUCH PAIN HAVE YOU HAD BECAUSE OF YOUR CONDITION OVER THE PAST WEEK?: 7
ON A SCALE OF ONE TO TEN, HOW MUCH OF A PROBLEM HAS UNUSUAL FATIGUE OR TIREDNESS BEEN FOR YOU OVER THE PAST WEEK?: 7
ON A SCALE OF ONE TO TEN, HOW DIFFICULT WAS IT FOR YOU TO COMPLETE THE LISTED DAILY PHYSICAL TASKS OVER THE LAST WEEK: 1.0

## 2025-08-11 ASSESSMENT — JOINT PAIN
TOTAL NUMBER OF SWOLLEN JOINTS: 2
TOTAL NUMBER OF TENDER JOINTS: 14

## (undated) DEVICE — KENDALL RADIOLUCENT FOAM MONITORING ELECTRODE RECTANGULAR SHAPE: Brand: KENDALL

## (undated) DEVICE — BLOCK BITE AD 60FR W/ VELC STRP ADDRESSES MOST PT AND

## (undated) DEVICE — ESOPHAGEAL BALLOON DILATATION CATHETER: Brand: CRE FIXED WIRE

## (undated) DEVICE — CANNULA NSL ORAL AD FOR CAPNOFLEX CO2 O2 AIRLFE

## (undated) DEVICE — CONNECTOR TBNG OD5-7MM O2 END DISP

## (undated) DEVICE — BRONCHOSCOPY PACK: Brand: MEDLINE INDUSTRIES, INC.

## (undated) DEVICE — SYR 50ML SLIP TIP NSAF LF STRL --

## (undated) DEVICE — FCPS BIOP PULM RAD JAW 100CML -- BX/10 M00515180

## (undated) DEVICE — SYR 5ML 1/5 GRAD LL NSAF LF --

## (undated) DEVICE — NDL PRT INJ NSAF BLNT 18GX1.5 --

## (undated) DEVICE — SYR 3ML LL TIP 1/10ML GRAD --

## (undated) DEVICE — SINGLE USE BIOPSY VALVE MAJ-210: Brand: SINGLE USE BIOPSY VALVE (STERILE)

## (undated) DEVICE — SINGLE USE SUCTION VALVE MAJ-209: Brand: SINGLE USE SUCTION VALVE (STERILE)